# Patient Record
Sex: MALE | Race: BLACK OR AFRICAN AMERICAN | NOT HISPANIC OR LATINO | URBAN - METROPOLITAN AREA
[De-identification: names, ages, dates, MRNs, and addresses within clinical notes are randomized per-mention and may not be internally consistent; named-entity substitution may affect disease eponyms.]

---

## 2017-11-27 ENCOUNTER — INPATIENT (INPATIENT)
Facility: HOSPITAL | Age: 67
LOS: 14 days | Discharge: HOME | DRG: 264 | End: 2017-12-12
Attending: HOSPITALIST | Admitting: HOSPITALIST
Payer: COMMERCIAL

## 2017-11-27 VITALS
HEART RATE: 72 BPM | WEIGHT: 229.94 LBS | HEIGHT: 73 IN | DIASTOLIC BLOOD PRESSURE: 80 MMHG | SYSTOLIC BLOOD PRESSURE: 109 MMHG | OXYGEN SATURATION: 96 % | TEMPERATURE: 98 F | RESPIRATION RATE: 20 BRPM

## 2017-11-27 DIAGNOSIS — R55 SYNCOPE AND COLLAPSE: ICD-10-CM

## 2017-11-27 DIAGNOSIS — R73.02 IMPAIRED GLUCOSE TOLERANCE (ORAL): ICD-10-CM

## 2017-11-27 DIAGNOSIS — D64.9 ANEMIA, UNSPECIFIED: ICD-10-CM

## 2017-11-27 DIAGNOSIS — N18.2 CHRONIC KIDNEY DISEASE, STAGE 2 (MILD): ICD-10-CM

## 2017-11-27 DIAGNOSIS — L97.313 NON-PRESSURE CHRONIC ULCER OF RIGHT ANKLE WITH NECROSIS OF MUSCLE: ICD-10-CM

## 2017-11-27 DIAGNOSIS — I10 ESSENTIAL (PRIMARY) HYPERTENSION: ICD-10-CM

## 2017-11-27 DIAGNOSIS — I83.012 VARICOSE VEINS OF RIGHT LOWER EXTREMITY WITH ULCER OF CALF: ICD-10-CM

## 2017-11-27 LAB
ALBUMIN SERPL ELPH-MCNC: 2.8 G/DL — LOW (ref 3.3–5.2)
ALP SERPL-CCNC: 66 U/L — SIGNIFICANT CHANGE UP (ref 40–120)
ALT FLD-CCNC: 7 U/L — SIGNIFICANT CHANGE UP
ANION GAP SERPL CALC-SCNC: 13 MMOL/L — SIGNIFICANT CHANGE UP (ref 5–17)
AST SERPL-CCNC: 11 U/L — SIGNIFICANT CHANGE UP
BASOPHILS # BLD AUTO: 0 K/UL — SIGNIFICANT CHANGE UP (ref 0–0.2)
BASOPHILS NFR BLD AUTO: 0.2 % — SIGNIFICANT CHANGE UP (ref 0–2)
BILIRUB SERPL-MCNC: 0.3 MG/DL — LOW (ref 0.4–2)
BUN SERPL-MCNC: 15 MG/DL — SIGNIFICANT CHANGE UP (ref 8–20)
CALCIUM SERPL-MCNC: 8.7 MG/DL — SIGNIFICANT CHANGE UP (ref 8.6–10.2)
CHLORIDE SERPL-SCNC: 102 MMOL/L — SIGNIFICANT CHANGE UP (ref 98–107)
CK SERPL-CCNC: 97 U/L — SIGNIFICANT CHANGE UP (ref 30–200)
CO2 SERPL-SCNC: 21 MMOL/L — LOW (ref 22–29)
CREAT SERPL-MCNC: 1.37 MG/DL — HIGH (ref 0.5–1.3)
EOSINOPHIL # BLD AUTO: 0.4 K/UL — SIGNIFICANT CHANGE UP (ref 0–0.5)
EOSINOPHIL NFR BLD AUTO: 4.8 % — SIGNIFICANT CHANGE UP (ref 0–5)
GLUCOSE BLDC GLUCOMTR-MCNC: 133 MG/DL — HIGH (ref 70–99)
GLUCOSE BLDC GLUCOMTR-MCNC: 144 MG/DL — HIGH (ref 70–99)
GLUCOSE BLDC GLUCOMTR-MCNC: 167 MG/DL — HIGH (ref 70–99)
GLUCOSE SERPL-MCNC: 198 MG/DL — HIGH (ref 70–115)
HCT VFR BLD CALC: 32.3 % — LOW (ref 42–52)
HGB BLD-MCNC: 10.6 G/DL — LOW (ref 14–18)
INR BLD: 1.14 RATIO — SIGNIFICANT CHANGE UP (ref 0.88–1.16)
LACTATE BLDV-MCNC: 2.4 MMOL/L — HIGH (ref 0.5–2)
LYMPHOCYTES # BLD AUTO: 1.7 K/UL — SIGNIFICANT CHANGE UP (ref 1–4.8)
LYMPHOCYTES # BLD AUTO: 19.9 % — LOW (ref 20–55)
MCHC RBC-ENTMCNC: 27 PG — SIGNIFICANT CHANGE UP (ref 27–31)
MCHC RBC-ENTMCNC: 32.8 G/DL — SIGNIFICANT CHANGE UP (ref 32–36)
MCV RBC AUTO: 82.4 FL — SIGNIFICANT CHANGE UP (ref 80–94)
MONOCYTES # BLD AUTO: 0.8 K/UL — SIGNIFICANT CHANGE UP (ref 0–0.8)
MONOCYTES NFR BLD AUTO: 9.8 % — SIGNIFICANT CHANGE UP (ref 3–10)
NEUTROPHILS # BLD AUTO: 5.5 K/UL — SIGNIFICANT CHANGE UP (ref 1.8–8)
NEUTROPHILS NFR BLD AUTO: 64.7 % — SIGNIFICANT CHANGE UP (ref 37–73)
NT-PROBNP SERPL-SCNC: 164 PG/ML — SIGNIFICANT CHANGE UP (ref 0–300)
PLATELET # BLD AUTO: 326 K/UL — SIGNIFICANT CHANGE UP (ref 150–400)
POTASSIUM SERPL-MCNC: 3.9 MMOL/L — SIGNIFICANT CHANGE UP (ref 3.5–5.3)
POTASSIUM SERPL-SCNC: 3.9 MMOL/L — SIGNIFICANT CHANGE UP (ref 3.5–5.3)
PROT SERPL-MCNC: 7.4 G/DL — SIGNIFICANT CHANGE UP (ref 6.6–8.7)
PROTHROM AB SERPL-ACNC: 12.6 SEC — SIGNIFICANT CHANGE UP (ref 9.8–12.7)
RBC # BLD: 3.92 M/UL — LOW (ref 4.6–6.2)
RBC # FLD: 13.3 % — SIGNIFICANT CHANGE UP (ref 11–15.6)
SODIUM SERPL-SCNC: 136 MMOL/L — SIGNIFICANT CHANGE UP (ref 135–145)
TROPONIN T SERPL-MCNC: <0.01 NG/ML — SIGNIFICANT CHANGE UP (ref 0–0.06)
WBC # BLD: 8.6 K/UL — SIGNIFICANT CHANGE UP (ref 4.8–10.8)
WBC # FLD AUTO: 8.6 K/UL — SIGNIFICANT CHANGE UP (ref 4.8–10.8)

## 2017-11-27 PROCEDURE — 99285 EMERGENCY DEPT VISIT HI MDM: CPT | Mod: 25

## 2017-11-27 PROCEDURE — 12345: CPT | Mod: NC

## 2017-11-27 PROCEDURE — 70450 CT HEAD/BRAIN W/O DYE: CPT | Mod: 26

## 2017-11-27 PROCEDURE — 73590 X-RAY EXAM OF LOWER LEG: CPT | Mod: 26,RT

## 2017-11-27 PROCEDURE — 93010 ELECTROCARDIOGRAM REPORT: CPT

## 2017-11-27 PROCEDURE — 93880 EXTRACRANIAL BILAT STUDY: CPT | Mod: 26

## 2017-11-27 RX ORDER — SODIUM CHLORIDE 9 MG/ML
1000 INJECTION, SOLUTION INTRAVENOUS
Qty: 0 | Refills: 0 | Status: DISCONTINUED | OUTPATIENT
Start: 2017-11-27 | End: 2017-12-04

## 2017-11-27 RX ORDER — TAMSULOSIN HYDROCHLORIDE 0.4 MG/1
0.4 CAPSULE ORAL AT BEDTIME
Qty: 0 | Refills: 0 | Status: DISCONTINUED | OUTPATIENT
Start: 2017-11-27 | End: 2017-12-04

## 2017-11-27 RX ORDER — SODIUM CHLORIDE 9 MG/ML
2000 INJECTION INTRAMUSCULAR; INTRAVENOUS; SUBCUTANEOUS ONCE
Qty: 0 | Refills: 0 | Status: DISCONTINUED | OUTPATIENT
Start: 2017-11-27 | End: 2017-11-27

## 2017-11-27 RX ORDER — GLUCAGON INJECTION, SOLUTION 0.5 MG/.1ML
1 INJECTION, SOLUTION SUBCUTANEOUS ONCE
Qty: 0 | Refills: 0 | Status: DISCONTINUED | OUTPATIENT
Start: 2017-11-27 | End: 2017-12-04

## 2017-11-27 RX ORDER — DEXTROSE 50 % IN WATER 50 %
1 SYRINGE (ML) INTRAVENOUS ONCE
Qty: 0 | Refills: 0 | Status: DISCONTINUED | OUTPATIENT
Start: 2017-11-27 | End: 2017-12-04

## 2017-11-27 RX ORDER — DEXTROSE 50 % IN WATER 50 %
25 SYRINGE (ML) INTRAVENOUS ONCE
Qty: 0 | Refills: 0 | Status: DISCONTINUED | OUTPATIENT
Start: 2017-11-27 | End: 2017-12-04

## 2017-11-27 RX ORDER — ONDANSETRON 8 MG/1
4 TABLET, FILM COATED ORAL EVERY 6 HOURS
Qty: 0 | Refills: 0 | Status: DISCONTINUED | OUTPATIENT
Start: 2017-11-27 | End: 2017-12-04

## 2017-11-27 RX ORDER — DEXTROSE 50 % IN WATER 50 %
12.5 SYRINGE (ML) INTRAVENOUS ONCE
Qty: 0 | Refills: 0 | Status: DISCONTINUED | OUTPATIENT
Start: 2017-11-27 | End: 2017-12-04

## 2017-11-27 RX ORDER — INSULIN LISPRO 100/ML
VIAL (ML) SUBCUTANEOUS
Qty: 0 | Refills: 0 | Status: DISCONTINUED | OUTPATIENT
Start: 2017-11-27 | End: 2017-12-04

## 2017-11-27 RX ORDER — SODIUM HYPOCHLORITE 0.125 %
1 SOLUTION, NON-ORAL MISCELLANEOUS DAILY
Qty: 0 | Refills: 0 | Status: DISCONTINUED | OUTPATIENT
Start: 2017-11-27 | End: 2017-12-04

## 2017-11-27 RX ORDER — SODIUM CHLORIDE 9 MG/ML
3 INJECTION INTRAMUSCULAR; INTRAVENOUS; SUBCUTANEOUS EVERY 8 HOURS
Qty: 0 | Refills: 0 | Status: DISCONTINUED | OUTPATIENT
Start: 2017-11-27 | End: 2017-12-04

## 2017-11-27 RX ORDER — SODIUM CHLORIDE 9 MG/ML
3 INJECTION INTRAMUSCULAR; INTRAVENOUS; SUBCUTANEOUS ONCE
Qty: 0 | Refills: 0 | Status: COMPLETED | OUTPATIENT
Start: 2017-11-27 | End: 2017-11-27

## 2017-11-27 RX ADMIN — SODIUM CHLORIDE 3 MILLILITER(S): 9 INJECTION INTRAMUSCULAR; INTRAVENOUS; SUBCUTANEOUS at 03:41

## 2017-11-27 RX ADMIN — SODIUM CHLORIDE 3 MILLILITER(S): 9 INJECTION INTRAMUSCULAR; INTRAVENOUS; SUBCUTANEOUS at 06:32

## 2017-11-27 RX ADMIN — Medication 1 APPLICATION(S): at 14:14

## 2017-11-27 RX ADMIN — SODIUM CHLORIDE 3 MILLILITER(S): 9 INJECTION INTRAMUSCULAR; INTRAVENOUS; SUBCUTANEOUS at 22:09

## 2017-11-27 RX ADMIN — TAMSULOSIN HYDROCHLORIDE 0.4 MILLIGRAM(S): 0.4 CAPSULE ORAL at 22:22

## 2017-11-27 RX ADMIN — SODIUM CHLORIDE 3 MILLILITER(S): 9 INJECTION INTRAMUSCULAR; INTRAVENOUS; SUBCUTANEOUS at 12:06

## 2017-11-27 NOTE — PROGRESS NOTE ADULT - SUBJECTIVE AND OBJECTIVE BOX
ALIZA DIALLO Patient is a 67y old  Male who presents with a chief complaint of Right leg bleeding (27 Nov 2017 06:07)     HPI:  66y/o male with hx. of chronic RLE venous stasis ulcer for past 3 years who was being followed by private Vascular surgeon until earlier this year presents to ED by EMS after wound started bleeding spontaneously. Patient states he is no longer following with Dr. Aguirre, because they dont take his insurance. He has been using medihoney and dry dressing at home. Denies any recent wound drainage or foul smell. No fever, chills, N/V, SOB, CP. He states he has had vascular studies in past showing sufficient blood flow to lower leg. Patient was taking a shower after he saw the bleeding and then had a syncopal episode witnessed by his wife. She denies any seizure like activity. No hx of CAD/CVA. Patient in ED with non-diagnostic CVA w/u. Labs at baseline. No focal weakness. EKG with no acute changes. RLE wound with no evidence of active infection. He will be admitted for Vascular surgery/wound care eval and syncope w/u. (27 Nov 2017 06:07)    HEALTH ISSUES - PROBLEM Dx:  CKD (chronic kidney disease) stage 2, GFR 60-89 ml/min: CKD (chronic kidney disease) stage 2, GFR 60-89 ml/min  Chronic anemia: Chronic anemia  Glucose intolerance (impaired glucose tolerance): Glucose intolerance (impaired glucose tolerance)  Essential hypertension: Essential hypertension  Syncope and collapse: Syncope and collapse  Venous stasis ulcer of right calf with other ulcer severity, unspecified whether varicose veins present: Venous stasis ulcer of right calf with other ulcer severity, unspecified whether varicose veins present        PAST MEDICAL & SURGICAL HISTORY:  Chronic anemia  CKD (chronic kidney disease) stage 2, GFR 60-89 ml/min  Glucose intolerance (impaired glucose tolerance)  MRSA (methicillin resistant Staphylococcus aureus) infection: inner thigh 3/2016  Venous stasis ulcer  Hypertension  No significant past surgical history          Vital Signs Last 24 Hrs  T(C): 36.6 (27 Nov 2017 12:11), Max: 36.7 (27 Nov 2017 06:07)  T(F): 97.8 (27 Nov 2017 12:11), Max: 98 (27 Nov 2017 06:07)  HR: 63 (27 Nov 2017 12:11) (57 - 78)  BP: 129/68 (27 Nov 2017 12:11) (109/80 - 131/84)  BP(mean): 89 (27 Nov 2017 06:07) (89 - 89)  RR: 16 (27 Nov 2017 12:11) (16 - 20)  SpO2: 99% (27 Nov 2017 12:11) (96% - 99%)T(C): 36.6 (11-27-17 @ 12:11), Max: 36.7 (11-27-17 @ 06:07)  HR: 63 (11-27-17 @ 12:11) (57 - 78)  BP: 129/68 (11-27-17 @ 12:11) (109/80 - 131/84)  RR: 16 (11-27-17 @ 12:11) (16 - 20)  SpO2: 99% (11-27-17 @ 12:11) (96% - 99%)  Wt(kg): --    PHYSICAL EXAM:    GENERAL: NAD, well-groomed, well-developed  HEAD:  Atraumatic, Normocephalic  EYES: EOMI, PERRLA, conjunctiva and sclera clear  ENMT:  Moist mucous membranes,  No lesions  NECK: Supple, No JVD, Normal thyroid  NERVOUS SYSTEM:  Alert & Oriented X3,  Moves upper and lower extremities; DTRs 2+ intact and symmetric  CHEST/LUNG: Clear to auscultation bilaterally; No rales, rhonchi, wheezing,   HEART: Regular rate and rhythm; No murmurs,   ABDOMEN: Soft, Nontender, Nondistended; Bowel sounds present  EXTREMITIES:  Peripheral Pulses 2+ No  cyanosis, or edema of left lower ext. Right lower ext has chronic ulcer with greenish material mid-portion of leg wound. No active bleeding from wound. Pt denies pain in and around area.  SKIN: No rashes or lesions.  Neuro: grossly intact

## 2017-11-27 NOTE — PROGRESS NOTE ADULT - SUBJECTIVE AND OBJECTIVE BOX
chief complaint of Right leg bleeding (27 Nov 2017 06:07)    HPI:  68y/o male with hx. of chronic RLE venous stasis ulcer for past 3 years who was being followed by private Vascular surgeon until earlier this year presents to ED by EMS after wound started bleeding spontaneously. Patient states he is no longer following with Dr. Aguirre, because they dont take his insurance. He has been using medihoney and dry dressing at home. Denies any recent wound drainage or foul smell. No fever, chills, N/V, SOB, CP. He states he has had vascular studies in past showing sufficient blood flow to lower leg. Patient was taking a shower after he saw the bleeding and then had a syncopal episode witnessed by his wife. She denies any seizure like activity. No hx of CAD/CVA.     Today:  Pt notes bleeding has stopped to his RLE ulcer.     ROS:  no dizziness  no chest pain  no sob  no loc    PAST MEDICAL & SURGICAL HISTORY:  Chronic anemia  CKD (chronic kidney disease) stage 2, GFR 60-89 ml/min  Glucose intolerance (impaired glucose tolerance)  MRSA (methicillin resistant Staphylococcus aureus) infection: inner thigh 3/2016  Venous stasis ulcer  Hypertension  No significant past surgical history    MEDICATIONS  (STANDING):  Dakins Solution - 1/2 Strength 1 Application(s) Topical daily  dextrose 5%. 1000 milliLiter(s) (50 mL/Hr) IV Continuous <Continuous>  dextrose 50% Injectable 12.5 Gram(s) IV Push once  dextrose 50% Injectable 25 Gram(s) IV Push once  dextrose 50% Injectable 25 Gram(s) IV Push once  insulin lispro (HumaLOG) corrective regimen sliding scale   SubCutaneous Before meals and at bedtime  silver sulfADIAZINE 1% Cream 1 Application(s) Topical daily  sodium chloride 0.9% lock flush 3 milliLiter(s) IV Push every 8 hours  tamsulosin 0.4 milliGRAM(s) Oral at bedtime    EXAM:  Vital Signs Last 24 Hrs  T(C): 36.6 (27 Nov 2017 12:11), Max: 36.7 (27 Nov 2017 06:07)  T(F): 97.8 (27 Nov 2017 12:11), Max: 98 (27 Nov 2017 06:07)  HR: 63 (27 Nov 2017 12:11) (57 - 78)  BP: 129/68 (27 Nov 2017 12:11) (109/80 - 131/84)  BP(mean): 89 (27 Nov 2017 06:07) (89 - 89)  RR: 16 (27 Nov 2017 12:11) (16 - 20)  SpO2: 99% (27 Nov 2017 12:11) (96% - 99%)    GENERAL NAD, SITTING UPRIGHT IN BED	  HEENT: NORMAL CEPHALIC ATRAUMATIC  NECK SUPPLE  CVS S1S2, RRR,   RESP BLCTA, NO RHONCHI  ABD SOFT, NON TENDER, NON DISTENDED  EXT NO EDEMA. +RLE ULCER AT MEDIAL TIBIAL REGION WITH YELLOW/GREEN DISCHARGE. NO ACTIVE BLEEDING.   NEURO MOVES ALL 4 EXTREMITES  PSYCH APPROPRIATE MOOD  SKIN WARM, DRY    LABS:  PT/INR - ( 27 Nov 2017 03:56 )   PT: 12.6 sec;   INR: 1.14 ratio    LIVER FUNCTIONS - ( 27 Nov 2017 03:56 )  Alb: 2.8 g/dL / Pro: 7.4 g/dL / ALK PHOS: 66 U/L / ALT: 7 U/L / AST: 11 U/L / GGT: x                               10.6   8.6   )-----------( 326      ( 27 Nov 2017 03:56 )             32.3   11-27  136  |  102  |  15.0  ----------------------------<  198<H>  3.9   |  21.0<L>  |  1.37<H>  Ca    8.7      27 Nov 2017 03:56  TPro  7.4  /  Alb  2.8<L>  /  TBili  0.3<L>  /  DBili  x   /  AST  11  /  ALT  7   /  AlkPhos  66  11-27    CARDIAC MARKERS ( 27 Nov 2017 03:56 )  x     / <0.01 ng/mL / 97 U/L / x     / x

## 2017-11-27 NOTE — H&P ADULT - HISTORY OF PRESENT ILLNESS
68y/o male with hx. of chronic RLE venous stasis ulcer for past 3 years who was being followed by private Vascular surgeon until earlier this year presents to ED by EMS after wound started bleeding spontaneously. Patient states he is no longer following with Dr. Aguirre, because they dont take his insurance. He has been using medihoney and dry dressing at home. Denies any recent wound drainage or foul smell. No fever, chills, N/V, SOB, CP. He states he has had vascular studies in past showing sufficient blood flow to lower leg. Patient was taking a shower after he saw the bleeding and then had a syncopal episode witnessed by his wife. She denies any seizure like activity. No hx of CAD/CVA. Patient in ED with non-diagnostic CVA w/u. Labs at baseline. No focal weakness. EKG with no acute changes. RLE wound with no evidence of active infection. He will be admitted for Vascular surgery/wound care eval and syncope w/u.

## 2017-11-27 NOTE — H&P ADULT - PROBLEM SELECTOR PLAN 2
Likely vasovagal as it happened when patient came out of shower, Check orthostatics, echo, carotids, monitor for tachy/viktoriya arrhythmias

## 2017-11-27 NOTE — H&P ADULT - PROBLEM SELECTOR PLAN 1
No evidence of infection at this time, no fever, no leukocytosis, no shift, no clinical cellulitis. Vascular/Wound care consult. Silvadene cream, wet dressing for now. Avoid chemical DVT-P with recent bleeding. Fall risk protocol

## 2017-11-27 NOTE — H&P ADULT - PMH
Chronic anemia    CKD (chronic kidney disease) stage 2, GFR 60-89 ml/min    Glucose intolerance (impaired glucose tolerance)    Hypertension    MRSA (methicillin resistant Staphylococcus aureus) infection  inner thigh 3/2016  Venous stasis ulcer

## 2017-11-27 NOTE — H&P ADULT - PROBLEM SELECTOR PROBLEM 1
Venous stasis ulcer of right calf with other ulcer severity, unspecified whether varicose veins present

## 2017-11-27 NOTE — ED ADULT NURSE NOTE - PMH
Hypertension    MRSA (methicillin resistant Staphylococcus aureus) infection  inner thigh 3/2016  Venous stasis ulcer

## 2017-11-27 NOTE — ED ADULT NURSE REASSESSMENT NOTE - TEMPLATE LIST FOR HEAD TO TOE ASSESSMENT
Wound Check/Suture Removal

## 2017-11-27 NOTE — H&P ADULT - EXTREMITIES COMMENTS
RLE large circumferential deep venous stasis ulcer with scattered areas of eschar, no drainage, no foul smell, no surrounding cellulitis, DP +1 PT + 1

## 2017-11-27 NOTE — ED PROVIDER NOTE - OBJECTIVE STATEMENT
66 y/o M pt with a PMHx of HTN presents to the ED c/o leg bleeding and syncope that onset tonight. As per the pt, he was in bed he noticed that his pants, sheets and legs were bleeding. Showed his wife who activated EMS and told him to get in the shower to clean the leg. While he was cleaning himself in the shower he had a syncopal episode. Wife reports that he was unconscious for about 3 minutes. Pt states that leg wound has been there for a couple of years. He states that it is healing but he has not seen his "doctor" for a long time. Cannot recall who his surgeon or PMD but remember that the name starts with a "C". Wife reports that his PMD Dr. Bonner and his surgeon is Dr. Aguirre. Denies fever, chills, n/v/d/c, dizziness, chest pain, sob, head trauma,

## 2017-11-27 NOTE — PROGRESS NOTE ADULT - ASSESSMENT
67 year old male with the appearance of venous stasis ulcer of medial aspect right lower extremity.  The wound was undressed for inspection by MD and redressed by myself.

## 2017-11-27 NOTE — ED ADULT NURSE REASSESSMENT NOTE - NS ED NURSE REASSESS COMMENT FT1
Assume pt care from STEFANI Zayas @ 1945. Pt sitting in bed in NAD. Pt is NSR on cardiac monitor. Wound on RLE is wrapped in an ace bandage. Pt has no complaint at this time and is aware of the plan of care. Pt awaiting bed placement. Will continue to monitor. STEFANI long.
Pt sleeping comfortably in NAD Resp even and unlabored. NSR on cardiac monitor. Pt awaiting bed placement to 4 [place] will be calling up for report. Will continue to monitor.
Pt. to be admitted by MD Pacheco. as per MD pt. does not require IV abx or blood cultures.
vascular PA at bedside dressing wound. to place orders for wet to dry normal saline to venous stasis ulcer.
patient aox4 comfortable in appearance. respirations clear equal and unlabored. heart sounds s1 s2  WDL, abdomen soft nontender + bowel sounds all 4 quadrants, moves all extremities. + pulse all 4 extremities. + sensation all 4 extremities. patient and family updated on plan of care. patient and family educated on hourly rounding procedures and understands call bell system.   RLE venous stasis ulcer large wound bed light pink moist with serosanguenous drainage, scant bloody drainage. malodorous, denies pain at site. pt refused blood cultures, refused iv abx.

## 2017-11-27 NOTE — ED PROVIDER NOTE - CARE PLAN
Principal Discharge DX:	Syncope and collapse  Secondary Diagnosis:	Ulcer of right ankle, with necrosis of muscle

## 2017-11-27 NOTE — ED ADULT NURSE NOTE - OBJECTIVE STATEMENT
Pt. presents to ED with c/o wound check. Pt. has a chronic ulcer to right lower leg from medial aspect of leg to anterior aspect appx 15cm by 15cm, stage three, no bleeding at this time. wound bed is pink with areas of eschar noted. Pt. states he has had this wound "for years", woke up to take off his jeans when he noticed blood on his pants.

## 2017-11-27 NOTE — ED ADULT TRIAGE NOTE - CHIEF COMPLAINT QUOTE
pt BIBA with wound on right lower leg, bleeding controlled at this time as per ems pt lost about 1/2 pint of blood. pt is complaining of headache at this time.

## 2017-11-27 NOTE — CONSULT NOTE ADULT - SUBJECTIVE AND OBJECTIVE BOX
Vascular Attending:        HPI:  68y/o male with hx. of chronic RLE venous stasis ulcer for past 3 years who was being followed by private Vascular surgeon until earlier this year presents to ED by EMS after wound started bleeding spontaneously. Patient states he is no longer following with Dr. Aguirre, because they dont take his insurance. He has been using medihoney and dry dressing at home. Denies any recent wound drainage or foul smell. No fever, chills, N/V, SOB, CP. He states he has had vascular studies in past showing sufficient blood flow to lower leg. Patient was taking a shower after he saw the bleeding and then had a syncopal episode witnessed by his wife. She denies any seizure like activity. No hx of CAD/CVA. Patient in ED with non-diagnostic CVA w/u. Labs at baseline. No focal weakness. EKG with no acute changes. RLE wound with no evidence of active infection. He will be admitted for Vascular surgery/wound care eval and syncope w/u. (27 Nov 2017 06:07)    Vascular Surgery HPI:    67 year old patient who presents to hospital with a long standing history of rle ulcer.  Patient currently being admitted for syncopal work up. Patient reports ulcer affecting his right lower ext for over 10 year period.  Ulcer appeared after wearing tight socks.  Patient was being managed by Dr. Aguirre.  Patient last saw Dr Aguirre ~april 2017.  Reports that Dr aguirre was planning a procedure to remove non functional veins from his Right LE. Procedure was canceled due to insurance issues.  Patient reports self administering wound care since his last office visit.   Reports past debridements to Right lower extremity, cannot recall the dates.  Reports no recent fever or chills.        PAST MEDICAL & SURGICAL HISTORY:  Chronic anemia  CKD (chronic kidney disease) stage 2, GFR 60-89 ml/min  Glucose intolerance (impaired glucose tolerance)  MRSA (methicillin resistant Staphylococcus aureus) infection: inner thigh 3/2016  Venous stasis ulcer  Hypertension  No significant past surgical history      REVIEW OF SYSTEMS    ROS reviewed, negative except per listed in the HPI      General:	    Skin/Breast:  	  Ophthalmologic:  	  ENMT:	    Respiratory and Thorax:  	  Cardiovascular:	    Gastrointestinal:	    Genitourinary:	    Musculoskeletal:	    Neurological:	    Psychiatric:	    Hematology/Lymphatics:	    Endocrine:	    Allergic/Immunologic:	    MEDICATIONS  (STANDING):  dextrose 5%. 1000 milliLiter(s) (50 mL/Hr) IV Continuous <Continuous>  dextrose 50% Injectable 12.5 Gram(s) IV Push once  dextrose 50% Injectable 25 Gram(s) IV Push once  dextrose 50% Injectable 25 Gram(s) IV Push once  insulin lispro (HumaLOG) corrective regimen sliding scale   SubCutaneous Before meals and at bedtime  silver sulfADIAZINE 1% Cream 1 Application(s) Topical daily  sodium chloride 0.9% lock flush 3 milliLiter(s) IV Push every 8 hours  tamsulosin 0.4 milliGRAM(s) Oral at bedtime    MEDICATIONS  (PRN):  dextrose Gel 1 Dose(s) Oral once PRN Blood Glucose LESS THAN 70 milliGRAM(s)/deciliter  glucagon  Injectable 1 milliGRAM(s) IntraMuscular once PRN Glucose LESS THAN 70 milligrams/deciliter  ondansetron Injectable 4 milliGRAM(s) IV Push every 6 hours PRN Nausea      Allergies    No Known Allergies    Intolerances        SOCIAL HISTORY:    denies illicit rX use, no reported       Vital Signs Last 24 Hrs  T(C): 35.7 (27 Nov 2017 07:30), Max: 36.7 (27 Nov 2017 06:07)  T(F): 96.2 (27 Nov 2017 07:30), Max: 98 (27 Nov 2017 06:07)  HR: 57 (27 Nov 2017 07:30) (57 - 78)  BP: 131/84 (27 Nov 2017 07:30) (109/80 - 131/84)  BP(mean): 89 (27 Nov 2017 06:07) (89 - 89)  RR: 16 (27 Nov 2017 07:30) (16 - 20)  SpO2: 98% (27 Nov 2017 07:30) (96% - 98%)    PHYSICAL EXAM:      Constitutional:  No distress, supine, comfortable    Eyes:  EOROM intact    ENMT: Moist membranes    Neck:  Supple no JVD, No bruits      Respiratory:  Clear b/l     Cardiovascular: s1/s2    Gastrointestinal:  Soft , NT.  Distended, No pulsatile mass    Rectal: deferred    Extremities: warm BLE, gross rom intact, no pitting edema,  Large, irregular contoured ulceration to medial aspect of RLE.  Rancid odor, fibrinous exudates and sloughing.  Serous drainage. Fluorescent tinging of previous gauze dressing     Vascular: 2+ femorals/popliteals/dorsalis bilateral LE.  2+ radials     Neurological:  Sensation to light touch to distal aspects of extremities grossly intact     Skin:  Hyper pigmented RLE      Musculoskeletal: Rom grossly intact, HIPs/Knees    Psychiatric:  Good afffect             LABS:                        10.6   8.6   )-----------( 326      ( 27 Nov 2017 03:56 )             32.3     11-27    136  |  102  |  15.0  ----------------------------<  198<H>  3.9   |  21.0<L>  |  1.37<H>    Ca    8.7      27 Nov 2017 03:56    TPro  7.4  /  Alb  2.8<L>  /  TBili  0.3<L>  /  DBili  x   /  AST  11  /  ALT  7   /  AlkPhos  66  11-27    PT/INR - ( 27 Nov 2017 03:56 )   PT: 12.6 sec;   INR: 1.14 ratio               RADIOLOGY & ADDITIONAL STUDIES        Impression and Plan:    Chronic venous stasis ulceration to the RLE, requiring aggressive wound care/management    -  Obtain Tibial x-Ray to eval for possible underlying osteomylelitis  - Daily wound care with Dakins wet to dry, and compression ace wrap  - Elevate affected lower extremity.  - Recommend ID consultation to evaluate need  for possible pseudomonal coverage Vascular Attending:  SIDNHU VALENZUELA [facility] HPI:  68y/o male with hx. of chronic RLE venous stasis ulcer for past 3 years who was being followed by private Vascular surgeon until earlier this approximately April of this year presents to ED by EMS after wound started bleeding spontaneously. Patient states he is no longer following with Dr. Aguirre, because they dont take his insurance. He has been using medihoney and dry dressing at home. Denies any recent wound drainage or foul smell. No fever, chills, N/V, SOB, CP. He states he has had vascular studies in past showing sufficient blood flow to lower leg. Patient was taking a shower after he saw the bleeding and then had a syncopal episode witnessed by his wife. She denies any seizure like activity. No hx of CAD/CVA. Patient in ED with non-diagnostic CVA w/u. Labs at baseline. No focal weakness. EKG with no acute changes. RLE wound with no evidence of active infection. He will be admitted for Vascular surgery/wound care eval and syncope w/u. (27 Nov 2017 06:07)    Vascular Surgery HPI:    67 year old patient who presents to hospital with a long standing history of rle ulcer.  Patient currently being admitted for syncopal work up. Patient reports ulcer affecting his right lower ext for over 10 year period.  Ulcer appeared after wearing tight socks.  Patient was being managed by Dr. Aguirre.  Patient last saw Dr Aguirre ~April 2017.  Reports that Dr Aguirre was planning a procedure to remove non functional veins from his Right LE. Procedure was canceled due to insurance issues.  Patient reports self administering wound care since his last office visit.   Reports past debridements to Right lower extremity, cannot recall the dates.  Reports no recent fever or chills.        PAST MEDICAL & SURGICAL HISTORY:  Chronic anemia  CKD (chronic kidney disease) stage 2, GFR 60-89 ml/min  Glucose intolerance (impaired glucose tolerance)  MRSA (methicillin resistant Staphylococcus aureus) infection: inner thigh 3/2016  Venous stasis ulcer  Hypertension  No significant past surgical history      REVIEW OF SYSTEMS    ROS reviewed, negative except per listed in the HPI      General:	    Skin/Breast:  	  Ophthalmologic:  	  ENMT:	    Respiratory and Thorax:  	  Cardiovascular:	    Gastrointestinal:	    Genitourinary:	    Musculoskeletal:	    Neurological:	    Psychiatric:	    Hematology/Lymphatics:	    Endocrine:	    Allergic/Immunologic:	    MEDICATIONS  (STANDING):  dextrose 5%. 1000 milliLiter(s) (50 mL/Hr) IV Continuous <Continuous>  dextrose 50% Injectable 12.5 Gram(s) IV Push once  dextrose 50% Injectable 25 Gram(s) IV Push once  dextrose 50% Injectable 25 Gram(s) IV Push once  insulin lispro (HumaLOG) corrective regimen sliding scale   SubCutaneous Before meals and at bedtime  silver sulfADIAZINE 1% Cream 1 Application(s) Topical daily  sodium chloride 0.9% lock flush 3 milliLiter(s) IV Push every 8 hours  tamsulosin 0.4 milliGRAM(s) Oral at bedtime    MEDICATIONS  (PRN):  dextrose Gel 1 Dose(s) Oral once PRN Blood Glucose LESS THAN 70 milliGRAM(s)/deciliter  glucagon  Injectable 1 milliGRAM(s) IntraMuscular once PRN Glucose LESS THAN 70 milligrams/deciliter  ondansetron Injectable 4 milliGRAM(s) IV Push every 6 hours PRN Nausea      Allergies    No Known Allergies    Intolerances        SOCIAL HISTORY:    denies illicit rX use, no reported       Vital Signs Last 24 Hrs  T(C): 35.7 (27 Nov 2017 07:30), Max: 36.7 (27 Nov 2017 06:07)  T(F): 96.2 (27 Nov 2017 07:30), Max: 98 (27 Nov 2017 06:07)  HR: 57 (27 Nov 2017 07:30) (57 - 78)  BP: 131/84 (27 Nov 2017 07:30) (109/80 - 131/84)  BP(mean): 89 (27 Nov 2017 06:07) (89 - 89)  RR: 16 (27 Nov 2017 07:30) (16 - 20)  SpO2: 98% (27 Nov 2017 07:30) (96% - 98%)    PHYSICAL EXAM:      Constitutional:  No distress, supine, comfortable    Eyes:  EOROM intact    ENMT: Moist membranes    Neck:  Supple no JVD, No bruits      Respiratory:  Clear b/l     Cardiovascular: s1/s2    Gastrointestinal:  Soft , NT.  Distended, No pulsatile mass    Rectal: deferred    Extremities: warm BLE, gross ROM lower extremities including toes intact, + pitting edema right lower extremity, none on left.  Large, irregular, very deep contoured ulceration to medial aspect of RLE.  Rancid odor, fibrinous exudates and sloughing.  Serous drainage. Fluorescent tinging of previous gauze dressing. Central area of ulcer appears to have exposed bone    Vascular: 2+ femorals/popliteals/dorsalis bilateral LE.  2+ radials     Neurological:  Sensation to light touch to distal aspects of extremities grossly intact     Skin:  Hyper pigmented RLE      Musculoskeletal: ROM grossly intact, HIPs/Knees    Psychiatric:  Good afffect             LABS:                        10.6   8.6   )-----------( 326      ( 27 Nov 2017 03:56 )             32.3     11-27    136  |  102  |  15.0  ----------------------------<  198<H>  3.9   |  21.0<L>  |  1.37<H>    Ca    8.7      27 Nov 2017 03:56    TPro  7.4  /  Alb  2.8<L>  /  TBili  0.3<L>  /  DBili  x   /  AST  11  /  ALT  7   /  AlkPhos  66  11-27    PT/INR - ( 27 Nov 2017 03:56 )   PT: 12.6 sec;   INR: 1.14 ratio               RADIOLOGY & ADDITIONAL STUDIES        Impression and Plan:    Chronic venous stasis ulceration to the RLE, requiring aggressive wound care/management    -  Obtain Tibial x-Ray to eval for possible underlying osteomylelitis  - TWICE DAILY wound care with Dakins wet to dry, and compression ace wrap  - Elevate affected lower extremity.  - Recommend:  1.  ID consultation to evaluate need  for possible pseudomonal/GNR coverage as well as osteo Rx;  2. Plastic surgery consult - size and depth of wound will require coverage once wound is clean

## 2017-11-27 NOTE — PROGRESS NOTE ADULT - ASSESSMENT
66 y/o male with resolved episode of bleeding from RLE venous stasis ulcer, Syncope, hx of HTN, CKD-2, Chronic Anemia, Glucose Intolerance    - Venous stasis ulcer of right calf with other ulcer severity, unspecified whether varicose veins present.   Vascular/Wound care consult noted. Silvadene cream, wet dressing for now. Avoid chemical DVT-P with recent bleeding. Fall risk protocol. X-RAY RLE pending. ID consult.     - Syncope and collapse.    Plan: Likely vasovagal as it happened when patient came out of shower,   awaiting echo/carotid US results.   cw tele    -Essential hypertension.   Plan: DASH diet, will re-start metoprolol if stable in hospital.     - Glucose intolerance (impaired glucose tolerance).   Plan: Serum glucose elevated on chemistry, ADA diet, Accuchecks AC/HS, HISS, check A!C.     - Chronic anemia.    Plan: stable, improved from prior admission.     - CKD (chronic kidney disease) stage 2, GFR 60-89 ml/min.   Plan: Stable, avoid nephrotoxins,

## 2017-11-28 LAB
ANION GAP SERPL CALC-SCNC: 9 MMOL/L — SIGNIFICANT CHANGE UP (ref 5–17)
BASOPHILS # BLD AUTO: 0 K/UL — SIGNIFICANT CHANGE UP (ref 0–0.2)
BASOPHILS NFR BLD AUTO: 0.2 % — SIGNIFICANT CHANGE UP (ref 0–2)
BUN SERPL-MCNC: 20 MG/DL — SIGNIFICANT CHANGE UP (ref 8–20)
CALCIUM SERPL-MCNC: 8.3 MG/DL — LOW (ref 8.6–10.2)
CHLORIDE SERPL-SCNC: 105 MMOL/L — SIGNIFICANT CHANGE UP (ref 98–107)
CO2 SERPL-SCNC: 25 MMOL/L — SIGNIFICANT CHANGE UP (ref 22–29)
CREAT SERPL-MCNC: 1.05 MG/DL — SIGNIFICANT CHANGE UP (ref 0.5–1.3)
EOSINOPHIL # BLD AUTO: 0.2 K/UL — SIGNIFICANT CHANGE UP (ref 0–0.5)
EOSINOPHIL NFR BLD AUTO: 4.1 % — SIGNIFICANT CHANGE UP (ref 0–5)
GLUCOSE BLDC GLUCOMTR-MCNC: 105 MG/DL — HIGH (ref 70–99)
GLUCOSE BLDC GLUCOMTR-MCNC: 109 MG/DL — HIGH (ref 70–99)
GLUCOSE BLDC GLUCOMTR-MCNC: 121 MG/DL — HIGH (ref 70–99)
GLUCOSE BLDC GLUCOMTR-MCNC: 124 MG/DL — HIGH (ref 70–99)
GLUCOSE SERPL-MCNC: 135 MG/DL — HIGH (ref 70–115)
HBA1C BLD-MCNC: 5.6 % — SIGNIFICANT CHANGE UP (ref 4–5.6)
HCT VFR BLD CALC: 24.9 % — LOW (ref 42–52)
HGB BLD-MCNC: 8.2 G/DL — LOW (ref 14–18)
LYMPHOCYTES # BLD AUTO: 1.5 K/UL — SIGNIFICANT CHANGE UP (ref 1–4.8)
LYMPHOCYTES # BLD AUTO: 28.5 % — SIGNIFICANT CHANGE UP (ref 20–55)
MAGNESIUM SERPL-MCNC: 1.9 MG/DL — SIGNIFICANT CHANGE UP (ref 1.6–2.6)
MCHC RBC-ENTMCNC: 26.6 PG — LOW (ref 27–31)
MCHC RBC-ENTMCNC: 32.9 G/DL — SIGNIFICANT CHANGE UP (ref 32–36)
MCV RBC AUTO: 80.8 FL — SIGNIFICANT CHANGE UP (ref 80–94)
MONOCYTES # BLD AUTO: 0.6 K/UL — SIGNIFICANT CHANGE UP (ref 0–0.8)
MONOCYTES NFR BLD AUTO: 12 % — HIGH (ref 3–10)
NEUTROPHILS # BLD AUTO: 2.8 K/UL — SIGNIFICANT CHANGE UP (ref 1.8–8)
NEUTROPHILS NFR BLD AUTO: 54.6 % — SIGNIFICANT CHANGE UP (ref 37–73)
PHOSPHATE SERPL-MCNC: 3 MG/DL — SIGNIFICANT CHANGE UP (ref 2.4–4.7)
PLATELET # BLD AUTO: 234 K/UL — SIGNIFICANT CHANGE UP (ref 150–400)
POTASSIUM SERPL-MCNC: 4.4 MMOL/L — SIGNIFICANT CHANGE UP (ref 3.5–5.3)
POTASSIUM SERPL-SCNC: 4.4 MMOL/L — SIGNIFICANT CHANGE UP (ref 3.5–5.3)
RBC # BLD: 3.08 M/UL — LOW (ref 4.6–6.2)
RBC # FLD: 13.5 % — SIGNIFICANT CHANGE UP (ref 11–15.6)
SODIUM SERPL-SCNC: 139 MMOL/L — SIGNIFICANT CHANGE UP (ref 135–145)
WBC # BLD: 5.2 K/UL — SIGNIFICANT CHANGE UP (ref 4.8–10.8)
WBC # FLD AUTO: 5.2 K/UL — SIGNIFICANT CHANGE UP (ref 4.8–10.8)

## 2017-11-28 PROCEDURE — 99222 1ST HOSP IP/OBS MODERATE 55: CPT

## 2017-11-28 PROCEDURE — 99233 SBSQ HOSP IP/OBS HIGH 50: CPT

## 2017-11-28 PROCEDURE — 73719 MRI LOWER EXTREMITY W/DYE: CPT | Mod: 26,RT

## 2017-11-28 RX ADMIN — Medication 1 APPLICATION(S): at 12:49

## 2017-11-28 RX ADMIN — TAMSULOSIN HYDROCHLORIDE 0.4 MILLIGRAM(S): 0.4 CAPSULE ORAL at 21:54

## 2017-11-28 RX ADMIN — SODIUM CHLORIDE 3 MILLILITER(S): 9 INJECTION INTRAMUSCULAR; INTRAVENOUS; SUBCUTANEOUS at 12:50

## 2017-11-28 RX ADMIN — SODIUM CHLORIDE 3 MILLILITER(S): 9 INJECTION INTRAMUSCULAR; INTRAVENOUS; SUBCUTANEOUS at 21:54

## 2017-11-28 RX ADMIN — SODIUM CHLORIDE 3 MILLILITER(S): 9 INJECTION INTRAMUSCULAR; INTRAVENOUS; SUBCUTANEOUS at 05:07

## 2017-11-28 NOTE — CONSULT NOTE ADULT - SUBJECTIVE AND OBJECTIVE BOX
[facility]                                                        Office: [address] [address]                                                       Telephone: [phone]. Fax:[phone]                                                              CARDIOLOGY CONSULTATION NOTE                                                                                             Consult requested by:  Dr Bailee King    Reason for Consultation: Preop risk assessment    History obtained by: Patient and medical record     obtained: No    Chief complaint:    Patient is a 67y old  Male who presents with a chief complaint of Right leg bleeding (27 Nov 2017 06:07)      HPI:  66y/o male with hx. of chronic RLE venous stasis ulcer for past 3 years who was being followed by private Vascular surgeon until earlier this year presents to ED by EMS after wound started bleeding spontaneously. Patient states he is no longer following with Dr. Aguirre, because they dont take his insurance. He has been using medihoney and dry dressing at home. Denies any recent wound drainage or foul smell. No fever, chills, N/V, SOB, CP. He states he has had vascular studies in past showing sufficient blood flow to lower leg. Patient was taking a shower after he saw the bleeding and then had a syncopal episode witnessed by his wife. She denies any seizure like activity. No hx of CAD/CVA. Patient in ED with non-diagnostic CVA w/u. Labs at baseline. No focal weakness. EKG with no acute changes. RLE wound with no evidence of active infection. He will be admitted for Vascular surgery/wound care eval and syncope w/u. (27 Nov 2017 06:07)        REVIEW OF SYMPTOMS: Cardiovascular:  See HPI. No chest pain,  No dyspnea,  No syncope,  No palpitations, No dizziness, No Orthopnea,      No Paroxsymal nocturnal dyspnea;  Respiratory:  No Dyspnea, No cough,     Genitourinary:  No dysuria, no hematuria; Gastrointestinal:  No nausea, no vomiting. No diarrhea.  No abdominal pain. No dark color stool, no melena ; Neurological: No headache, no dizziness, no slurred speech;  Psychiatric: No agitation, no anxiety.  ALL OTHER REVIEW OF SYSTEMS ARE NEGATIVE.    ALLERGIES: Allergies    No Known Allergies    Intolerances    CURRENT MEDICATIONS:  tamsulosin 0.4 milliGRAM(s) Oral at bedtime     Dakins Solution - 1/2 Strength  dextrose 5%.  dextrose 50% Injectable  dextrose 50% Injectable  dextrose 50% Injectable  insulin lispro (HumaLOG) corrective regimen sliding scale  silver sulfADIAZINE 1% Cream  sodium chloride 0.9% lock flush      HOME MEDICATIONS:  Home Medications:  Aspirin Enteric Coated 81 mg oral delayed release tablet: 1 tab(s) orally once a day (27 Nov 2017 06:39)  Flomax 0.4 mg oral capsule: 1 cap(s) orally once a day (03 May 2016 21:32)  metoprolol succinate 50 mg oral tablet, extended release: 1 tab(s) orally once a day (03 May 2016 21:32)        PAST MEDICAL HISTORY  Chronic anemia  CKD (chronic kidney disease) stage 2, GFR 60-89 ml/min  Glucose intolerance (impaired glucose tolerance)  MRSA (methicillin resistant Staphylococcus aureus) infection  Venous stasis ulcer  Hypertension      PAST SURGICAL HISTORY  No significant past surgical history      FAMILY HISTORY:  No pertinent family history in first degree relatives      SOCIAL HISTORY:  Denies smoking/alcohol/drugs    CIGARETTES:       ALCOHOL:    DRUGS:    Vital Signs Last 24 Hrs  T(C): 36.6 (28 Nov 2017 11:00), Max: 37.1 (27 Nov 2017 19:53)  T(F): 97.9 (28 Nov 2017 11:00), Max: 98.7 (27 Nov 2017 19:53)  HR: 66 (28 Nov 2017 11:00) (66 - 76)  BP: 145/81 (28 Nov 2017 11:00) (120/67 - 156/84)  BP(mean): --  RR: 18 (28 Nov 2017 11:00) (16 - 18)  SpO2: 100% (28 Nov 2017 04:48) (99% - 100%)      PHYSICAL EXAM:  Constitutional: Comfortable . No acute distress.   HEENT: Atraumatic and normcephalic , neck is supple . no JVD. No carotid bruit. PEERL   CNS: A&Ox3. No focal deficits. EOMI. Cranial nerves II-IX are intact.   Lymph Nodes: Cervical : Not palpable.  Respiratory: CTAB  Cardiovascular: S1S2 RRR. No murmur/rubs or gallop.  Gastrointestinal: Soft non-tender and non distended . +Bowel sounds. negative Segovia's sign.  Extremities: No edema.   Psychiatric: Calm . no agitation.  Skin: No skin rash/ulcers visualized to face, hands or feet.    Intake and output:   11-27 @ 07:01  -  11-28 @ 07:00  --------------------------------------------------------  IN: 0 mL / OUT: 800 mL / NET: -800 mL        LABS:                        8.2    5.2   )-----------( 234      ( 28 Nov 2017 05:48 )             24.9     11-28    139  |  105  |  20.0  ----------------------------<  135<H>  4.4   |  25.0  |  1.05    Ca    8.3<L>      28 Nov 2017 05:48  Phos  3.0     11-28  Mg     1.9     11-28    TPro  7.4  /  Alb  2.8<L>  /  TBili  0.3<L>  /  DBili  x   /  AST  11  /  ALT  7   /  AlkPhos  66  11-27    CARDIAC MARKERS ( 27 Nov 2017 03:56 )  x     / <0.01 ng/mL / 97 U/L / x     / x        ;p-BNP=Serum Pro-Brain Natriuretic Peptide: 164 pg/mL (11-27 @ 03:56)    PT/INR - ( 27 Nov 2017 03:56 )   PT: 12.6 sec;   INR: 1.14 ratio               INTERPRETATION OF TELEMETRY: Reviewed by me.   ECG: Reviewed by me.     RADIOLOGY & ADDITIONAL STUDIES:    X-ray:  reviewed by me.   CT scan:   MRI:   ECHO FINDINGS: Date:                : LVEF=          ; RV function:       ; Valvular abnormalities: No significant valvular abnormality.  Mitral valve:           ;  Aortic valve:              ;Tricuspid valve:         ; Pulmonary pressures:        mm Hg. Pericardium:      STRESS  FINDINGS: Date:            ;      CATHETERIZATION FINDINGS:  Date:              :  LAD:                       ;  LCx:                        ; RCA:                ; [place] [facility]                                                        Office: [address] [address]                                                       Telephone: [phone]. Fax:[phone]                                                              CARDIOLOGY CONSULTATION NOTE                                                                                             Consult requested by:  Dr Bailee King    Reason for Consultation: Preop risk assessment    History obtained by: Patient and medical record     obtained: No    Chief complaint:    Patient is a 67y old  Male who presents with a chief complaint of Right leg bleeding (2017 06:07)      HPI:  68y/o male with hx. of chronic RLE venous stasis ulcer for past 3 years who was being followed by private Vascular surgeon until earlier this year presents to ED by EMS after wound started bleeding spontaneously. Patient states he is no longer following with Dr. Aguirre, because they dont take his insurance. He has been using medihoney and dry dressing at home. Denies any recent wound drainage or foul smell. No fever, chills, N/V, SOB, CP. He states he has had vascular studies in past showing sufficient blood flow to lower leg. Patient was taking a shower after he saw the bleeding and then had a syncopal episode witnessed by his wife. She denies any seizure like activity. No hx of CAD/CVA. Patient in ED with non-diagnostic CVA w/u. Labs at baseline. No focal weakness. EKG with no acute changes. RLE wound with no evidence of active infection. He will be admitted for Vascular surgery/wound care eval and syncope w/u. (2017 06:07)    Patient is a 67 year old male with history of Borderline DM, HTN, No history of  CVA/TIA. Patient has no history of CAD/CHF. Patient denies any cardiopulmonary complaints. Patient is a non smoker.      REVIEW OF SYMPTOMS: Cardiovascular:  See HPI. No chest pain,  No dyspnea,  No syncope,  No palpitations, No dizziness, No Orthopnea,      No Paroxsymal nocturnal dyspnea;  Respiratory:  No Dyspnea, No cough,     Genitourinary:  No dysuria, no hematuria; Gastrointestinal:  No nausea, no vomiting. No diarrhea.  No abdominal pain. No dark color stool, no melena ; Neurological: No headache, no dizziness, no slurred speech;  Psychiatric: No agitation, no anxiety.  ALL OTHER REVIEW OF SYSTEMS ARE NEGATIVE.    ALLERGIES: Allergies    No Known Allergies    Intolerances    CURRENT MEDICATIONS:  tamsulosin 0.4 milliGRAM(s) Oral at bedtime     Dakins Solution - 1/2 Strength  dextrose 5%.  dextrose 50% Injectable  dextrose 50% Injectable  dextrose 50% Injectable  insulin lispro (HumaLOG) corrective regimen sliding scale  silver sulfADIAZINE 1% Cream  sodium chloride 0.9% lock flush      HOME MEDICATIONS:  Home Medications:  Aspirin Enteric Coated 81 mg oral delayed release tablet: 1 tab(s) orally once a day (2017 06:39)  Flomax 0.4 mg oral capsule: 1 cap(s) orally once a day (03 May 2016 21:32)  metoprolol succinate 50 mg oral tablet, extended release: 1 tab(s) orally once a day (03 May 2016 21:32)        PAST MEDICAL HISTORY  Chronic anemia  CKD (chronic kidney disease) stage 2, GFR 60-89 ml/min  Glucose intolerance (impaired glucose tolerance)  MRSA (methicillin resistant Staphylococcus aureus) infection  Venous stasis ulcer  Hypertension      PAST SURGICAL HISTORY  No significant past surgical history      FAMILY HISTORY:  No pertinent family history in first degree relatives  Father  in his 50' s from cancer  Mother  in 50's from cancer    SOCIAL HISTORY:  Denies smoking/alcohol/drugs  , works as a .        Vital Signs Last 24 Hrs  T(C): 36.6 (2017 11:00), Max: 37.1 (2017 19:53)  T(F): 97.9 (2017 11:00), Max: 98.7 (2017 19:53)  HR: 66 (2017 11:00) (66 - 76)  BP: 145/81 (2017 11:00) (120/67 - 156/84)  BP(mean): --  RR: 18 (2017 11:00) (16 - 18)  SpO2: 100% (2017 04:48) (99% - 100%)      PHYSICAL EXAM:  Constitutional: Comfortable . No acute distress.   HEENT: Atraumatic and normcephalic , neck is supple . no JVD. No carotid bruit. PEERL   CNS: A&Ox3. No focal deficits. EOMI. Cranial nerves II-IX are intact.   Lymph Nodes: Cervical : Not palpable.  Respiratory: CTAB  Cardiovascular: S1S2 RRR. No murmur/rubs or gallop.  Gastrointestinal: Soft non-tender and non distended . +Bowel sounds. Extremities: No edema.   Psychiatric: Calm . no agitation.    Right lower extremity- Covered with dressings and ACE wrap    Intake and output:    @ 07:01  -   @ 07:00  --------------------------------------------------------  IN: 0 mL / OUT: 800 mL / NET: -800 mL        LABS:                        8.2    5.2   )-----------( 234      ( 2017 05:48 )             24.9         139  |  105  |  20.0  ----------------------------<  135<H>  4.4   |  25.0  |  1.05    Ca    8.3<L>      2017 05:48  Phos  3.0       Mg     1.9         TPro  7.4  /  Alb  2.8<L>  /  TBili  0.3<L>  /  DBili  x   /  AST  11  /  ALT  7   /  AlkPhos  66      CARDIAC MARKERS ( 2017 03:56 )  x     / <0.01 ng/mL / 97 U/L / x     / x        ;p-BNP=Serum Pro-Brain Natriuretic Peptide: 164 pg/mL ( @ 03:56)    PT/INR - ( 2017 03:56 )   PT: 12.6 sec;   INR: 1.14 ratio               INTERPRETATION OF TELEMETRY: Reviewed by me.   ECG: Reviewed by me. NSR, Nonspecific T wave changes.     < from: TTE Echo Complete w/Doppler (17 @ 09:07) >  Summary:   1. Left ventricular ejection fraction, by visual estimation, is 55 to   60%.   2. Normal global left ventricular systolic function.   3. Spectral Doppler shows impaired relaxation pattern of left   ventricular myocardial filling (Grade I diastolic dysfunction).   4. There is moderate concentric left ventricular hypertrophy.   5. There is no evidence of pericardial effusion.   6. Mild mitral valve regurgitation.   7. Thickening of the anterior and posterior mitral valve leaflets.     MD Esdras Electronically signed on 2017 at 8:20:35 PM    < end of copied text >

## 2017-11-28 NOTE — PROGRESS NOTE ADULT - SUBJECTIVE AND OBJECTIVE BOX
Patient is a 67y old  Male who presents with a chief complaint of Right leg bleeding (27 Nov 2017 06:07)    HPI:  68y/o male with hx. of chronic RLE venous stasis ulcer for past 3 years who was being followed by private Vascular surgeon until earlier this year presents to ED by EMS after wound started bleeding spontaneously. Patient states he is no longer following with Dr. Aguirre, because they dont take his insurance. He has been using medihoney and dry dressing at home. Denies any recent wound drainage or foul smell. No fever, chills, N/V, SOB, CP. He states he has had vascular studies in past showing sufficient blood flow to lower leg. Patient was taking a shower after he saw the bleeding and then had a syncopal episode witnessed by his wife. She denies any seizure like activity. No hx of CAD/CVA. Patient in ED with non-diagnostic CVA w/u. Labs at baseline. No focal weakness. EKG with no acute changes. RLE wound with no evidence of active infection. He will be admitted for Vascular surgery/wound care eval and syncope w/u. (27 Nov 2017 06:07)    ROS:  PAST MEDICAL & SURGICAL HISTORY:  Chronic anemia  CKD (chronic kidney disease) stage 2, GFR 60-89 ml/min  Glucose intolerance (impaired glucose tolerance)  MRSA (methicillin resistant Staphylococcus aureus) infection: inner thigh 3/2016  Venous stasis ulcer  Hypertension  No significant past surgical history    MEDICATIONS  (STANDING):  Dakins Solution - 1/2 Strength 1 Application(s) Topical daily  dextrose 5%. 1000 milliLiter(s) (50 mL/Hr) IV Continuous <Continuous>  dextrose 50% Injectable 12.5 Gram(s) IV Push once  dextrose 50% Injectable 25 Gram(s) IV Push once  dextrose 50% Injectable 25 Gram(s) IV Push once  insulin lispro (HumaLOG) corrective regimen sliding scale   SubCutaneous Before meals and at bedtime  silver sulfADIAZINE 1% Cream 1 Application(s) Topical daily  sodium chloride 0.9% lock flush 3 milliLiter(s) IV Push every 8 hours  tamsulosin 0.4 milliGRAM(s) Oral at bedtime    MEDICATIONS  (PRN):  dextrose Gel 1 Dose(s) Oral once PRN Blood Glucose LESS THAN 70 milliGRAM(s)/deciliter  glucagon  Injectable 1 milliGRAM(s) IntraMuscular once PRN Glucose LESS THAN 70 milligrams/deciliter  ondansetron Injectable 4 milliGRAM(s) IV Push every 6 hours PRN Nausea    EXAM:  Vital Signs Last 24 Hrs  T(C): 36.6 (28 Nov 2017 11:00), Max: 37.1 (27 Nov 2017 19:53)  T(F): 97.9 (28 Nov 2017 11:00), Max: 98.7 (27 Nov 2017 19:53)  HR: 66 (28 Nov 2017 11:00) (66 - 76)  BP: 145/81 (28 Nov 2017 11:00) (120/67 - 156/84)  BP(mean): --  RR: 18 (28 Nov 2017 11:00) (16 - 18)  SpO2: 100% (28 Nov 2017 04:48) (99% - 100%)    11-27 @ 07:01  -  11-28 @ 07:00  --------------------------------------------------------  IN: 0 mL / OUT: 800 mL / NET: -800 mL    GENERAL NAD,   HEENT: NORMAL CEPHALIC ATRAUMATIC, EOMI, MOIST MUCUS MEBRANES  NECK SUPPLE  CVS S1S2, RRR,   RESP BLCTA, NO RHONCHI  ABD SOFT, NON TENDER, NON DISTENDED  EXT NO EDEMA  NEURO MOVES ALL 4 EXTREMITES  PSYCH APPROPRIATE MOOD  SKIN WARM, DRY    LABS:  PT/INR - ( 27 Nov 2017 03:56 )   PT: 12.6 sec;   INR: 1.14 ratio    LIVER FUNCTIONS - ( 27 Nov 2017 03:56 )  Alb: 2.8 g/dL / Pro: 7.4 g/dL / ALK PHOS: 66 U/L / ALT: 7 U/L / AST: 11 U/L / GGT: x                               8.2    5.2   )-----------( 234      ( 28 Nov 2017 05:48 )             24.9   11-28  139  |  105  |  20.0  ----------------------------<  135<H>  4.4   |  25.0  |  1.05  Ca    8.3<L>      28 Nov 2017 05:48  Phos  3.0     11-28  Mg     1.9     11-28  TPro  7.4  /  Alb  2.8<L>  /  TBili  0.3<L>  /  DBili  x   /  AST  11  /  ALT  7   /  AlkPhos  66  11-27  CARDIAC MARKERS ( 27 Nov 2017 03:56 )  x     / <0.01 ng/mL / 97 U/L / x     / x chief complaint of Right leg bleeding (27 Nov 2017 06:07)    HPI:  68y/o male with hx. of chronic RLE venous stasis ulcer for past 3 years who was being followed by private Vascular surgeon until earlier this year presents to ED by EMS after wound started bleeding spontaneously. Patient states he is no longer following with Dr. Aguirre, because they dont take his insurance. He has been using medihoney and dry dressing at home. Denies any recent wound drainage or foul smell. No fever, chills, N/V, SOB, CP. He states he has had vascular studies in past showing sufficient blood flow to lower leg. Patient was taking a shower after he saw the bleeding and then had a syncopal episode witnessed by his wife. She denies any seizure like activity. No hx of CAD/CVA.     Today:  Pt notes bleeding has stopped to his RLE ulcer. No LOC.      ROS:  no dizziness  no chest pain  no sob  no loc    PAST MEDICAL & SURGICAL HISTORY:  Chronic anemia  CKD (chronic kidney disease) stage 2, GFR 60-89 ml/min  Glucose intolerance (impaired glucose tolerance)  MRSA (methicillin resistant Staphylococcus aureus) infection: inner thigh 3/2016  Venous stasis ulcer  Hypertension    MEDICATIONS  (STANDING):  Dakins Solution - 1/2 Strength 1 Application(s) Topical daily  dextrose 5%. 1000 milliLiter(s) (50 mL/Hr) IV Continuous <Continuous>  dextrose 50% Injectable 12.5 Gram(s) IV Push once  dextrose 50% Injectable 25 Gram(s) IV Push once  dextrose 50% Injectable 25 Gram(s) IV Push once  insulin lispro (HumaLOG) corrective regimen sliding scale   SubCutaneous Before meals and at bedtime  silver sulfADIAZINE 1% Cream 1 Application(s) Topical daily  sodium chloride 0.9% lock flush 3 milliLiter(s) IV Push every 8 hours  tamsulosin 0.4 milliGRAM(s) Oral at bedtime    MEDICATIONS  (PRN):  dextrose Gel 1 Dose(s) Oral once PRN Blood Glucose LESS THAN 70 milliGRAM(s)/deciliter  glucagon  Injectable 1 milliGRAM(s) IntraMuscular once PRN Glucose LESS THAN 70 milligrams/deciliter  ondansetron Injectable 4 milliGRAM(s) IV Push every 6 hours PRN Nausea    EXAM:  Vital Signs Last 24 Hrs  T(C): 36.6 (28 Nov 2017 11:00), Max: 37.1 (27 Nov 2017 19:53)  T(F): 97.9 (28 Nov 2017 11:00), Max: 98.7 (27 Nov 2017 19:53)  HR: 66 (28 Nov 2017 11:00) (66 - 76)  BP: 145/81 (28 Nov 2017 11:00) (120/67 - 156/84)  BP(mean): --  RR: 18 (28 Nov 2017 11:00) (16 - 18)  SpO2: 100% (28 Nov 2017 04:48) (99% - 100%)    GENERAL NAD, LAYING IN BED	.  HEENT: NORMAL CEPHALIC ATRAUMATIC  NECK SUPPLE  CVS S1S2, RRR,   RESP BLCTA, NO RHONCHI  ABD SOFT, NON TENDER, NON DISTENDED  EXT NO EDEMA. +RLE ULCER AT MEDIAL TIBIAL REGION WITH DRESSING   NEURO MOVES ALL 4 EXTREMITES  PSYCH APPROPRIATE MOOD  SKIN WARM, DRY    LABS:  PT/INR - ( 27 Nov 2017 03:56 )   PT: 12.6 sec;   INR: 1.14 ratio    LIVER FUNCTIONS - ( 27 Nov 2017 03:56 )  Alb: 2.8 g/dL / Pro: 7.4 g/dL / ALK PHOS: 66 U/L / ALT: 7 U/L / AST: 11 U/L / GGT: x                               8.2    5.2   )-----------( 234      ( 28 Nov 2017 05:48 )             24.9   11-28  139  |  105  |  20.0  ----------------------------<  135<H>  4.4   |  25.0  |  1.05  Ca    8.3<L>      28 Nov 2017 05:48  Phos  3.0     11-28  Mg     1.9     11-28  TPro  7.4  /  Alb  2.8<L>  /  TBili  0.3<L>  /  DBili  x   /  AST  11  /  ALT  7   /  AlkPhos  66  11-27  CARDIAC MARKERS ( 27 Nov 2017 03:56 )  x     / <0.01 ng/mL / 97 U/L / x     / x

## 2017-11-28 NOTE — PROGRESS NOTE ADULT - SUBJECTIVE AND OBJECTIVE BOX
The patient is a 67y old male who presents with a non healing ulcer on his right lower leg that    he has been dealing with for some 10 years.(2017 06:07) He is scheduled to have a  DEBRIDEMENT, INCISIONAL BIOPSY, POSSIBLE INTEGRA, APPLICATION OF WOUND VAC.     PAST MEDICAL HISTORY:  L.V.E.F. = 55 to 60%  Diabetes x 6 years  Hypertension x 9 years  Chronic anemia  CKD (chronic kidney disease) stage 2, GFR 60-89 ml/min  MRSA (methicillin resistant Staphylococcus aureus) infection  Venous stasis ulcer  B.P.H.    PAST SURGICAL HISTORY:  No past surgical history    MEDICATIONS  (STANDING):  Dakins Solution - 1/2 Strength 1 Application(s) Topical daily  dextrose 5%. 1000 milliLiter(s) (50 mL/Hr) IV Continuous <Continuous>  dextrose 50% Injectable 12.5 Gram(s) IV Push once  dextrose 50% Injectable 25 Gram(s) IV Push once  dextrose 50% Injectable 25 Gram(s) IV Push once  insulin lispro (HumaLOG) corrective regimen sliding scale   SubCutaneous Before meals and at bedtime  silver sulfADIAZINE 1% Cream 1 Application(s) Topical daily  sodium chloride 0.9% lock flush 3 milliLiter(s) IV Push every 8 hours  tamsulosin 0.4 milliGRAM(s) Oral at bedtime    MEDICATIONS  (PRN):  dextrose Gel 1 Dose(s) Oral once PRN Blood Glucose LESS THAN 70 milliGRAM(s)/deciliter  glucagon  Injectable 1 milliGRAM(s) IntraMuscular once PRN Glucose LESS THAN 70 milligrams/deciliter  ondansetron Injectable 4 milliGRAM(s) IV Push every 6 hours PRN Nausea      Allergies:  No Known Drug Allergies      SOCIAL HISTORY:    The patient does not drink alcohol, smoke or use illicit drugs.                          8.2    5.2   )-----------( 234      ( 2017 05:48 )             24.9     PT/INR - ( 2017 03:56 )   PT: 12.6 sec;   INR: 1.14 ratio      -    139  |  105  |  20.0  ----------------------------<  135<H>  4.4   |  25.0  |  1.05    Ca    8.3<L>      2017 05:48  Phos  3.0       Mg     1.9         TPro  7.4  /  Alb  2.8<L>  /  TBili  0.3<L>  /  DBili  x   /  AST  11  /  ALT  7   /  AlkPhos  66      [id number]  Normal sinus rhythm  Nonspecific T wave abnormality  Abnormal ECG    TT ECHO:   2017   Summary:   1. Left ventricular ejection fraction, by visual estimation, is 55 to        60%.   2. Normal global left ventricular systolic function.   3. Spectral Doppler shows impaired relaxation pattern of left         ventricular myocardial filling (Grade I diastolic dysfunction).   4. There is moderate concentric left ventricular hypertrophy.   5. There is no evidence of pericardial effusion.   6. Mild mitral valve regurgitation.   7. Thickening of the anterior and posterior mitral valve leaflets.    TIBIA FIBULA-RIGHT   -  2017    FINDINGS:  No prior studies are available for review.  There is diffuse soft tissue swelling with possible ulceration of the   skin in the posterior distal calf. There is diffuse periosteal reaction   noted throughout the tibial fibula without fracture deformity or lytic   lesion. Findings either represent osteomyelitis versus periostitis from   venous insufficiency. There is degenerative osteophytes arise of the knee   joint.  IMPRESSION : Diffuse periostitis either representing osteomyelitis versus   diffuse periosteal reaction secondary to a chronic venous insufficiency.    ASA # = 2   Mallampati # = 3   (upper and lower dentures)

## 2017-11-28 NOTE — CONSULT NOTE ADULT - ASSESSMENT
66y/o male with hx. of chronic RLE venous stasis ulcer for past 3 years who was being followed by private Vascular surgeon until earlier this year presents to ED by EMS after wound started bleeding spontaneously. Patient states he is no longer following with Dr. Aguirre, because they dont take his insurance. He has been using medihoney and dry dressing at home. Denies any recent wound drainage or foul smell. No fever, chills, N/V, SOB, CP. He states he has had vascular studies in past showing sufficient blood flow to lower leg. Patient was taking a shower after he saw the bleeding and then had a syncopal episode witnessed by his wife. She denies any seizure like activity. No hx of CAD/CVA. Patient in ED with non-diagnostic CVA w/u. Labs at baseline. No focal weakness. EKG with no acute changes . He will be admitted for Vascular surgery/wound care eval and syncope w/u.    NO FEVERS NO CHILLS
In summary, Patient is a 67 year old male with borderline NIDDM, HTN probably had a vasovagal syncope. Patient has no cardiopulmonary complaints. Patients exam and EKG as noted.    Diagnosis:  1. Abnormal EKG- Unremarkable echo  2. Syncope  3. Borderline NIDDM  4. HTN  5. Right leg ulcer    Recommendations:  1. Pharmacological nuclear stress.    If Patients nuclear stress test is normal, May proceed with the surgery.  Overall Patient is a low risk for perioperative cardiac events.
Chronic ulcer with history of venous hypertension - untreated with high liklihood of underlying osteomyelitis

## 2017-11-28 NOTE — CONSULT NOTE ADULT - ATTENDING COMMENTS
Thank you for allowing me to participate in care of your patient.   Please call as needed.
I was present for pertinent physical exam and the recommendations are mine.

## 2017-11-28 NOTE — PROGRESS NOTE ADULT - ASSESSMENT
68 y/o male with resolved episode of bleeding from RLE venous stasis ulcer, Syncope, hx of HTN, CKD-2, Chronic Anemia, Glucose Intolerance    - Venous stasis ulcer of right calf with other ulcer severity, unspecified whether varicose veins present.   Vascular/Wound care consult noted. Silvadene cream, wet dressing for now. Avoid chemical DVT-P with recent bleeding. Fall risk protocol. X-RAY RLE  suggestive of O.M. mri pending. ID noted. For debridement tomorrow by vascular. cardio consulted, stress in am    - Syncope and collapse.    Plan: Likely vasovagal as it happened when patient came out of shower,   sp echo/carotid US results.   cw tele    -Essential hypertension.   Plan: DASH diet, will re-start metoprolol if stable in hospital.     - Glucose intolerance (impaired glucose tolerance).   Plan: Serum glucose elevated on chemistry, ADA diet, Accuchecks AC/HS, HISS, check A!C.     - Chronic anemia.    Plan: stable, improved from prior admission.     - CKD (chronic kidney disease) stage 2, GFR 60-89 ml/min.   Plan: Stable, avoid nephrotoxins,

## 2017-11-28 NOTE — PROGRESS NOTE ADULT - SUBJECTIVE AND OBJECTIVE BOX
Vascular surgery follow up.    Right Le chronic ulceration.    Tib/Fib XRay reviewed, suspicious for osteomyelitis    Patient discussed with Dr Mcnulty (Plastic/reconstructive)    Surgical debridement tentatively planned for later this week    Patient requires cardiology risk stratification, discussed with Dr King     - Continue wound care with Dakins wet to dry.  -Will follow up MRI and ID recommendations     < from: Xray Tibia + Fibula 2 Views, Right (11.27.17 @ 19:15) >  EXAM:  TIBIA FIBULA-RIGHT                          PROCEDURE DATE:  11/27/2017          INTERPRETATION:  Radiographs of the right tibia and fibula         CLINICAL INFORMATION: Soft tissue swelling. Assess for osteomyelitis..    TECHNIQUE:  Frontal and lateral views of the tibia and fibula were   obtained.    FINDINGS:  No prior studies are available for review.    There is diffuse soft tissue swelling with possible ulceration of the   skin in the posterior distal calf. There is diffuse periosteal reaction   noted throughout the tibial fibula without fracture deformity or lytic   lesion. Findings either represent osteomyelitis versus periostitis from   venous insufficiency. There is degenerative osteophytes arise of the knee   joint.    IMPRESSION : Diffuse periostitis either representing osteomyelitis versus   diffuse periosteal reaction secondary to a chronic venous insufficiency.                    SUZANNE HINKLE M.D., ATTENDING RADIOLOGIST  This document has been electronically signed. Nov 28 2017  6:56AM                  < end of copied text >

## 2017-11-28 NOTE — CONSULT NOTE ADULT - SUBJECTIVE AND OBJECTIVE BOX
[facility] [place] ANTOINE RETANA MD FACP   TIGIST BOLDEN MD  Diplomates American Board of Internal Medicine and Infecctious Diseases  [phone]  [id number] ROSIBEL [id number]      HPI:  66y/o male with hx. of chronic RLE venous stasis ulcer for past 3 years who was being followed by private Vascular surgeon until earlier this year presents to ED by EMS after wound started bleeding spontaneously. Patient states he is no longer following with Dr. Aguirre, because they dont take his insurance. He has been using medihoney and dry dressing at home. Denies any recent wound drainage or foul smell. No fever, chills, N/V, SOB, CP. He states he has had vascular studies in past showing sufficient blood flow to lower leg. Patient was taking a shower after he saw the bleeding and then had a syncopal episode witnessed by his wife. She denies any seizure like activity. No hx of CAD/CVA. Patient in ED with non-diagnostic CVA w/u. Labs at baseline. No focal weakness. EKG with no acute changes . He will be admitted for Vascular surgery/wound care eval and syncope w/u.    NO FEVERS NO CHILLS   ASKED TO EVALUATE FROM ID STANDPOINT     PAST MEDICAL & SURGICAL HISTORY:  Chronic anemia  CKD (chronic kidney disease) stage 2, GFR 60-89 ml/min  Glucose intolerance (impaired glucose tolerance)  MRSA (methicillin resistant Staphylococcus aureus) infection: inner thigh 3/2016  Venous stasis ulcer  Hypertension  No significant past surgical history      ANTIBIOTICS      Allergies    No Known Allergies    Intolerances        SOCIAL HISTORY:    FAMILY HISTORY:  No pertinent family history in first degree relatives      Vital Signs Last 24 Hrs  T(C): 36.8 (28 Nov 2017 04:48), Max: 37.1 (27 Nov 2017 19:53)  T(F): 98.2 (28 Nov 2017 04:48), Max: 98.7 (27 Nov 2017 19:53)  HR: 67 (28 Nov 2017 04:48) (63 - 76)  BP: 132/58 (28 Nov 2017 04:48) (120/67 - 156/84)  BP(mean): --  RR: 18 (28 Nov 2017 04:48) (16 - 18)  SpO2: 100% (28 Nov 2017 04:48) (99% - 100%)  Drug Dosing Weight  Height (cm): 185.42 (27 Nov 2017 03:11)  Weight (kg): 104.3 (27 Nov 2017 03:11)  BMI (kg/m2): 30.3 (27 Nov 2017 03:11)  BSA (m2): 2.28 (27 Nov 2017 03:11)      REVIEW OF SYSTEMS:    CONSTITUTIONAL:  As per HPI.    HEENT:  Eyes:  No diplopia or blurred vision. ENT:  No earache, sore throat or runny nose.    CARDIOVASCULAR:  No pressure, squeezing, strangling, tightness, heaviness or aching about the chest, neck, axilla or epigastrium.    RESPIRATORY:  No cough, shortness of breath, PND or orthopnea.    GASTROINTESTINAL:  No nausea, vomiting or diarrhea.    GENITOURINARY:  No dysuria, frequency or urgency.    MUSCULOSKELETAL:  As per HPI.    SKIN:  No change in skin, hair or nails.    NEUROLOGIC:  No paresthesias, fasciculations, seizures or weakness.                  PHYSICAL EXAMINATION:    GENERAL: The patient is a well-developed, well-nourished ____IN NAD    VITAL SIGNS: T(C): 36.8 (11-28-17 @ 04:48), Max: 37.1 (11-27-17 @ 19:53)  HR: 67 (11-28-17 @ 04:48) (63 - 76)  BP: 132/58 (11-28-17 @ 04:48) (120/67 - 156/84)  RR: 18 (11-28-17 @ 04:48) (16 - 18)  SpO2: 100% (11-28-17 @ 04:48) (99% - 100%)  Wt(kg): --    HEENT: Head is normocephalic and atraumatic.  ANICTERIC  NECK: Supple. No carotid bruits.  No lymphadenopathy or thyromegaly.    LUNGS:COARSE BREATH SOUNDS    HEART: Regular rate and rhythm without murmur.    ABDOMEN: Soft, nontender, and nondistended.  Positive bowel sounds.  No hepatosplenomegaly was noted. NO REBOUND NO GUARDING    EXTREMITIES: RIGH LEG DEEP ULCER WITH AREAS OF  GRANULATION TISSUE AND  SMALL AREAS OF NECROTIC TISSUE    NEUROLOGIC: NON FOCAL               BLOOD CULTURES  NEG SO FAR       URINE CX          LABS:                        8.2    5.2   )-----------( 234      ( 28 Nov 2017 05:48 )             24.9     11-28    139  |  105  |  20.0  ----------------------------<  135<H>  4.4   |  25.0  |  1.05    Ca    8.3<L>      28 Nov 2017 05:48  Phos  3.0     11-28  Mg     1.9     11-28    TPro  7.4  /  Alb  2.8<L>  /  TBili  0.3<L>  /  DBili  x   /  AST  11  /  ALT  7   /  AlkPhos  66  11-27    PT/INR - ( 27 Nov 2017 03:56 )   PT: 12.6 sec;   INR: 1.14 ratio               RADIOLOGY & ADDITIONAL STUDIES:      ASSESSMENT/PLAN    66y/o male with hx. of chronic RLE venous stasis ulcer for past 3 years who was being followed by private Vascular surgeon until earlier this year presents to ED by EMS after wound started bleeding spontaneously. Patient states he is no longer following with Dr. Aguirre, because they dont take his insurance. He has been using medihoney and dry dressing at home. Denies any recent wound drainage or foul smell. No fever, chills, N/V, SOB, CP. He states he has had vascular studies in past showing sufficient blood flow to lower leg. Patient was taking a shower after he saw the bleeding and then had a syncopal episode witnessed by his wife. She denies any seizure like activity. No hx of CAD/CVA. Patient in ED with non-diagnostic CVA w/u. Labs at baseline. No focal weakness. EKG with no acute changes . He will be admitted for Vascular surgery/wound care eval and syncope w/u.    NO FEVERS NO CHILLS   BLOOD CX NEG SO FAR    AGREE WILL DEFER ABX  LOCAL CARE AS PER SURGERY WITH POSSIBLE DEBRIDEMENT  WILL FOLLOW UP                  TIGIST DELANEY MD

## 2017-11-29 LAB
GLUCOSE BLDC GLUCOMTR-MCNC: 109 MG/DL — HIGH (ref 70–99)
GLUCOSE BLDC GLUCOMTR-MCNC: 112 MG/DL — HIGH (ref 70–99)
GLUCOSE BLDC GLUCOMTR-MCNC: 122 MG/DL — HIGH (ref 70–99)

## 2017-11-29 PROCEDURE — 78452 HT MUSCLE IMAGE SPECT MULT: CPT | Mod: 26

## 2017-11-29 PROCEDURE — 93016 CV STRESS TEST SUPVJ ONLY: CPT

## 2017-11-29 PROCEDURE — 93018 CV STRESS TEST I&R ONLY: CPT

## 2017-11-29 PROCEDURE — 99233 SBSQ HOSP IP/OBS HIGH 50: CPT

## 2017-11-29 RX ORDER — LOSARTAN POTASSIUM 100 MG/1
50 TABLET, FILM COATED ORAL DAILY
Qty: 0 | Refills: 0 | Status: DISCONTINUED | OUTPATIENT
Start: 2017-11-29 | End: 2017-12-04

## 2017-11-29 RX ORDER — METOPROLOL TARTRATE 50 MG
50 TABLET ORAL DAILY
Qty: 0 | Refills: 0 | Status: DISCONTINUED | OUTPATIENT
Start: 2017-11-29 | End: 2017-12-04

## 2017-11-29 RX ADMIN — SODIUM CHLORIDE 3 MILLILITER(S): 9 INJECTION INTRAMUSCULAR; INTRAVENOUS; SUBCUTANEOUS at 06:06

## 2017-11-29 RX ADMIN — Medication 1 APPLICATION(S): at 16:25

## 2017-11-29 RX ADMIN — TAMSULOSIN HYDROCHLORIDE 0.4 MILLIGRAM(S): 0.4 CAPSULE ORAL at 21:17

## 2017-11-29 RX ADMIN — SODIUM CHLORIDE 3 MILLILITER(S): 9 INJECTION INTRAMUSCULAR; INTRAVENOUS; SUBCUTANEOUS at 21:14

## 2017-11-29 RX ADMIN — SODIUM CHLORIDE 3 MILLILITER(S): 9 INJECTION INTRAMUSCULAR; INTRAVENOUS; SUBCUTANEOUS at 10:38

## 2017-11-29 NOTE — PROGRESS NOTE ADULT - SUBJECTIVE AND OBJECTIVE BOX
Vascular Follow up.    Patient scheduled for Surgical Intervention today with Plastic Reconstruction service in the afternoon.    Stress test is pending for Cardiology Risk Assessment.    Keep NPO.

## 2017-11-29 NOTE — PROGRESS NOTE ADULT - SUBJECTIVE AND OBJECTIVE BOX
chief complaint of Right leg bleeding (27 Nov 2017 06:07)    HPI:  68y/o male with hx. of chronic RLE venous stasis ulcer for past 3 years who was being followed by private Vascular surgeon until earlier this year presents to ED by EMS after wound started bleeding spontaneously. Patient states he is no longer following with Dr. Aguirre, because they don't take his insurance. He has been using medi honey and dry dressing at home. Denies any recent wound drainage or foul smell. No fever, chills, N/V, SOB, CP. He states he has had vascular studies in past showing sufficient blood flow to lower leg. Patient was taking a shower after he saw the bleeding and then had a syncopal episode witnessed by his wife. She denies any seizure like activity. No hx of CAD/CVA.     Today:  Pt sp stress test in am. Called by cardiology, results are normal.     ROS:  no dizziness  no chest pain  no sob  no loc    PAST MEDICAL & SURGICAL HISTORY:  Chronic anemia  CKD (chronic kidney disease) stage 2, GFR 60-89 ml/min  Glucose intolerance (impaired glucose tolerance)  MRSA (methicillin resistant Staphylococcus aureus) infection: inner thigh 3/2016  Venous stasis ulcer  Hypertension  No significant past surgical history    MEDICATIONS  (STANDING):  Dakins Solution - 1/2 Strength 1 Application(s) Topical daily  dextrose 5%. 1000 milliLiter(s) (50 mL/Hr) IV Continuous <Continuous>  dextrose 50% Injectable 12.5 Gram(s) IV Push once  dextrose 50% Injectable 25 Gram(s) IV Push once  dextrose 50% Injectable 25 Gram(s) IV Push once  insulin lispro (HumaLOG) corrective regimen sliding scale   SubCutaneous Before meals and at bedtime  silver sulfADIAZINE 1% Cream 1 Application(s) Topical daily  sodium chloride 0.9% lock flush 3 milliLiter(s) IV Push every 8 hours  tamsulosin 0.4 milliGRAM(s) Oral at bedtime    MEDICATIONS  (PRN):  dextrose Gel 1 Dose(s) Oral once PRN Blood Glucose LESS THAN 70 milliGRAM(s)/deciliter  glucagon  Injectable 1 milliGRAM(s) IntraMuscular once PRN Glucose LESS THAN 70 milligrams/deciliter  ondansetron Injectable 4 milliGRAM(s) IV Push every 6 hours PRN Nausea    EXAM:  Vital Signs Last 24 Hrs  T(C): 36.9 (29 Nov 2017 06:05), Max: 36.9 (29 Nov 2017 06:05)  T(F): 98.4 (29 Nov 2017 06:05), Max: 98.4 (29 Nov 2017 06:05)  HR: 77 (29 Nov 2017 10:37) (76 - 78)  BP: 148/82 (29 Nov 2017 10:37) (122/60 - 148/82)  BP(mean): --  RR: 18 (29 Nov 2017 10:37) (18 - 18)  SpO2: 99% (29 Nov 2017 10:37) (98% - 100%)    GENERAL NAD, LAYING IN BED	.  HEENT: NORMAL CEPHALIC ATRAUMATIC  NECK SUPPLE  CVS S1S2, RRR,   RESP BLCTA, NO RHONCHI  ABD SOFT, NON TENDER, NON DISTENDED  EXT NO EDEMA. +RLE ULCER AT MEDIAL TIBIAL REGION WITH DRESSING   NEURO MOVES ALL 4 EXTREMITES  PSYCH APPROPRIATE MOOD  SKIN WARM, DRY    LABS:                    8.2    5.2   )-----------( 234      ( 28 Nov 2017 05:48 )             24.9   11-28  139  |  105  |  20.0  ----------------------------<  135<H>  4.4   |  25.0  |  1.05  Ca    8.3<L>      28 Nov 2017 05:48  Phos  3.0     11-28  Mg     1.9     11-28    STRESS TEST:  < from: Nuclear Stress Test-Pharmacologic (11.29.17 @ 07:39) >  MPRESSIONS:Normal Study  * Review of raw data shows: Increased bowel uptake.  * There is a small, moderate to severe defect in basal  inferior wall that is fixed consistent with attenuation  artifact.  * Gated wall motion analysis is performed, and shows  normal wall motion with post stress LVEF of 61%.  * Chest Pain: Nochest pain with administration of  Regadenoson.  * Symptom: No Symptom.  * HR Response: Appropriate.  * BP Response: Appropriate.  * Heart Rhythm: Normal Sinus Rhythm - 77 BPM.  * Baseline ECG: Nonspecific ST-T wave abnormality.  * ECG Abnormalities:There were no diagnostic changes.  * Arrhythmia: rare PAC  * rare PVC.  < end of copied text >    MRI RIGHT < from: MR Tib/Fib w/ IV Cont, Right (11.28.17 @ 21:30) >  IMPRESSION:       No evidence of acute osteomyelitis. Large ulcer at the posterior medial   aspect of the calf with adjacent subcutaneous edema/infiltration. No soft   tissue abscess.  < end of copied text >  LOWER EXTREMITY:

## 2017-11-29 NOTE — PROGRESS NOTE ADULT - ASSESSMENT
68 y/o male with resolved episode of bleeding from RLE venous stasis ulcer, Syncope, hx of HTN, CKD-2, Chronic Anemia, Glucose Intolerance    - Venous stasis ulcer of right calf with other ulcer severity, unspecified whether varicose veins present.   Vascular/Wound care consult noted. Silvadene cream, wet dressing for now. Avoid chemical DVT-P with recent bleeding.  MRI: no evidence of O.M.  For debridement by plastic surgery dr gupta. surgery to be rescheduled, possible Monday as pt ate.   sp stress test, pt is cardiac and medically cleared for right lower extremity debridement.     - Syncope and collapse.    Plan: Likely vasovagal as it happened when patient came out of shower,   sp echo/carotid US wnl.     -Essential hypertension.   Plan: DASH diet,  will resume metoprolol and losartan 50mg     - Glucose intolerance (impaired glucose tolerance).   Plan: Serum glucose elevated on chemistry, ADA diet, Accuchecks AC/HS, HISS,  A!C 5.6.     - Chronic anemia.    Plan: stable, improved from prior admission.     - CKD (chronic kidney disease) stage 2, GFR 60-89 ml/min.   Plan: Stable, avoid nephrotoxins,

## 2017-11-30 LAB
ANION GAP SERPL CALC-SCNC: 13 MMOL/L — SIGNIFICANT CHANGE UP (ref 5–17)
BUN SERPL-MCNC: 14 MG/DL — SIGNIFICANT CHANGE UP (ref 8–20)
CALCIUM SERPL-MCNC: 9 MG/DL — SIGNIFICANT CHANGE UP (ref 8.6–10.2)
CHLORIDE SERPL-SCNC: 104 MMOL/L — SIGNIFICANT CHANGE UP (ref 98–107)
CO2 SERPL-SCNC: 27 MMOL/L — SIGNIFICANT CHANGE UP (ref 22–29)
CREAT SERPL-MCNC: 0.91 MG/DL — SIGNIFICANT CHANGE UP (ref 0.5–1.3)
GLUCOSE BLDC GLUCOMTR-MCNC: 117 MG/DL — HIGH (ref 70–99)
GLUCOSE BLDC GLUCOMTR-MCNC: 131 MG/DL — HIGH (ref 70–99)
GLUCOSE BLDC GLUCOMTR-MCNC: 151 MG/DL — HIGH (ref 70–99)
GLUCOSE BLDC GLUCOMTR-MCNC: 94 MG/DL — SIGNIFICANT CHANGE UP (ref 70–99)
GLUCOSE SERPL-MCNC: 113 MG/DL — SIGNIFICANT CHANGE UP (ref 70–115)
HCT VFR BLD CALC: 26 % — LOW (ref 42–52)
HGB BLD-MCNC: 8.5 G/DL — LOW (ref 14–18)
MCHC RBC-ENTMCNC: 26.6 PG — LOW (ref 27–31)
MCHC RBC-ENTMCNC: 32.7 G/DL — SIGNIFICANT CHANGE UP (ref 32–36)
MCV RBC AUTO: 81.3 FL — SIGNIFICANT CHANGE UP (ref 80–94)
PLATELET # BLD AUTO: 251 K/UL — SIGNIFICANT CHANGE UP (ref 150–400)
POTASSIUM SERPL-MCNC: 4 MMOL/L — SIGNIFICANT CHANGE UP (ref 3.5–5.3)
POTASSIUM SERPL-SCNC: 4 MMOL/L — SIGNIFICANT CHANGE UP (ref 3.5–5.3)
RBC # BLD: 3.2 M/UL — LOW (ref 4.6–6.2)
RBC # FLD: 13.2 % — SIGNIFICANT CHANGE UP (ref 11–15.6)
SODIUM SERPL-SCNC: 144 MMOL/L — SIGNIFICANT CHANGE UP (ref 135–145)
WBC # BLD: 5 K/UL — SIGNIFICANT CHANGE UP (ref 4.8–10.8)
WBC # FLD AUTO: 5 K/UL — SIGNIFICANT CHANGE UP (ref 4.8–10.8)

## 2017-11-30 PROCEDURE — 99233 SBSQ HOSP IP/OBS HIGH 50: CPT

## 2017-11-30 PROCEDURE — 99232 SBSQ HOSP IP/OBS MODERATE 35: CPT

## 2017-11-30 RX ADMIN — LOSARTAN POTASSIUM 50 MILLIGRAM(S): 100 TABLET, FILM COATED ORAL at 06:04

## 2017-11-30 RX ADMIN — Medication 1 APPLICATION(S): at 17:10

## 2017-11-30 RX ADMIN — SODIUM CHLORIDE 3 MILLILITER(S): 9 INJECTION INTRAMUSCULAR; INTRAVENOUS; SUBCUTANEOUS at 21:08

## 2017-11-30 RX ADMIN — Medication 50 MILLIGRAM(S): at 06:04

## 2017-11-30 RX ADMIN — Medication 2: at 21:12

## 2017-11-30 RX ADMIN — SODIUM CHLORIDE 3 MILLILITER(S): 9 INJECTION INTRAMUSCULAR; INTRAVENOUS; SUBCUTANEOUS at 05:59

## 2017-11-30 RX ADMIN — TAMSULOSIN HYDROCHLORIDE 0.4 MILLIGRAM(S): 0.4 CAPSULE ORAL at 21:12

## 2017-11-30 RX ADMIN — SODIUM CHLORIDE 3 MILLILITER(S): 9 INJECTION INTRAMUSCULAR; INTRAVENOUS; SUBCUTANEOUS at 17:09

## 2017-11-30 NOTE — PROGRESS NOTE ADULT - SUBJECTIVE AND OBJECTIVE BOX
Internal Medicine Hospitalist - Dr. Ronnie DIALLO    [id number]    67y      Male    Patient is a 67y old  Male who presents with a chief complaint of Right leg bleeding (27 Nov 2017 06:07)    INTERVAL HPI/ OVERNIGHT EVENTS: Patient is seen and examined, denied fever, chills, cough and SOB    REVIEW OF SYSTEMS:    Denied fever, chills, abd. pain, nausea, vomiting, chest pain, SOB, headache, dizziness    PHYSICAL EXAM:    Vital Signs Last 24 Hrs  T(C): 36.6 (30 Nov 2017 11:00), Max: 37.1 (29 Nov 2017 15:42)  T(F): 97.9 (30 Nov 2017 11:00), Max: 98.8 (29 Nov 2017 15:42)  HR: 98 (30 Nov 2017 11:00) (73 - 98)  BP: 145/76 (30 Nov 2017 11:00) (138/78 - 146/72)  BP(mean): --  RR: 18 (30 Nov 2017 11:00) (17 - 18)  SpO2: 99% (30 Nov 2017 06:00) (99% - 100%)    GENERAL: NAD  HEENT: EOMI  Neck: supple  CHEST/LUNG: Clear to percussion bilaterally; No wheezing  HEART: S1S2+ audible  ABDOMEN: Soft, Nontender, Nondistended; Bowel sounds present  EXTREMITIES:  R leg dressing intact   CNS: AAO X 3  Psychiatry: normal mood    LABS:                        8.5    5.0   )-----------( 251      ( 30 Nov 2017 05:35 )             26.0     11-30    144  |  104  |  14.0  ----------------------------<  113  4.0   |  27.0  |  0.91    Ca    9.0      30 Nov 2017 05:35              MEDICATIONS  (STANDING):  Dakins Solution - 1/2 Strength 1 Application(s) Topical daily  dextrose 5%. 1000 milliLiter(s) (50 mL/Hr) IV Continuous <Continuous>  dextrose 50% Injectable 12.5 Gram(s) IV Push once  dextrose 50% Injectable 25 Gram(s) IV Push once  dextrose 50% Injectable 25 Gram(s) IV Push once  insulin lispro (HumaLOG) corrective regimen sliding scale   SubCutaneous Before meals and at bedtime  losartan 50 milliGRAM(s) Oral daily  metoprolol succinate ER 50 milliGRAM(s) Oral daily  silver sulfADIAZINE 1% Cream 1 Application(s) Topical daily  sodium chloride 0.9% lock flush 3 milliLiter(s) IV Push every 8 hours  tamsulosin 0.4 milliGRAM(s) Oral at bedtime    MEDICATIONS  (PRN):  dextrose Gel 1 Dose(s) Oral once PRN Blood Glucose LESS THAN 70 milliGRAM(s)/deciliter  glucagon  Injectable 1 milliGRAM(s) IntraMuscular once PRN Glucose LESS THAN 70 milligrams/deciliter  ondansetron Injectable 4 milliGRAM(s) IV Push every 6 hours PRN Nausea      RADIOLOGY & ADDITIONAL TEST

## 2017-11-30 NOTE — PROGRESS NOTE ADULT - ASSESSMENT
This is 66 y/o man with PMH of HTN, CKD, venous ulcer admitted for syncope and episode of bleeding from RLE, venous stasis ulcer. Pt is seen by cardiology. s/p stress test negative, getting wound care for venous statis, will need debridement.     A/P    > Venous stasis ulcer of right calf with other ulcer severity, unspecified whether varicose veins present.   Vascular/Wound care consult noted. Silvadene cream, wet dressing for now. Avoid chemical DVT-P with recent bleeding.  MRI: no evidence of O.M.  For debridement by plastic surgery dr gupta. rescheduled on Monday.    sp stress test, pt is cardiac and medically cleared for right lower extremity debridement.     - Syncope and collapse  appreciate cardiology input  Plan: Likely vasovagal as it happened when patient came out of shower,   sp echo/carotid US wnl.   pt can be downgraded to medical floor     -Essential hypertension.   Plan: DASH diet,  restarted metoprolol losartan 50mg     - Glucose intolerance (impaired glucose tolerance).   Plan: Serum glucose elevated on chemistry, ADA diet, Accuchecks AC/HS, HISS,  A!C 5.6.     - Chronic anemia.    Plan: stable, improved from prior admission.     - CKD (chronic kidney disease) stage 2, GFR 60-89 ml/min.   Plan: Stable, avoid nephrotoxins,

## 2017-11-30 NOTE — PROGRESS NOTE ADULT - SUBJECTIVE AND OBJECTIVE BOX
ALIZA DILALO is a 67y Male with HPI:   66y/o male with hx. of chronic RLE venous stasis ulcer for past 3 years who was being followed by private Vascular surgeon until earlier this year presents to ED by EMS after wound started bleeding spontaneously. Patient states he is no longer following with Dr. Aguirre, because they dont take his insurance. He has been using medihoney and dry dressing at home. Denies any recent wound drainage or foul smell. No fever, chills, N/V, SOB, CP. He states he has had vascular studies in past showing sufficient blood flow to lower leg. Patient was taking a shower after he saw the bleeding and then had a syncopal episode witnessed by his wife. She denies any seizure like activity. No hx of CAD/CVA. Patient in ED with non-diagnostic CVA w/u. Labs at baseline. No focal weakness. EKG with no acute changes . He will be admitted for Vascular surgery/wound care eval and syncope w/u.    NO FEVERS NO CHILLS          Allergies:  No Known Allergies      Medications:  Dakins Solution - 1/2 Strength 1 Application(s) Topical daily  dextrose 5%. 1000 milliLiter(s) IV Continuous <Continuous>  dextrose 50% Injectable 12.5 Gram(s) IV Push once  dextrose 50% Injectable 25 Gram(s) IV Push once  dextrose 50% Injectable 25 Gram(s) IV Push once  dextrose Gel 1 Dose(s) Oral once PRN  glucagon  Injectable 1 milliGRAM(s) IntraMuscular once PRN  insulin lispro (HumaLOG) corrective regimen sliding scale   SubCutaneous Before meals and at bedtime  losartan 50 milliGRAM(s) Oral daily  metoprolol succinate ER 50 milliGRAM(s) Oral daily  ondansetron Injectable 4 milliGRAM(s) IV Push every 6 hours PRN  silver sulfADIAZINE 1% Cream 1 Application(s) Topical daily  sodium chloride 0.9% lock flush 3 milliLiter(s) IV Push every 8 hours  tamsulosin 0.4 milliGRAM(s) Oral at bedtime      ANTIBIOTICS:         Review of Systems: - Negative except as mentioned above     Physical Exam:  ICU Vital Signs Last 24 Hrs  T(C): 37.1 (30 Nov 2017 06:00), Max: 37.1 (29 Nov 2017 15:42)  T(F): 98.7 (30 Nov 2017 06:00), Max: 98.8 (29 Nov 2017 15:42)  HR: 73 (30 Nov 2017 06:00) (73 - 81)  BP: 138/78 (30 Nov 2017 06:00) (138/78 - 146/72)  BP(mean): --  ABP: --  ABP(mean): --  RR: 17 (30 Nov 2017 06:00) (17 - 18)  SpO2: 99% (30 Nov 2017 06:00) (99% - 100%)    GEN: NAD, pleasant  HEENT: normocephalic and atraumatic. EOMI. JUNAID...  NECK: Supple. No carotid bruits.  No lymphadenopathy or thyromegaly.  LUNGS: Clear to auscultation.  HEART: Regular rate and rhythm without murmur.  ABDOMEN: Soft, nontender, and nondistended.  Positive bowel sounds.  No hepatosplenomegaly was noted.  NO REBOUND NO GUARDING  EXTREMITIES: RIGHT LEG WRAPPED.  NEUROLOGIC: Cranial nerves II through XII are grossly intact.    SKIN: No ulceration or induration present.      Labs:

## 2017-11-30 NOTE — PROGRESS NOTE ADULT - ASSESSMENT
66y/o male with hx. of chronic RLE venous stasis ulcer for past 3 years who was being followed by private Vascular surgeon until earlier this year presents to ED by EMS after wound started bleeding spontaneously. Patient states he is no longer following with Dr. Aguirre, because they dont take his insurance. He has been using medihoney and dry dressing at home. Denies any recent wound drainage or foul smell. No fever, chills, N/V, SOB, CP. He states he has had vascular studies in past showing sufficient blood flow to lower leg. Patient was taking a shower after he saw the bleeding and then had a syncopal episode witnessed by his wife. She denies any seizure like activity. No hx of CAD/CVA. Patient in ED with non-diagnostic CVA w/u. Labs at baseline. No focal weakness. EKG with no acute changes . He will be admitted for Vascular surgery/wound care eval and syncope w/u.    NO FEVERS NO CHILLS    MRI NO OSTEO  DEFER ABX  WILL FOLLOW UP AS NEEDED   PLEASE CALL IF Questions

## 2017-12-01 LAB
GLUCOSE BLDC GLUCOMTR-MCNC: 123 MG/DL — HIGH (ref 70–99)
GLUCOSE BLDC GLUCOMTR-MCNC: 201 MG/DL — HIGH (ref 70–99)
GLUCOSE BLDC GLUCOMTR-MCNC: 306 MG/DL — HIGH (ref 70–99)
GLUCOSE BLDC GLUCOMTR-MCNC: 83 MG/DL — SIGNIFICANT CHANGE UP (ref 70–99)

## 2017-12-01 PROCEDURE — 99232 SBSQ HOSP IP/OBS MODERATE 35: CPT

## 2017-12-01 RX ADMIN — TAMSULOSIN HYDROCHLORIDE 0.4 MILLIGRAM(S): 0.4 CAPSULE ORAL at 21:39

## 2017-12-01 RX ADMIN — SODIUM CHLORIDE 3 MILLILITER(S): 9 INJECTION INTRAMUSCULAR; INTRAVENOUS; SUBCUTANEOUS at 16:46

## 2017-12-01 RX ADMIN — SODIUM CHLORIDE 3 MILLILITER(S): 9 INJECTION INTRAMUSCULAR; INTRAVENOUS; SUBCUTANEOUS at 06:34

## 2017-12-01 RX ADMIN — LOSARTAN POTASSIUM 50 MILLIGRAM(S): 100 TABLET, FILM COATED ORAL at 06:34

## 2017-12-01 RX ADMIN — Medication 50 MILLIGRAM(S): at 06:34

## 2017-12-01 RX ADMIN — Medication: at 22:54

## 2017-12-01 NOTE — PROGRESS NOTE ADULT - ASSESSMENT
This is 68 y/o man with PMH of HTN, CKD, venous ulcer admitted for syncope and episode of bleeding from RLE, venous stasis ulcer. Pt is seen by cardiology. s/p stress test negative, getting wound care for venous statis, will need debridement, likely on Monday     A/P    > Venous stasis ulcer of right calf with other ulcer severity, unspecified whether varicose veins present.   Vascular/Wound care consult noted. Silvadene cream, wet dressing for now. Avoid chemical DVT-P with recent bleeding.  MRI: no evidence of O.M.  For debridement by plastic surgery dr gupta. rescheduled on Monday.    sp stress test, pt is cardiac and medically cleared for right lower extremity debridement.     > Syncope and collapse  appreciate cardiology input, discussed with Dr. Tan,  Likely vasovagal as it happened when patient came out of shower,   sp echo/carotid US wnl.       >Essential hypertension.   Plan: DASH diet,  restarted metoprolol losartan 50mg     >Glucose intolerance (impaired glucose tolerance).   Plan: Serum glucose elevated on chemistry, ADA diet, Accuchecks AC/HS, HISS,  A!C 5.6.     > Chronic anemia -  stable  f/u cbc     >CKD (chronic kidney disease) stage 2, GFR 60-89 ml/min.   Plan: Stable, avoid nephrotoxins,    >DVT PPX- SCD

## 2017-12-01 NOTE — PROGRESS NOTE ADULT - SUBJECTIVE AND OBJECTIVE BOX
Internal Medicine Hospitalist - Dr. Ronnie DIALLO    [id number]    67y      Male    Patient is a 67y old  Male who presents with a chief complaint of Right leg bleeding (27 Nov 2017 06:07)    INTERVAL HPI/ OVERNIGHT EVENTS: Patient is seen and examined, no new event overnight.     PHYSICAL EXAM:    Vital Signs Last 24 Hrs  T(C): 36.6 (30 Nov 2017 22:52), Max: 37.2 (30 Nov 2017 21:08)  T(F): 97.9 (30 Nov 2017 22:52), Max: 98.9 (30 Nov 2017 21:08)  HR: 62 (30 Nov 2017 22:52) (62 - 98)  BP: 152/77 (30 Nov 2017 22:52) (120/56 - 152/77)  BP(mean): --  RR: 18 (30 Nov 2017 22:52) (18 - 18)  SpO2: 99% (30 Nov 2017 22:52) (99% - 100%)    GENERAL: NAD  CHEST/LUNG: positive air entry   HEART: S1S2+ audible  ABDOMEN: Soft, Nontender, Nondistended; Bowel sounds present  EXTREMITIES:  no edema  CNS: AAO X 3    LABS:                        8.5    5.0   )-----------( 251      ( 30 Nov 2017 05:35 )             26.0     11-30    144  |  104  |  14.0  ----------------------------<  113  4.0   |  27.0  |  0.91    Ca    9.0      30 Nov 2017 05:35      MEDICATIONS  (STANDING):  Dakins Solution - 1/2 Strength 1 Application(s) Topical daily  dextrose 5%. 1000 milliLiter(s) (50 mL/Hr) IV Continuous <Continuous>  dextrose 50% Injectable 12.5 Gram(s) IV Push once  dextrose 50% Injectable 25 Gram(s) IV Push once  dextrose 50% Injectable 25 Gram(s) IV Push once  insulin lispro (HumaLOG) corrective regimen sliding scale   SubCutaneous Before meals and at bedtime  losartan 50 milliGRAM(s) Oral daily  metoprolol succinate ER 50 milliGRAM(s) Oral daily  silver sulfADIAZINE 1% Cream 1 Application(s) Topical daily  sodium chloride 0.9% lock flush 3 milliLiter(s) IV Push every 8 hours  tamsulosin 0.4 milliGRAM(s) Oral at bedtime    MEDICATIONS  (PRN):  dextrose Gel 1 Dose(s) Oral once PRN Blood Glucose LESS THAN 70 milliGRAM(s)/deciliter  glucagon  Injectable 1 milliGRAM(s) IntraMuscular once PRN Glucose LESS THAN 70 milligrams/deciliter  ondansetron Injectable 4 milliGRAM(s) IV Push every 6 hours PRN Nausea      RADIOLOGY & ADDITIONAL TEST Internal Medicine Hospitalist - Dr. Ronnie DIALLO    [id number]    67y      Male    Patient is a 67y old  Male who presents with a chief complaint of Right leg bleeding (27 Nov 2017 06:07)    INTERVAL HPI/ OVERNIGHT EVENTS: Patient is seen and examined, no new event overnight.     PHYSICAL EXAM:    Vital Signs Last 24 Hrs  T(C): 36.6 (30 Nov 2017 22:52), Max: 37.2 (30 Nov 2017 21:08)  T(F): 97.9 (30 Nov 2017 22:52), Max: 98.9 (30 Nov 2017 21:08)  HR: 62 (30 Nov 2017 22:52) (62 - 98)  BP: 152/77 (30 Nov 2017 22:52) (120/56 - 152/77)  BP(mean): --  RR: 18 (30 Nov 2017 22:52) (18 - 18)  SpO2: 99% (30 Nov 2017 22:52) (99% - 100%)    GENERAL: NAD  CHEST/LUNG: positive air entry  S1S2+ audible  ABDOMEN: Soft, Nontender, Nondistended; Bowel sounds present  EXTREMITIES: right leg dressing     LABS:                        8.5    5.0   )-----------( 251      ( 30 Nov 2017 05:35 )             26.0     11-30    144  |  104  |  14.0  ----------------------------<  113  4.0   |  27.0  |  0.91    Ca    9.0      30 Nov 2017 05:35      MEDICATIONS  (STANDING):  Dakins Solution - 1/2 Strength 1 Application(s) Topical daily  dextrose 5%. 1000 milliLiter(s) (50 mL/Hr) IV Continuous <Continuous>  dextrose 50% Injectable 12.5 Gram(s) IV Push once  dextrose 50% Injectable 25 Gram(s) IV Push once  dextrose 50% Injectable 25 Gram(s) IV Push once  insulin lispro (HumaLOG) corrective regimen sliding scale   SubCutaneous Before meals and at bedtime  losartan 50 milliGRAM(s) Oral daily  metoprolol succinate ER 50 milliGRAM(s) Oral daily  silver sulfADIAZINE 1% Cream 1 Application(s) Topical daily  sodium chloride 0.9% lock flush 3 milliLiter(s) IV Push every 8 hours  tamsulosin 0.4 milliGRAM(s) Oral at bedtime    MEDICATIONS  (PRN):  dextrose Gel 1 Dose(s) Oral once PRN Blood Glucose LESS THAN 70 milliGRAM(s)/deciliter  glucagon  Injectable 1 milliGRAM(s) IntraMuscular once PRN Glucose LESS THAN 70 milligrams/deciliter  ondansetron Injectable 4 milliGRAM(s) IV Push every 6 hours PRN Nausea      RADIOLOGY & ADDITIONAL TEST

## 2017-12-02 LAB
ANION GAP SERPL CALC-SCNC: 12 MMOL/L — SIGNIFICANT CHANGE UP (ref 5–17)
BUN SERPL-MCNC: 16 MG/DL — SIGNIFICANT CHANGE UP (ref 8–20)
CALCIUM SERPL-MCNC: 8.9 MG/DL — SIGNIFICANT CHANGE UP (ref 8.6–10.2)
CHLORIDE SERPL-SCNC: 101 MMOL/L — SIGNIFICANT CHANGE UP (ref 98–107)
CO2 SERPL-SCNC: 27 MMOL/L — SIGNIFICANT CHANGE UP (ref 22–29)
CREAT SERPL-MCNC: 1.05 MG/DL — SIGNIFICANT CHANGE UP (ref 0.5–1.3)
CULTURE RESULTS: SIGNIFICANT CHANGE UP
CULTURE RESULTS: SIGNIFICANT CHANGE UP
GLUCOSE BLDC GLUCOMTR-MCNC: 123 MG/DL — HIGH (ref 70–99)
GLUCOSE BLDC GLUCOMTR-MCNC: 127 MG/DL — HIGH (ref 70–99)
GLUCOSE BLDC GLUCOMTR-MCNC: 152 MG/DL — HIGH (ref 70–99)
GLUCOSE BLDC GLUCOMTR-MCNC: 98 MG/DL — SIGNIFICANT CHANGE UP (ref 70–99)
GLUCOSE SERPL-MCNC: 121 MG/DL — HIGH (ref 70–115)
HCT VFR BLD CALC: 28.3 % — LOW (ref 42–52)
HGB BLD-MCNC: 8.9 G/DL — LOW (ref 14–18)
MCHC RBC-ENTMCNC: 26.2 PG — LOW (ref 27–31)
MCHC RBC-ENTMCNC: 31.4 G/DL — LOW (ref 32–36)
MCV RBC AUTO: 83.2 FL — SIGNIFICANT CHANGE UP (ref 80–94)
PLATELET # BLD AUTO: 340 K/UL — SIGNIFICANT CHANGE UP (ref 150–400)
POTASSIUM SERPL-MCNC: 4.2 MMOL/L — SIGNIFICANT CHANGE UP (ref 3.5–5.3)
POTASSIUM SERPL-SCNC: 4.2 MMOL/L — SIGNIFICANT CHANGE UP (ref 3.5–5.3)
RBC # BLD: 3.4 M/UL — LOW (ref 4.6–6.2)
RBC # FLD: 13.3 % — SIGNIFICANT CHANGE UP (ref 11–15.6)
SODIUM SERPL-SCNC: 140 MMOL/L — SIGNIFICANT CHANGE UP (ref 135–145)
SPECIMEN SOURCE: SIGNIFICANT CHANGE UP
SPECIMEN SOURCE: SIGNIFICANT CHANGE UP
WBC # BLD: 5.7 K/UL — SIGNIFICANT CHANGE UP (ref 4.8–10.8)
WBC # FLD AUTO: 5.7 K/UL — SIGNIFICANT CHANGE UP (ref 4.8–10.8)

## 2017-12-02 PROCEDURE — 99232 SBSQ HOSP IP/OBS MODERATE 35: CPT

## 2017-12-02 RX ADMIN — TAMSULOSIN HYDROCHLORIDE 0.4 MILLIGRAM(S): 0.4 CAPSULE ORAL at 22:32

## 2017-12-02 RX ADMIN — SODIUM CHLORIDE 3 MILLILITER(S): 9 INJECTION INTRAMUSCULAR; INTRAVENOUS; SUBCUTANEOUS at 22:49

## 2017-12-02 RX ADMIN — LOSARTAN POTASSIUM 50 MILLIGRAM(S): 100 TABLET, FILM COATED ORAL at 06:06

## 2017-12-02 RX ADMIN — SODIUM CHLORIDE 3 MILLILITER(S): 9 INJECTION INTRAMUSCULAR; INTRAVENOUS; SUBCUTANEOUS at 14:19

## 2017-12-02 RX ADMIN — Medication 50 MILLIGRAM(S): at 06:06

## 2017-12-02 RX ADMIN — Medication 1 APPLICATION(S): at 14:19

## 2017-12-02 RX ADMIN — SODIUM CHLORIDE 3 MILLILITER(S): 9 INJECTION INTRAMUSCULAR; INTRAVENOUS; SUBCUTANEOUS at 06:07

## 2017-12-02 NOTE — PROGRESS NOTE ADULT - SUBJECTIVE AND OBJECTIVE BOX
Internal Medicine Hospitalist - Dr. Ronnie DIALLO    [id number]    67y      Male    Patient is a 67y old  Male who presents with a chief complaint of Right leg bleeding (27 Nov 2017 06:07)    INTERVAL HPI/ OVERNIGHT EVENTS: Patient is seen and examined, no new event overnight.     REVIEW OF SYSTEMS:    Denied fever, chills, abd. pain, nausea, vomiting, chest pain, SOB, headache, dizziness    PHYSICAL EXAM:    Vital Signs Last 24 Hrs  T(C): 36.7 (02 Dec 2017 07:58), Max: 36.8 (01 Dec 2017 10:27)  T(F): 98.1 (02 Dec 2017 07:58), Max: 98.2 (01 Dec 2017 10:27)  HR: 60 (02 Dec 2017 07:58) (60 - 71)  BP: 134/73 (02 Dec 2017 07:58) (119/67 - 134/73)  BP(mean): --  RR: 18 (02 Dec 2017 07:58) (18 - 19)  SpO2: 98% (02 Dec 2017 07:58) (97% - 98%)    GENERAL: NAD  CHEST/LUNG: Clear to percussion bilaterally; No wheezing: S1S2+ audible  ABDOMEN: Soft, Nontender, Nondistended; Bowel sounds present  EXTREMITIES:  right leg dressing       LABS: Pending       MEDICATIONS  (STANDING):  Dakins Solution - 1/2 Strength 1 Application(s) Topical daily  dextrose 5%. 1000 milliLiter(s) (50 mL/Hr) IV Continuous <Continuous>  dextrose 50% Injectable 12.5 Gram(s) IV Push once  dextrose 50% Injectable 25 Gram(s) IV Push once  dextrose 50% Injectable 25 Gram(s) IV Push once  insulin lispro (HumaLOG) corrective regimen sliding scale   SubCutaneous Before meals and at bedtime  losartan 50 milliGRAM(s) Oral daily  metoprolol succinate ER 50 milliGRAM(s) Oral daily  silver sulfADIAZINE 1% Cream 1 Application(s) Topical daily  sodium chloride 0.9% lock flush 3 milliLiter(s) IV Push every 8 hours  tamsulosin 0.4 milliGRAM(s) Oral at bedtime    MEDICATIONS  (PRN):  dextrose Gel 1 Dose(s) Oral once PRN Blood Glucose LESS THAN 70 milliGRAM(s)/deciliter  glucagon  Injectable 1 milliGRAM(s) IntraMuscular once PRN Glucose LESS THAN 70 milligrams/deciliter  ondansetron Injectable 4 milliGRAM(s) IV Push every 6 hours PRN Nausea      RADIOLOGY & ADDITIONAL TEST

## 2017-12-02 NOTE — PROGRESS NOTE ADULT - ASSESSMENT
This is 66 y/o man with PMH of HTN, CKD, venous ulcer admitted for syncope and episode of bleeding from RLE, venous stasis ulcer. Pt is seen by cardiology. s/p stress test negative, getting wound care for venous statis, will need debridement, likely on Monday     A/P    > Venous stasis ulcer of right calf - schedule for debridement on Monday   Vascular/Wound care consult noted. Silvadene cream, wet dressing for now. Avoid chemical DVT-P with recent bleeding.  MRI: no evidence of O.M.  For debridement by plastic surgery dr gupta. rescheduled on Monday.    sp stress test, pt is cardiac and medically cleared for right lower extremity debridement.     > Syncope and collapse  appreciate cardiology input, discussed with Dr. Tan,  Likely vasovagal as it happened when patient came out of shower, no further work up needed   sp echo/carotid US wnl.       >Essential hypertension. - normotensive   Plan: DASH diet,  c/w metoprolol losartan 50mg     >Glucose intolerance (impaired glucose tolerance).   Plan: Serum glucose elevated on chemistry, ADA diet, Accuchecks AC/HS, HISS,  A!C 5.6.     > Chronic anemia -   f/u cbc today     >CKD (chronic kidney disease) stage 2, GFR 60-89 ml/min.   Plan: Stable, avoid nephrotoxins,    >DVT PPX- SCD

## 2017-12-03 LAB
GLUCOSE BLDC GLUCOMTR-MCNC: 129 MG/DL — HIGH (ref 70–99)
GLUCOSE BLDC GLUCOMTR-MCNC: 137 MG/DL — HIGH (ref 70–99)
GLUCOSE BLDC GLUCOMTR-MCNC: 148 MG/DL — HIGH (ref 70–99)
GLUCOSE BLDC GLUCOMTR-MCNC: 182 MG/DL — HIGH (ref 70–99)

## 2017-12-03 PROCEDURE — 99232 SBSQ HOSP IP/OBS MODERATE 35: CPT

## 2017-12-03 RX ADMIN — SODIUM CHLORIDE 3 MILLILITER(S): 9 INJECTION INTRAMUSCULAR; INTRAVENOUS; SUBCUTANEOUS at 06:11

## 2017-12-03 RX ADMIN — Medication 1 APPLICATION(S): at 13:06

## 2017-12-03 RX ADMIN — SODIUM CHLORIDE 3 MILLILITER(S): 9 INJECTION INTRAMUSCULAR; INTRAVENOUS; SUBCUTANEOUS at 22:12

## 2017-12-03 RX ADMIN — SODIUM CHLORIDE 3 MILLILITER(S): 9 INJECTION INTRAMUSCULAR; INTRAVENOUS; SUBCUTANEOUS at 13:06

## 2017-12-03 RX ADMIN — Medication 50 MILLIGRAM(S): at 06:08

## 2017-12-03 RX ADMIN — LOSARTAN POTASSIUM 50 MILLIGRAM(S): 100 TABLET, FILM COATED ORAL at 06:08

## 2017-12-03 RX ADMIN — TAMSULOSIN HYDROCHLORIDE 0.4 MILLIGRAM(S): 0.4 CAPSULE ORAL at 21:22

## 2017-12-03 NOTE — PROGRESS NOTE ADULT - ASSESSMENT
This is 68 y/o man with PMH of HTN, CKD, venous ulcer admitted for syncope and episode of bleeding from RLE, venous stasis ulcer. Pt is seen by cardiology. s/p stress test negative, getting wound care for venous statis, schedule for debridement on Monday     A/P    > Venous stasis ulcer of right calf - schedule for debridement on Monday   Vascular/Wound care consult noted. Silvadene cream, wet dressing for now. Avoid chemical DVT-P with recent bleeding.  MRI: no evidence of O.M.  For debridement by plastic surgery dr gupta. rescheduled on Monday.    sp stress test, pt is cardiac and medically cleared for right lower extremity debridement.     > Syncope and collapse  appreciate cardiology input, discussed with Dr. Tan,  Likely vasovagal as it happened when patient came out of shower, no further work up needed   sp echo/carotid US wnl.       >Essential hypertension. - normotensive   Plan: DASH diet,  c/w metoprolol losartan 50mg     >Glucose intolerance (impaired glucose tolerance).   Plan: Serum glucose elevated on chemistry, ADA diet, Accuchecks AC/HS, HISS,  A!C 5.6.     > Chronic anemia -   f/u cbc today     >CKD (chronic kidney disease) stage 2, GFR 60-89 ml/min.   Plan: Stable, avoid nephrotoxins,    >DVT PPX- SCD

## 2017-12-03 NOTE — PROGRESS NOTE ADULT - SUBJECTIVE AND OBJECTIVE BOX
Internal Medicine Hospitalist - Dr. Ronnie DIALLO    [id number]    67y      Male    Patient is a 67y old  Male who presents with a chief complaint of Right leg bleeding (27 Nov 2017 06:07)    INTERVAL HPI/ OVERNIGHT EVENTS: Patient is seen and examined, no new event overnight     REVIEW OF SYSTEMS:    Denied fever, chills, abd. pain, nausea, vomiting, chest pain, SOB, headache, dizziness    PHYSICAL EXAM:    Vital Signs Last 24 Hrs  T(C): 37.2 (03 Dec 2017 07:48), Max: 37.2 (03 Dec 2017 07:48)  T(F): 99 (03 Dec 2017 07:48), Max: 99 (03 Dec 2017 07:48)  HR: 67 (03 Dec 2017 07:48) (60 - 74)  BP: 110/60 (03 Dec 2017 07:48) (110/60 - 123/69)  BP(mean): --  RR: 19 (03 Dec 2017 07:48) (18 - 19)  SpO2: 98% (02 Dec 2017 23:38) (98% - 98%)    GENERAL: NAD  CHEST/LUNG: positive air entry S1S2+ audible  ABDOMEN: Soft, Nontender, Nondistended; Bowel sounds present  EXTREMITIES:  right leg dressing       LABS:                        8.9    5.7   )-----------( 340      ( 02 Dec 2017 08:37 )             28.3     12-02    140  |  101  |  16.0  ----------------------------<  121<H>  4.2   |  27.0  |  1.05    Ca    8.9      02 Dec 2017 08:37              MEDICATIONS  (STANDING):  Dakins Solution - 1/2 Strength 1 Application(s) Topical daily  dextrose 5%. 1000 milliLiter(s) (50 mL/Hr) IV Continuous <Continuous>  dextrose 50% Injectable 12.5 Gram(s) IV Push once  dextrose 50% Injectable 25 Gram(s) IV Push once  dextrose 50% Injectable 25 Gram(s) IV Push once  insulin lispro (HumaLOG) corrective regimen sliding scale   SubCutaneous Before meals and at bedtime  losartan 50 milliGRAM(s) Oral daily  metoprolol succinate ER 50 milliGRAM(s) Oral daily  silver sulfADIAZINE 1% Cream 1 Application(s) Topical daily  sodium chloride 0.9% lock flush 3 milliLiter(s) IV Push every 8 hours  tamsulosin 0.4 milliGRAM(s) Oral at bedtime    MEDICATIONS  (PRN):  dextrose Gel 1 Dose(s) Oral once PRN Blood Glucose LESS THAN 70 milliGRAM(s)/deciliter  glucagon  Injectable 1 milliGRAM(s) IntraMuscular once PRN Glucose LESS THAN 70 milligrams/deciliter  ondansetron Injectable 4 milliGRAM(s) IV Push every 6 hours PRN Nausea      RADIOLOGY & ADDITIONAL TEST

## 2017-12-04 ENCOUNTER — RESULT REVIEW (OUTPATIENT)
Age: 67
End: 2017-12-04

## 2017-12-04 LAB
ANION GAP SERPL CALC-SCNC: 12 MMOL/L — SIGNIFICANT CHANGE UP (ref 5–17)
APTT BLD: 32.4 SEC — SIGNIFICANT CHANGE UP (ref 27.5–37.4)
BLD GP AB SCN SERPL QL: SIGNIFICANT CHANGE UP
BUN SERPL-MCNC: 25 MG/DL — HIGH (ref 8–20)
CALCIUM SERPL-MCNC: 8.8 MG/DL — SIGNIFICANT CHANGE UP (ref 8.6–10.2)
CHLORIDE SERPL-SCNC: 101 MMOL/L — SIGNIFICANT CHANGE UP (ref 98–107)
CO2 SERPL-SCNC: 26 MMOL/L — SIGNIFICANT CHANGE UP (ref 22–29)
CREAT SERPL-MCNC: 1.44 MG/DL — HIGH (ref 0.5–1.3)
GLUCOSE BLDC GLUCOMTR-MCNC: 130 MG/DL — HIGH (ref 70–99)
GLUCOSE BLDC GLUCOMTR-MCNC: 139 MG/DL — HIGH (ref 70–99)
GLUCOSE SERPL-MCNC: 128 MG/DL — HIGH (ref 70–115)
HCT VFR BLD CALC: 27 % — LOW (ref 42–52)
HGB BLD-MCNC: 8.7 G/DL — LOW (ref 14–18)
INR BLD: 1.2 RATIO — HIGH (ref 0.88–1.16)
MCHC RBC-ENTMCNC: 26.8 PG — LOW (ref 27–31)
MCHC RBC-ENTMCNC: 32.2 G/DL — SIGNIFICANT CHANGE UP (ref 32–36)
MCV RBC AUTO: 83.1 FL — SIGNIFICANT CHANGE UP (ref 80–94)
PLATELET # BLD AUTO: 321 K/UL — SIGNIFICANT CHANGE UP (ref 150–400)
POTASSIUM SERPL-MCNC: 4.6 MMOL/L — SIGNIFICANT CHANGE UP (ref 3.5–5.3)
POTASSIUM SERPL-SCNC: 4.6 MMOL/L — SIGNIFICANT CHANGE UP (ref 3.5–5.3)
PROTHROM AB SERPL-ACNC: 13.3 SEC — HIGH (ref 9.8–12.7)
RBC # BLD: 3.25 M/UL — LOW (ref 4.6–6.2)
RBC # FLD: 13.5 % — SIGNIFICANT CHANGE UP (ref 11–15.6)
SODIUM SERPL-SCNC: 139 MMOL/L — SIGNIFICANT CHANGE UP (ref 135–145)
TYPE + AB SCN PNL BLD: SIGNIFICANT CHANGE UP
WBC # BLD: 6 K/UL — SIGNIFICANT CHANGE UP (ref 4.8–10.8)
WBC # FLD AUTO: 6 K/UL — SIGNIFICANT CHANGE UP (ref 4.8–10.8)

## 2017-12-04 PROCEDURE — 88304 TISSUE EXAM BY PATHOLOGIST: CPT | Mod: 26

## 2017-12-04 PROCEDURE — 99232 SBSQ HOSP IP/OBS MODERATE 35: CPT

## 2017-12-04 RX ORDER — SODIUM CHLORIDE 9 MG/ML
1000 INJECTION, SOLUTION INTRAVENOUS
Qty: 0 | Refills: 0 | Status: DISCONTINUED | OUTPATIENT
Start: 2017-12-04 | End: 2017-12-04

## 2017-12-04 RX ORDER — GLUCAGON INJECTION, SOLUTION 0.5 MG/.1ML
1 INJECTION, SOLUTION SUBCUTANEOUS ONCE
Qty: 0 | Refills: 0 | Status: DISCONTINUED | OUTPATIENT
Start: 2017-12-04 | End: 2017-12-12

## 2017-12-04 RX ORDER — ONDANSETRON 8 MG/1
4 TABLET, FILM COATED ORAL ONCE
Qty: 0 | Refills: 0 | Status: DISCONTINUED | OUTPATIENT
Start: 2017-12-04 | End: 2017-12-04

## 2017-12-04 RX ORDER — METOPROLOL TARTRATE 50 MG
50 TABLET ORAL DAILY
Qty: 0 | Refills: 0 | Status: DISCONTINUED | OUTPATIENT
Start: 2017-12-04 | End: 2017-12-12

## 2017-12-04 RX ORDER — ONDANSETRON 8 MG/1
4 TABLET, FILM COATED ORAL EVERY 6 HOURS
Qty: 0 | Refills: 0 | Status: DISCONTINUED | OUTPATIENT
Start: 2017-12-04 | End: 2017-12-12

## 2017-12-04 RX ORDER — DEXTROSE 50 % IN WATER 50 %
12.5 SYRINGE (ML) INTRAVENOUS ONCE
Qty: 0 | Refills: 0 | Status: DISCONTINUED | OUTPATIENT
Start: 2017-12-04 | End: 2017-12-12

## 2017-12-04 RX ORDER — TAMSULOSIN HYDROCHLORIDE 0.4 MG/1
0.4 CAPSULE ORAL AT BEDTIME
Qty: 0 | Refills: 0 | Status: DISCONTINUED | OUTPATIENT
Start: 2017-12-04 | End: 2017-12-12

## 2017-12-04 RX ORDER — DEXTROSE 50 % IN WATER 50 %
1 SYRINGE (ML) INTRAVENOUS ONCE
Qty: 0 | Refills: 0 | Status: DISCONTINUED | OUTPATIENT
Start: 2017-12-04 | End: 2017-12-12

## 2017-12-04 RX ORDER — INSULIN LISPRO 100/ML
VIAL (ML) SUBCUTANEOUS
Qty: 0 | Refills: 0 | Status: DISCONTINUED | OUTPATIENT
Start: 2017-12-04 | End: 2017-12-12

## 2017-12-04 RX ORDER — FENTANYL CITRATE 50 UG/ML
50 INJECTION INTRAVENOUS
Qty: 0 | Refills: 0 | Status: DISCONTINUED | OUTPATIENT
Start: 2017-12-04 | End: 2017-12-04

## 2017-12-04 RX ORDER — DEXTROSE 50 % IN WATER 50 %
25 SYRINGE (ML) INTRAVENOUS ONCE
Qty: 0 | Refills: 0 | Status: DISCONTINUED | OUTPATIENT
Start: 2017-12-04 | End: 2017-12-12

## 2017-12-04 RX ORDER — FENTANYL CITRATE 50 UG/ML
25 INJECTION INTRAVENOUS
Qty: 0 | Refills: 0 | Status: DISCONTINUED | OUTPATIENT
Start: 2017-12-04 | End: 2017-12-04

## 2017-12-04 RX ADMIN — SODIUM CHLORIDE 75 MILLILITER(S): 9 INJECTION, SOLUTION INTRAVENOUS at 13:01

## 2017-12-04 RX ADMIN — TAMSULOSIN HYDROCHLORIDE 0.4 MILLIGRAM(S): 0.4 CAPSULE ORAL at 23:40

## 2017-12-04 RX ADMIN — SODIUM CHLORIDE 3 MILLILITER(S): 9 INJECTION INTRAMUSCULAR; INTRAVENOUS; SUBCUTANEOUS at 08:22

## 2017-12-04 NOTE — BRIEF OPERATIVE NOTE - PROCEDURE
<<-----Click on this checkbox to enter Procedure Debridement and application of dressing  12/04/2017    Active  BPINSKY

## 2017-12-04 NOTE — PROGRESS NOTE ADULT - SUBJECTIVE AND OBJECTIVE BOX
Pt is now hospital day 6.  Feeling better.  No further syncopal episodes.    AVSS  MRI shows no osteo  RLE with decr edema.  Wound remains open with necrotic tissue present.      A/P:  Pt for debridement today  Plan for multiple bx to r/o barbaralin;s  Ulcer may involve tibia, possible need to coni outer cortex and place integra w/ VAC

## 2017-12-04 NOTE — PROGRESS NOTE ADULT - SUBJECTIVE AND OBJECTIVE BOX
Internal Medicine Hospitalist - Dr. Ronnie DIALLO    [id number]    67y      Male    Patient is a 67y old  Male who presents with a chief complaint of Right leg bleeding (27 Nov 2017 06:07)    INTERVAL HPI/ OVERNIGHT EVENTS: Patient is seen and examined, no new event overnight, schedule for debridement today     REVIEW OF SYSTEMS:    Denied fever, chills, abd. pain, nausea, vomiting, chest pain, SOB, headache, dizziness    PHYSICAL EXAM:    Vital Signs Last 24 Hrs  T(C): 37.4 (03 Dec 2017 23:45), Max: 37.4 (03 Dec 2017 23:45)  T(F): 99.3 (03 Dec 2017 23:45), Max: 99.3 (03 Dec 2017 23:45)  HR: 71 (03 Dec 2017 23:45) (67 - 71)  BP: 110/58 (03 Dec 2017 23:45) (110/58 - 124/69)  BP(mean): --  RR: 18 (03 Dec 2017 23:45) (18 - 18)  SpO2: 98% (03 Dec 2017 23:45) (96% - 98%)    GENERAL: NAD  CHEST/LUNG: Clear to percussion bilaterally; No wheezing  S1S2+ audible  ABDOMEN: Soft, Nontender, Nondistended; Bowel sounds present  EXTREMITIES:  dressing over right leg       LABS:                        8.7    6.0   )-----------( 321      ( 04 Dec 2017 08:24 )             27.0     12-04    139  |  101  |  25.0<H>  ----------------------------<  128<H>  4.6   |  26.0  |  1.44<H>    Ca    8.8      04 Dec 2017 08:23      PT/INR - ( 04 Dec 2017 08:23 )   PT: 13.3 sec;   INR: 1.20 ratio         PTT - ( 04 Dec 2017 08:23 )  PTT:32.4 sec        MEDICATIONS  (STANDING):  Dakins Solution - 1/2 Strength 1 Application(s) Topical daily  dextrose 5%. 1000 milliLiter(s) (50 mL/Hr) IV Continuous <Continuous>  dextrose 50% Injectable 12.5 Gram(s) IV Push once  dextrose 50% Injectable 25 Gram(s) IV Push once  dextrose 50% Injectable 25 Gram(s) IV Push once  insulin lispro (HumaLOG) corrective regimen sliding scale   SubCutaneous Before meals and at bedtime  losartan 50 milliGRAM(s) Oral daily  metoprolol succinate ER 50 milliGRAM(s) Oral daily  silver sulfADIAZINE 1% Cream 1 Application(s) Topical daily  sodium chloride 0.9% lock flush 3 milliLiter(s) IV Push every 8 hours  tamsulosin 0.4 milliGRAM(s) Oral at bedtime    MEDICATIONS  (PRN):  dextrose Gel 1 Dose(s) Oral once PRN Blood Glucose LESS THAN 70 milliGRAM(s)/deciliter  glucagon  Injectable 1 milliGRAM(s) IntraMuscular once PRN Glucose LESS THAN 70 milligrams/deciliter  ondansetron Injectable 4 milliGRAM(s) IV Push every 6 hours PRN Nausea      RADIOLOGY & ADDITIONAL TEST

## 2017-12-04 NOTE — PROGRESS NOTE ADULT - ASSESSMENT
This is 66 y/o man with PMH of HTN, CKD, venous ulcer admitted for syncope and episode of bleeding from RLE, venous stasis ulcer. Pt is seen by cardiology. s/p stress test negative, getting wound care for venous statis, schedule for debridement on Monday     A/P    > Venous stasis ulcer of right calf - schedule for debridement today   Vascular/Wound care consult noted. Silvadene cream, wet dressing for now. Avoid chemical DVT-P with recent bleeding.  MRI: no evidence of O.M.  For debridement by plastic surgery dr gupta. rescheduled on Monday.    sp stress test, pt is cardiac and medically cleared for right lower extremity debridement.  NPO, start IVF      > Syncope and collapse  appreciate cardiology input, discussed with Dr. aTn,  Likely vasovagal as it happened when patient came out of shower, no further work up needed   sp echo/carotid US wnl.       >Essential hypertension. - low normal, NPO, also noted to have SCAR, hold losartan   c/w metoprolol    >Glucose intolerance (impaired glucose tolerance).   Plan: Serum glucose elevated on chemistry, ADA diet, Accuchecks AC/HS, HISS,  A!C 5.6.     > Chronic anemia - H&H stable     >CKD (chronic kidney disease) stage 2, GFR 60-89 ml/min.   noted to have mild worsening of renal function, IVF, hold losartan today     >DVT PPX- SCD

## 2017-12-05 ENCOUNTER — TRANSCRIPTION ENCOUNTER (OUTPATIENT)
Age: 67
End: 2017-12-05

## 2017-12-05 LAB
ANION GAP SERPL CALC-SCNC: 13 MMOL/L — SIGNIFICANT CHANGE UP (ref 5–17)
BUN SERPL-MCNC: 27 MG/DL — HIGH (ref 8–20)
CALCIUM SERPL-MCNC: 8.8 MG/DL — SIGNIFICANT CHANGE UP (ref 8.6–10.2)
CHLORIDE SERPL-SCNC: 103 MMOL/L — SIGNIFICANT CHANGE UP (ref 98–107)
CO2 SERPL-SCNC: 23 MMOL/L — SIGNIFICANT CHANGE UP (ref 22–29)
CREAT SERPL-MCNC: 1.38 MG/DL — HIGH (ref 0.5–1.3)
GLUCOSE BLDC GLUCOMTR-MCNC: 115 MG/DL — HIGH (ref 70–99)
GLUCOSE BLDC GLUCOMTR-MCNC: 126 MG/DL — HIGH (ref 70–99)
GLUCOSE BLDC GLUCOMTR-MCNC: 138 MG/DL — HIGH (ref 70–99)
GLUCOSE BLDC GLUCOMTR-MCNC: 173 MG/DL — HIGH (ref 70–99)
GLUCOSE SERPL-MCNC: 140 MG/DL — HIGH (ref 70–115)
HCT VFR BLD CALC: 26.6 % — LOW (ref 42–52)
HGB BLD-MCNC: 8.5 G/DL — LOW (ref 14–18)
MCHC RBC-ENTMCNC: 26.2 PG — LOW (ref 27–31)
MCHC RBC-ENTMCNC: 32 G/DL — SIGNIFICANT CHANGE UP (ref 32–36)
MCV RBC AUTO: 81.8 FL — SIGNIFICANT CHANGE UP (ref 80–94)
PLATELET # BLD AUTO: 320 K/UL — SIGNIFICANT CHANGE UP (ref 150–400)
POTASSIUM SERPL-MCNC: 4.7 MMOL/L — SIGNIFICANT CHANGE UP (ref 3.5–5.3)
POTASSIUM SERPL-SCNC: 4.7 MMOL/L — SIGNIFICANT CHANGE UP (ref 3.5–5.3)
RBC # BLD: 3.25 M/UL — LOW (ref 4.6–6.2)
RBC # FLD: 13.3 % — SIGNIFICANT CHANGE UP (ref 11–15.6)
SODIUM SERPL-SCNC: 139 MMOL/L — SIGNIFICANT CHANGE UP (ref 135–145)
WBC # BLD: 5.5 K/UL — SIGNIFICANT CHANGE UP (ref 4.8–10.8)
WBC # FLD AUTO: 5.5 K/UL — SIGNIFICANT CHANGE UP (ref 4.8–10.8)

## 2017-12-05 PROCEDURE — 99232 SBSQ HOSP IP/OBS MODERATE 35: CPT

## 2017-12-05 RX ORDER — SODIUM CHLORIDE 9 MG/ML
1000 INJECTION, SOLUTION INTRAVENOUS
Qty: 0 | Refills: 0 | Status: DISCONTINUED | OUTPATIENT
Start: 2017-12-05 | End: 2017-12-07

## 2017-12-05 RX ADMIN — TAMSULOSIN HYDROCHLORIDE 0.4 MILLIGRAM(S): 0.4 CAPSULE ORAL at 21:32

## 2017-12-05 RX ADMIN — SODIUM CHLORIDE 75 MILLILITER(S): 9 INJECTION, SOLUTION INTRAVENOUS at 11:51

## 2017-12-05 RX ADMIN — Medication 50 MILLIGRAM(S): at 05:45

## 2017-12-05 RX ADMIN — Medication 2: at 08:30

## 2017-12-05 NOTE — DIETITIAN INITIAL EVALUATION ADULT. - OTHER INFO
Pt seen at bedside. Pt states having good appetite and PO intake currently and PTA. Pt currently on DASH/TLC, Consistent CHO (no snacks) diet with good PO intake. PO intakes noted to be 75%-100%. Pt denies n/v/c/d. Denies difficulty chewing/swallowing. No significant weight change. Nutrition education provided.

## 2017-12-05 NOTE — PROGRESS NOTE ADULT - ASSESSMENT
This is 66 y/o man with PMH of HTN, CKD, venous ulcer admitted for syncope and episode of bleeding from RLE, venous stasis ulcer. Pt is seen by cardiology. s/p stress test negative, getting wound care for venous statis, s/p debridement and application of wound vac for RLE wound on 12/04.     A/P    > Venous stasis ulcer of right calf -   appreciate plastic surgery input- s/p debridement and application of wound vac for RLE wound.  MRI: no evidence of O.M.   Pt and further management as per vascular     > Syncope and collapse  appreciate cardiology input, discussed with Dr. Tan,  Likely vasovagal as it happened when patient came out of shower, no further work up needed   sp echo/carotid US wnl.     >Essential hypertension.  c/w metoprolol, hold losartan for now     >Glucose intolerance (impaired glucose tolerance).   Plan: Serum glucose elevated on chemistry, ADA diet, Accuchecks AC/HS, HISS,  A!C 5.6.     > Chronic anemia - H&H stable     >CKD (chronic kidney disease) stage 2, GFR 60-89 ml/min.   noted to have mild worsening of renal function, IVF, hold losartan today     >DVT PPX- SCD This is 66 y/o man with PMH of HTN, CKD, venous ulcer admitted for syncope and episode of bleeding from RLE, venous stasis ulcer. Pt is seen by cardiology. s/p stress test negative, getting wound care for venous statis, s/p debridement and application of wound vac for RLE wound on 12/04.     A/P    > Venous stasis ulcer of right calf -   appreciate plastic surgery input- s/p debridement and application of wound vac for RLE wound.  MRI: no evidence of O.M.   Pt and further management as per vascular     > Syncope and collapse  appreciate cardiology input, discussed with Dr. Tan,  Likely vasovagal as it happened when patient came out of shower, no further work up needed   sp echo/carotid US wnl.     >Essential hypertension.  c/w metoprolol, hold losartan for now     >Glucose intolerance (impaired glucose tolerance).   Plan: Serum glucose elevated on chemistry, ADA diet, Accuchecks AC/HS, HISS,  A!C 5.6.     > Chronic anemia - H&H stable     >CKD (chronic kidney disease) stage 2, GFR 60-89 ml/min.   noted to have mild worsening of renal function, IVF, hold losartan today     >DVT PPX- SCD  pharmacologic DVt PPX as per plastic surgery

## 2017-12-05 NOTE — PROGRESS NOTE ADULT - SUBJECTIVE AND OBJECTIVE BOX
Internal Medicine Hospitalist - Dr. Ronnie DIALLO    [id number]    67y      Male    Patient is a 67y old  Male who presents with a chief complaint of Right leg bleeding (27 Nov 2017 06:07)    INTERVAL HPI/ OVERNIGHT EVENTS: Patient is seen and examined, s/p debridement yesterday, denied leg pain, chest pain, SOB.     REVIEW OF SYSTEMS:    Denied fever, chills, abd. pain, nausea, vomiting, chest pain, SOB, headache, dizziness    PHYSICAL EXAM:    Vital Signs Last 24 Hrs  T(C): 37.3 (05 Dec 2017 07:52), Max: 37.3 (04 Dec 2017 09:23)  T(F): 99.2 (05 Dec 2017 07:52), Max: 99.2 (05 Dec 2017 07:52)  HR: 64 (05 Dec 2017 07:52) (63 - 69)  BP: 124/62 (05 Dec 2017 07:52) (109/92 - 147/78)  BP(mean): 62 (04 Dec 2017 09:23) (62 - 62)  RR: 18 (05 Dec 2017 07:52) (12 - 18)  SpO2: 98% (04 Dec 2017 23:23) (97% - 100%)    GENERAL: NAD  CHEST/LUNG: positive air entry  S1S2+ audible  ABDOMEN: Soft, Nontender, Nondistended; Bowel sounds present  EXTREMITIES: RLE - wound vac to RLE in place with ace wrap      LABS:                        8.5    5.5   )-----------( 320      ( 05 Dec 2017 07:51 )             26.6     12-05    139  |  103  |  27.0<H>  ----------------------------<  140<H>  4.7   |  23.0  |  1.38<H>    Ca    8.8      05 Dec 2017 07:51      PT/INR - ( 04 Dec 2017 08:23 )   PT: 13.3 sec;   INR: 1.20 ratio         PTT - ( 04 Dec 2017 08:23 )  PTT:32.4 sec        MEDICATIONS  (STANDING):  dextrose 50% Injectable 12.5 Gram(s) IV Push once  dextrose 50% Injectable 25 Gram(s) IV Push once  dextrose 50% Injectable 25 Gram(s) IV Push once  insulin lispro (HumaLOG) corrective regimen sliding scale   SubCutaneous Before meals and at bedtime  metoprolol succinate ER 50 milliGRAM(s) Oral daily  sodium chloride 0.45%. 1000 milliLiter(s) (75 mL/Hr) IV Continuous <Continuous>  tamsulosin 0.4 milliGRAM(s) Oral at bedtime    MEDICATIONS  (PRN):  dextrose Gel 1 Dose(s) Oral once PRN Blood Glucose LESS THAN 70 milliGRAM(s)/deciliter  glucagon  Injectable 1 milliGRAM(s) IntraMuscular once PRN Glucose LESS THAN 70 milligrams/deciliter  ondansetron Injectable 4 milliGRAM(s) IV Push every 6 hours PRN Nausea      RADIOLOGY & ADDITIONAL TEST

## 2017-12-05 NOTE — PROGRESS NOTE ADULT - SUBJECTIVE AND OBJECTIVE BOX
INTERVAL HPI/OVERNIGHT EVENTS/SUBJECTIVE:  67 y M POD 1 debridement and application of wound vac for RLE wound. Reports doing very well since surgery. No complaints at this time. Denies cp, sob, f/c, numbness/tingling.     ICU Vital Signs Last 24 Hrs  T(C): 37.3 (05 Dec 2017 07:52), Max: 37.3 (04 Dec 2017 09:23)  T(F): 99.2 (05 Dec 2017 07:52), Max: 99.2 (05 Dec 2017 07:52)  HR: 64 (05 Dec 2017 07:52) (63 - 69)  BP: 124/62 (05 Dec 2017 07:52) (109/92 - 147/78)  BP(mean): 62 (04 Dec 2017 09:23) (62 - 62)  ABP: --  ABP(mean): --  RR: 18 (05 Dec 2017 07:52) (12 - 18)  SpO2: 98% (04 Dec 2017 23:23) (97% - 100%)      I&O's Detail    04 Dec 2017 07:01  -  05 Dec 2017 07:00  --------------------------------------------------------  IN:  Total IN: 0 mL    OUT:    Voided: 500 mL  Total OUT: 500 mL    Total NET: -500 mL      MEDICATIONS  (STANDING):  dextrose 50% Injectable 12.5 Gram(s) IV Push once  dextrose 50% Injectable 25 Gram(s) IV Push once  dextrose 50% Injectable 25 Gram(s) IV Push once  insulin lispro (HumaLOG) corrective regimen sliding scale   SubCutaneous Before meals and at bedtime  metoprolol succinate ER 50 milliGRAM(s) Oral daily  tamsulosin 0.4 milliGRAM(s) Oral at bedtime    MEDICATIONS  (PRN):  dextrose Gel 1 Dose(s) Oral once PRN Blood Glucose LESS THAN 70 milliGRAM(s)/deciliter  glucagon  Injectable 1 milliGRAM(s) IntraMuscular once PRN Glucose LESS THAN 70 milligrams/deciliter  ondansetron Injectable 4 milliGRAM(s) IV Push every 6 hours PRN Nausea    MISC:     PHYSICAL EXAM:    Gen: NAD, laying comfortably.    Neurological: no loss of sensation ro right toes, moves toes.     Pulmonary: non-labored, no accessory muscle use     Cardiovascular: s1/s2      Extremities: wound vac to RLE in place with ace wrap, good seal.     LABS:  CBC Full  -  ( 05 Dec 2017 07:51 )  WBC Count : 5.5 K/uL  Hemoglobin : 8.5 g/dL  Hematocrit : 26.6 %  Platelet Count - Automated : 320 K/uL  Mean Cell Volume : 81.8 fl  Mean Cell Hemoglobin : 26.2 pg  Mean Cell Hemoglobin Concentration : 32.0 g/dL  Auto Neutrophil # : x  Auto Lymphocyte # : x  Auto Monocyte # : x  Auto Eosinophil # : x  Auto Basophil # : x  Auto Neutrophil % : x  Auto Lymphocyte % : x  Auto Monocyte % : x  Auto Eosinophil % : x  Auto Basophil % : x    12-04    139  |  101  |  25.0<H>  ----------------------------<  128<H>  4.6   |  26.0  |  1.44<H>    Ca    8.8      04 Dec 2017 08:23      PT/INR - ( 04 Dec 2017 08:23 )   PT: 13.3 sec;   INR: 1.20 ratio         PTT - ( 04 Dec 2017 08:23 )  PTT:32.4 sec      ASSESSMENT/PLAN:  67yMale POD 1 debridement of RLE application of wound vac. No acute concerns at present.   -wound vac change by surgery  -care per primary team

## 2017-12-06 LAB
ANION GAP SERPL CALC-SCNC: 13 MMOL/L — SIGNIFICANT CHANGE UP (ref 5–17)
BUN SERPL-MCNC: 25 MG/DL — HIGH (ref 8–20)
CALCIUM SERPL-MCNC: 9 MG/DL — SIGNIFICANT CHANGE UP (ref 8.6–10.2)
CHLORIDE SERPL-SCNC: 103 MMOL/L — SIGNIFICANT CHANGE UP (ref 98–107)
CO2 SERPL-SCNC: 25 MMOL/L — SIGNIFICANT CHANGE UP (ref 22–29)
CREAT SERPL-MCNC: 1.12 MG/DL — SIGNIFICANT CHANGE UP (ref 0.5–1.3)
GLUCOSE BLDC GLUCOMTR-MCNC: 104 MG/DL — HIGH (ref 70–99)
GLUCOSE BLDC GLUCOMTR-MCNC: 110 MG/DL — HIGH (ref 70–99)
GLUCOSE BLDC GLUCOMTR-MCNC: 122 MG/DL — HIGH (ref 70–99)
GLUCOSE BLDC GLUCOMTR-MCNC: 139 MG/DL — HIGH (ref 70–99)
GLUCOSE SERPL-MCNC: 101 MG/DL — SIGNIFICANT CHANGE UP (ref 70–115)
POTASSIUM SERPL-MCNC: 4.5 MMOL/L — SIGNIFICANT CHANGE UP (ref 3.5–5.3)
POTASSIUM SERPL-SCNC: 4.5 MMOL/L — SIGNIFICANT CHANGE UP (ref 3.5–5.3)
SODIUM SERPL-SCNC: 141 MMOL/L — SIGNIFICANT CHANGE UP (ref 135–145)

## 2017-12-06 PROCEDURE — 99232 SBSQ HOSP IP/OBS MODERATE 35: CPT

## 2017-12-06 RX ADMIN — Medication 50 MILLIGRAM(S): at 05:09

## 2017-12-06 RX ADMIN — TAMSULOSIN HYDROCHLORIDE 0.4 MILLIGRAM(S): 0.4 CAPSULE ORAL at 21:25

## 2017-12-06 NOTE — PROGRESS NOTE ADULT - ASSESSMENT
This is 66 y/o man with PMH of HTN, CKD, venous ulcer admitted for syncope and episode of bleeding from RLE, venous stasis ulcer. Pt is seen by cardiology. s/p stress test negative, seen by vascular and plastic surgery, s/p debridement and application of wound vac for RLE wound on 12/04.     A/P    > Venous stasis ulcer of right calf -   appreciate plastic surgery input- s/p debridement and application of wound vac for RLE wound.  MRI: no evidence of O.M.   Pt and further management as per vascular     > Syncope and collapse  appreciate cardiology input, discussed with Dr. Tan,  Likely vasovagal as it happened when patient came out of shower, no further work up needed   sp echo/carotid US wnl.     >Essential hypertension.  c/w metoprolol, hold losartan for now   monitor BP     > Chronic anemia - H&H stable     >CKD (chronic kidney disease) stage 2, GFR 60-89 ml/min.   noted to have mild worsening of renal function, IVF, hold losartan today     >DVT PPX- SCD  pharmacologic DVt PPX as per plastic surgery

## 2017-12-06 NOTE — PROGRESS NOTE ADULT - SUBJECTIVE AND OBJECTIVE BOX
Internal Medicine Hospitalist - Dr. Ronnie DIALLO    [id number]    67y      Male    Patient is a 67y old  Male who presents with a chief complaint of Right leg bleeding (27 Nov 2017 06:07)    INTERVAL HPI/ OVERNIGHT EVENTS: Patient is seen and examined, no new event overnight.     REVIEW OF SYSTEMS:    Denied fever, chills, abd. pain, nausea, vomiting, chest pain, SOB, headache, dizziness    PHYSICAL EXAM:    Vital Signs Last 24 Hrs  T(C): 36.9 (05 Dec 2017 23:44), Max: 37.2 (05 Dec 2017 16:31)  T(F): 98.5 (05 Dec 2017 23:44), Max: 98.9 (05 Dec 2017 16:31)  HR: 70 (05 Dec 2017 23:44) (68 - 70)  BP: 145/78 (05 Dec 2017 23:44) (145/78 - 149/81)  BP(mean): --  RR: 18 (05 Dec 2017 23:44) (18 - 18)  SpO2: 98% (05 Dec 2017 23:44) (98% - 98%)    GENERAL: NAD  CHEST/LUNG: positive air entry  S1S2+ audible  ABDOMEN: Soft, Nontender, Nondistended; Bowel sounds present  EXTREMITIES:  wound vac to RLE in place with ace wrap      LABS:                        8.5    5.5   )-----------( 320      ( 05 Dec 2017 07:51 )             26.6     12-05    139  |  103  |  27.0<H>  ----------------------------<  140<H>  4.7   |  23.0  |  1.38<H>    Ca    8.8      05 Dec 2017 07:51      PT/INR - ( 04 Dec 2017 08:23 )   PT: 13.3 sec;   INR: 1.20 ratio         PTT - ( 04 Dec 2017 08:23 )  PTT:32.4 sec        MEDICATIONS  (STANDING):  dextrose 50% Injectable 12.5 Gram(s) IV Push once  dextrose 50% Injectable 25 Gram(s) IV Push once  dextrose 50% Injectable 25 Gram(s) IV Push once  insulin lispro (HumaLOG) corrective regimen sliding scale   SubCutaneous Before meals and at bedtime  metoprolol succinate ER 50 milliGRAM(s) Oral daily  sodium chloride 0.45%. 1000 milliLiter(s) (75 mL/Hr) IV Continuous <Continuous>  tamsulosin 0.4 milliGRAM(s) Oral at bedtime    MEDICATIONS  (PRN):  dextrose Gel 1 Dose(s) Oral once PRN Blood Glucose LESS THAN 70 milliGRAM(s)/deciliter  glucagon  Injectable 1 milliGRAM(s) IntraMuscular once PRN Glucose LESS THAN 70 milligrams/deciliter  ondansetron Injectable 4 milliGRAM(s) IV Push every 6 hours PRN Nausea      RADIOLOGY & ADDITIONAL TEST

## 2017-12-06 NOTE — PROGRESS NOTE ADULT - SUBJECTIVE AND OBJECTIVE BOX
Pt is POD 2 s/p debridement fo RLE wound.  No new events.    AVSS    VAC in place to RLE.  Foot well perfuse,d minimal edema.  No sign of infection .     A/P:  VAC change tomorrow  Plan for discharge home with VNS and home VAC therapy  Outpatient follow up next week [phone]

## 2017-12-07 LAB
GLUCOSE BLDC GLUCOMTR-MCNC: 103 MG/DL — HIGH (ref 70–99)
GLUCOSE BLDC GLUCOMTR-MCNC: 122 MG/DL — HIGH (ref 70–99)
GLUCOSE BLDC GLUCOMTR-MCNC: 127 MG/DL — HIGH (ref 70–99)
GLUCOSE BLDC GLUCOMTR-MCNC: 143 MG/DL — HIGH (ref 70–99)

## 2017-12-07 PROCEDURE — 99232 SBSQ HOSP IP/OBS MODERATE 35: CPT

## 2017-12-07 RX ADMIN — Medication 50 MILLIGRAM(S): at 05:10

## 2017-12-07 RX ADMIN — TAMSULOSIN HYDROCHLORIDE 0.4 MILLIGRAM(S): 0.4 CAPSULE ORAL at 21:31

## 2017-12-07 NOTE — PROGRESS NOTE ADULT - ASSESSMENT
This is 66 y/o man with PMH of HTN, CKD, venous ulcer admitted for syncope and episode of bleeding from RLE, venous stasis ulcer. Pt is seen by cardiology. s/p stress test negative, seen by vascular and plastic surgery, s/p debridement and application of wound vac for RLE wound on 12/04. wound van will be changed today. Pt eval pending     A/P    > Venous stasis ulcer of right calf -   appreciate plastic surgery input- s/p debridement and application of wound vac for RLE wound.   VAC will be change tomorrow   MRI: no evidence of O.M.   Pt eval pending       > Syncope and collapse  appreciate cardiology input, discussed with Dr. Tan,  Likely vasovagal as it happened when patient came out of shower, no further work up needed   sp echo/carotid US wnl.     >Essential hypertension- normotensive   c/w metoprolol, hold losartan for now   monitor BP     > Chronic anemia - H&H stable     >CKD (chronic kidney disease) stage 2, GFR 60-89 ml/min.- back to baseline     >DVT PPX- SCD  pharmacologic DVt PPX as per plastic surgery     >Disposition - Pt eval pending, VAc will be changed today, possible home with VNA

## 2017-12-07 NOTE — CHART NOTE - NSCHARTNOTEFT_GEN_A_CORE
Wound VAC dressing changed at bedside. Integra mesh in place. Wound without erythema or discharge. No evidence of wound infection. Wound measure approximately 12cm x 14 cm with an irregular rhomboid shape. New wound VAC dressing applied and maintaining suction.

## 2017-12-07 NOTE — PHYSICAL THERAPY INITIAL EVALUATION ADULT - CRITERIA FOR SKILLED THERAPEUTIC INTERVENTIONS
predicted duration of therapy intervention/functional limitations in following categories/anticipated equipment needs at discharge/impairments found/therapy frequency/risk reduction/prevention/rehab potential/anticipated discharge recommendation

## 2017-12-07 NOTE — PHYSICAL THERAPY INITIAL EVALUATION ADULT - PERTINENT HX OF CURRENT PROBLEM, REHAB EVAL
This is 68 y/o man with PMH of HTN, CKD, venous ulcer admitted for syncope and episode of bleeding from RLE, venous stasis ulcer. Pt is seen by cardiology. s/p stress test negative, seen by vascular and plastic surgery, s/p debridement and application of wound vac for RLE wound on 12/04.

## 2017-12-07 NOTE — PROGRESS NOTE ADULT - SUBJECTIVE AND OBJECTIVE BOX
Internal Medicine Hospitalist - Dr. Ronnie DIALLO    [id number]    67y      Male    Patient is a 67y old  Male who presents with a chief complaint of Right leg bleeding (27 Nov 2017 06:07)    INTERVAL HPI/ OVERNIGHT EVENTS: Patient is seen and examined, no new event overnight.     REVIEW OF SYSTEMS:    Denied fever, chills, abd. pain, nausea, vomiting, chest pain, SOB, headache, dizziness    PHYSICAL EXAM:    Vital Signs Last 24 Hrs  T(C): 37.3 (06 Dec 2017 23:11), Max: 37.3 (06 Dec 2017 23:11)  T(F): 99.2 (06 Dec 2017 23:11), Max: 99.2 (06 Dec 2017 23:11)  HR: 982 (06 Dec 2017 23:11) (64 - 982)  BP: 136/75 (06 Dec 2017 23:11) (102/57 - 136/75)  BP(mean): --  RR: 19 (06 Dec 2017 23:11) (18 - 19)  SpO2: --    GENERAL: NAD  CHEST/LUNG: positive air entry   S1S2+ audible  ABDOMEN: Soft, Nontender, Nondistended; Bowel sounds present  EXTREMITIES:  ace wrap with wound vac       LABS:    12-06    141  |  103  |  25.0<H>  ----------------------------<  101  4.5   |  25.0  |  1.12    Ca    9.0      06 Dec 2017 08:46          MEDICATIONS  (STANDING):  dextrose 50% Injectable 12.5 Gram(s) IV Push once  dextrose 50% Injectable 25 Gram(s) IV Push once  dextrose 50% Injectable 25 Gram(s) IV Push once  insulin lispro (HumaLOG) corrective regimen sliding scale   SubCutaneous Before meals and at bedtime  metoprolol succinate ER 50 milliGRAM(s) Oral daily  sodium chloride 0.45%. 1000 milliLiter(s) (75 mL/Hr) IV Continuous <Continuous>  tamsulosin 0.4 milliGRAM(s) Oral at bedtime    MEDICATIONS  (PRN):  dextrose Gel 1 Dose(s) Oral once PRN Blood Glucose LESS THAN 70 milliGRAM(s)/deciliter  glucagon  Injectable 1 milliGRAM(s) IntraMuscular once PRN Glucose LESS THAN 70 milligrams/deciliter  ondansetron Injectable 4 milliGRAM(s) IV Push every 6 hours PRN Nausea      RADIOLOGY & ADDITIONAL TEST

## 2017-12-07 NOTE — PHYSICAL THERAPY INITIAL EVALUATION ADULT - ADDITIONAL COMMENTS
Pt. lives in an apartment with his wife. Wife works part time. Pt. as 6+6 stairs to enter with one rail and no stairs inside. Pt. does not own DME and was independent with all functional skills prior to admission.

## 2017-12-08 ENCOUNTER — TRANSCRIPTION ENCOUNTER (OUTPATIENT)
Age: 67
End: 2017-12-08

## 2017-12-08 DIAGNOSIS — I83.009 VARICOSE VEINS OF UNSPECIFIED LOWER EXTREMITY WITH ULCER OF UNSPECIFIED SITE: ICD-10-CM

## 2017-12-08 LAB
GLUCOSE BLDC GLUCOMTR-MCNC: 115 MG/DL — HIGH (ref 70–99)
GLUCOSE BLDC GLUCOMTR-MCNC: 177 MG/DL — HIGH (ref 70–99)
GLUCOSE BLDC GLUCOMTR-MCNC: 215 MG/DL — HIGH (ref 70–99)
GLUCOSE BLDC GLUCOMTR-MCNC: 91 MG/DL — SIGNIFICANT CHANGE UP (ref 70–99)
SURGICAL PATHOLOGY FINAL REPORT - CH: SIGNIFICANT CHANGE UP

## 2017-12-08 PROCEDURE — 99232 SBSQ HOSP IP/OBS MODERATE 35: CPT

## 2017-12-08 RX ADMIN — TAMSULOSIN HYDROCHLORIDE 0.4 MILLIGRAM(S): 0.4 CAPSULE ORAL at 21:08

## 2017-12-08 RX ADMIN — Medication 50 MILLIGRAM(S): at 05:05

## 2017-12-08 RX ADMIN — Medication: at 10:55

## 2017-12-08 RX ADMIN — Medication 4: at 21:17

## 2017-12-08 NOTE — DISCHARGE NOTE ADULT - MEDICATION SUMMARY - MEDICATIONS TO TAKE
I will START or STAY ON the medications listed below when I get home from the hospital:    Rolling walker   -- Venous stasis ulcer  ICD 10 I83.009  -- Indication: For Venous stasis ulcer of right calf with other ulcer severity, unspecified whether varicose veins present    Aspirin Enteric Coated 81 mg oral delayed release tablet  -- 1 tab(s) by mouth once a day  -- Indication: For home medication    losartan 25 mg oral tablet  -- 1 tab(s) by mouth once a day  -- Indication: For htn    Flomax 0.4 mg oral capsule  -- 1 cap(s) by mouth once a day  -- Indication: For home medication    metoprolol succinate 50 mg oral tablet, extended release  -- 1 tab(s) by mouth once a day  -- Indication: For home medication    folic acid 1 mg oral tablet  -- 1 tab(s) by mouth once a day  -- Indication: For  supplement

## 2017-12-08 NOTE — DISCHARGE NOTE ADULT - PLAN OF CARE
s/p debridement c/w wound vac, f/u Dr. Mcnulty c/w home medication vasovagal f/u bmp f/u cbc low carb diet

## 2017-12-08 NOTE — DISCHARGE NOTE ADULT - CARE PROVIDERS DIRECT ADDRESSES
,DirectAddress_Unknown,DirectAddress_Unknown,[email or web address],DirectAddress_Unknown

## 2017-12-08 NOTE — DISCHARGE NOTE ADULT - SECONDARY DIAGNOSIS.
Essential hypertension Syncope and collapse CKD (chronic kidney disease) stage 2, GFR 60-89 ml/min Chronic anemia Glucose intolerance (impaired glucose tolerance)

## 2017-12-08 NOTE — DISCHARGE NOTE ADULT - CARE PROVIDER_API CALL
Zackary Mcnulty (MD), Plastic Surgery; Surgery of the Hand  [address]  [address]  [place] [address]  Phone: [phone]  Fax: [phone]    Jhoana Tan), Cardiology; Cardiovascular Disease; Internal Medicine  [address] [place] [address]  Phone: [phone]  Fax: [phone]    Camelia Wan; PhD), Vascular Surgery  [address] [facility]  [address]  [place] [address]  Phone: [phone]  Fax: [phone]    Lorenzo Melendez (DO), Family Medicine  [address] [place] [address]  Phone: [phone]  Fax: [phone]

## 2017-12-08 NOTE — PROGRESS NOTE ADULT - SUBJECTIVE AND OBJECTIVE BOX
Internal Medicine Hospitalist - Dr. Ronnie DIALLO    [id number]    67y      Male    Patient is a 67y old  Male who presents with a chief complaint of Right leg bleeding (27 Nov 2017 06:07)    INTERVAL HPI/ OVERNIGHT EVENTS: Patient is seen and examined, no new event overnight.     REVIEW OF SYSTEMS:    Denied fever, chills, abd. pain, nausea, vomiting, chest pain, SOB, headache, dizziness    PHYSICAL EXAM:    Vital Signs Last 24 Hrs  T(C): 37.2 (08 Dec 2017 08:57), Max: 37.4 (07 Dec 2017 23:30)  T(F): 99 (08 Dec 2017 08:57), Max: 99.3 (07 Dec 2017 23:30)  HR: 76 (08 Dec 2017 08:57) (62 - 76)  BP: 119/65 (08 Dec 2017 08:57) (119/65 - 146/90)  BP(mean): --  RR: 18 (08 Dec 2017 08:57) (18 - 20)  SpO2: 97% (08 Dec 2017 08:57) (97% - 97%)    GENERAL: NAD  CHEST/LUNG: positive air entry , S1S2+ audible  ABDOMEN: Soft, Nontender, Nondistended; Bowel sounds present  EXTREMITIES:  right leg Ace       LABS     MEDICATIONS  (STANDING):  dextrose 50% Injectable 12.5 Gram(s) IV Push once  dextrose 50% Injectable 25 Gram(s) IV Push once  dextrose 50% Injectable 25 Gram(s) IV Push once  insulin lispro (HumaLOG) corrective regimen sliding scale   SubCutaneous Before meals and at bedtime  metoprolol succinate ER 50 milliGRAM(s) Oral daily  tamsulosin 0.4 milliGRAM(s) Oral at bedtime    MEDICATIONS  (PRN):  dextrose Gel 1 Dose(s) Oral once PRN Blood Glucose LESS THAN 70 milliGRAM(s)/deciliter  glucagon  Injectable 1 milliGRAM(s) IntraMuscular once PRN Glucose LESS THAN 70 milligrams/deciliter  ondansetron Injectable 4 milliGRAM(s) IV Push every 6 hours PRN Nausea      RADIOLOGY & ADDITIONAL TEST Internal Medicine Hospitalist - Dr. Ronnie DIALLO    [id number]    67y      Male    Patient is a 67y old  Male who presents with a chief complaint of Right leg bleeding (27 Nov 2017 06:07)    INTERVAL HPI/ OVERNIGHT EVENTS: Patient is seen and examined, no new event overnight.     REVIEW OF SYSTEMS:    Denied fever, chills, abd. pain, nausea, vomiting, chest pain, SOB, headache, dizziness    PHYSICAL EXAM:    Vital Signs Last 24 Hrs  T(C): 37.2 (08 Dec 2017 08:57), Max: 37.4 (07 Dec 2017 23:30)  T(F): 99 (08 Dec 2017 08:57), Max: 99.3 (07 Dec 2017 23:30)  HR: 76 (08 Dec 2017 08:57) (62 - 76)  BP: 119/65 (08 Dec 2017 08:57) (119/65 - 146/90)  BP(mean): --  RR: 18 (08 Dec 2017 08:57) (18 - 20)  SpO2: 97% (08 Dec 2017 08:57) (97% - 97%)    GENERAL: NAD  CHEST/LUNG: positive air entry , S1S2+ audible  ABDOMEN: Soft, Nontender, Nondistended; Bowel sounds present  EXTREMITIES:  right leg Ace       LABS     MEDICATIONS  (STANDING):  dextrose 50% Injectable 12.5 Gram(s) IV Push once  dextrose 50% Injectable 25 Gram(s) IV Push once  dextrose 50% Injectable 25 Gram(s) IV Push once  insulin lispro (HumaLOG) corrective regimen sliding scale   SubCutaneous Before meals and at bedtime  metoprolol succinate ER 50 milliGRAM(s) Oral daily  tamsulosin 0.4 milliGRAM(s) Oral at bedtime    MEDICATIONS  (PRN):  dextrose Gel 1 Dose(s) Oral once PRN Blood Glucose LESS THAN 70 milliGRAM(s)/deciliter  glucagon  Injectable 1 milliGRAM(s) IntraMuscular once PRN Glucose LESS THAN 70 milligrams/deciliter  ondansetron Injectable 4 milliGRAM(s) IV Push every 6 hours PRN Nausea      RADIOLOGY & ADDITIONAL TEST

## 2017-12-08 NOTE — DISCHARGE NOTE ADULT - PATIENT PORTAL LINK FT
“You can access the [employer] Patient Portal, offered by [facility], by registering with the following website: [email or web address]”

## 2017-12-08 NOTE — DISCHARGE NOTE ADULT - CARE PLAN
Principal Discharge DX:	Venous stasis ulcer  Goal:	s/p debridement  Instructions for follow-up, activity and diet:	c/w wound vac, f/u Dr. Mcnulty  Secondary Diagnosis:	Essential hypertension  Instructions for follow-up, activity and diet:	c/w home medication  Secondary Diagnosis:	Syncope and collapse  Goal:	vasovagal  Secondary Diagnosis:	CKD (chronic kidney disease) stage 2, GFR 60-89 ml/min  Instructions for follow-up, activity and diet:	f/u bmp  Secondary Diagnosis:	Chronic anemia  Instructions for follow-up, activity and diet:	f/u cbc  Secondary Diagnosis:	Glucose intolerance (impaired glucose tolerance)  Instructions for follow-up, activity and diet:	low carb diet

## 2017-12-08 NOTE — DISCHARGE NOTE ADULT - HOSPITAL COURSE
This is 66 y/o man with PMH of HTN, CKD, venous ulcer admitted for syncope and episode of bleeding from RLE, venous stasis ulcer. Pt is seen by cardiology. s/p stress test negative, seen by vascular and plastic surgery, s/p debridement and application of wound vac for RLE wound on 12/04. wound van was changed on 12/07/2017. Discussed with surgery, clear to discharge home with wound vac, VNA and f/u with Dr. Mcnulty in 1 week. Pt eval appreciated - home with home care with rolling walker.     A/P    > Venous stasis ulcer of right calf -   appreciate plastic surgery input- s/p debridement and application of wound vac for RLE wound.   VAC changed , discussed with surgery, clear to discharge home with wound vac, VNA and f/u with Dr. Mcnulty   MRI: no evidence of O.M.   Pt eval  - home with home care, rolling walker       > Syncope and collapse  appreciate cardiology input, discussed with Dr. Tan,  Likely vasovagal as it happened when patient came out of shower, no further work up needed   sp echo/carotid US wnl.     Time spent 35 minutes

## 2017-12-08 NOTE — PROGRESS NOTE ADULT - SUBJECTIVE AND OBJECTIVE BOX
SURGICAL PA NOTE:     STATUS POST:      Diagnosis:    Pre-Op Diagnosis:  Wound of lower extremity, right, initial encounter  12/04/2017    Zackary Melissa     Post-Op Dx:  Wound of right lower extremity, initial encounter  12/04/2017    Zackary Melissa    Procedure:    Procedure:  Debridement and application of dressing  12/04/2017    Active  MIAN.       Operative Findings:  · Operative Findings	Debridement of Right leg wound [id number], Integra, Incisional biopsy x 3, VAC	    Specimens/Blood Loss/IV/Output/Protocol/VTE:    Specimens/Blood Loss/IV/Output/Protocol/VTE:  · Specimens	deep wound, superior and inferior margins	  · Estimated Blood Loss	15 milliLiter(s)	      POST OPERATIVE DAY #: 4    Vital Signs Last 24 Hrs  T(C): 37.2 (08 Dec 2017 08:57), Max: 37.4 (07 Dec 2017 23:30)  T(F): 99 (08 Dec 2017 08:57), Max: 99.3 (07 Dec 2017 23:30)  HR: 76 (08 Dec 2017 08:57) (62 - 76)  BP: 119/65 (08 Dec 2017 08:57) (119/65 - 146/90)  BP(mean): --  RR: 18 (08 Dec 2017 08:57) (18 - 20)  SpO2: 97% (08 Dec 2017 08:57) (97% - 97%)    HPI:  68y/o male with hx. of chronic RLE venous stasis ulcer for past 3 years who was being followed by private Vascular surgeon until earlier this year presents to ED by EMS after wound started bleeding spontaneously. Patient states he is no longer following with Dr. Aguirre, because they dont take his insurance. He has been using medihoney and dry dressing at home. Denies any recent wound drainage or foul smell. No fever, chills, N/V, SOB, CP. He states he has had vascular studies in past showing sufficient blood flow to lower leg. Patient was taking a shower after he saw the bleeding and then had a syncopal episode witnessed by his wife. She denies any seizure like activity. No hx of CAD/CVA. Patient in ED with non-diagnostic CVA w/u. Labs at baseline. No focal weakness. EKG with no acute changes. RLE wound with no evidence of active infection. He will be admitted for Vascular surgery/wound care eval and syncope w/u. (27 Nov 2017 06:07)      Syncope and collapse  No h/o HF  No pertinent family history in first degree relatives  Handoff  MEWS Score  Chronic anemia  CKD (chronic kidney disease) stage 2, GFR 60-89 ml/min  Glucose intolerance (impaired glucose tolerance)  MRSA (methicillin resistant Staphylococcus aureus) infection  Venous stasis ulcer  Hypertension  Wound of right lower extremity, initial encounter  Wound of lower extremity, right, initial encounter  Venous stasis ulcer  Syncope and collapse  Ulcer of right ankle, with necrosis of muscle  CKD (chronic kidney disease) stage 2, GFR 60-89 ml/min  Chronic anemia  Glucose intolerance (impaired glucose tolerance)  Essential hypertension  Syncope and collapse  Venous stasis ulcer of right calf with other ulcer severity, unspecified whether varicose veins present  Debridement and application of dressing  No significant past surgical history  BLEEDING ULCER ON RT LEG  90+  Glucose intolerance (impaired glucose tolerance)  Chronic anemia  CKD (chronic kidney disease) stage 2, GFR 60-89 ml/min  Syncope and collapse  Essential hypertension  Ulcer of right ankle, with necrosis of muscle      SUBJECTIVE: Pt seen sitting in bedside chair    Diet:    Activity:     Fevers: [ ]Yes [ ]NO  Chills: [ ] Yes [ ] NO  SOB:  [ ] YES [ ] NO  Dyspnea: [ ]YES [ ]NO  Chest Discomfort: [ ] YES [ ] NO    Nausea: [ ] YES [ ] NO           Vomiting: [ ] YES [ ] NO  Flatus: [ ] YES [ ] NO             Bowel Movement: [ ] YES [ ] NO  Diarrhea: [ ] YES [ ] NO         Void: [ ]YES [ ]No  Constipation: [ ] YES [ ] NO       Pain (0-10):              Pain Control Adequate: [ ] YES [ ] NO    Dunn:    NGT:      I&O's Detail    07 Dec 2017 07:01  -  08 Dec 2017 07:00  --------------------------------------------------------  IN:    sodium chloride 0.45%: 150 mL  Total IN: 150 mL    OUT:    Voided: 1300 mL  Total OUT: 1300 mL    Total NET: -1150 mL      08 Dec 2017 07:01  -  08 Dec 2017 13:00  --------------------------------------------------------  IN:    Oral Fluid: 200 mL  Total IN: 200 mL    OUT:    Voided: 300 mL  Total OUT: 300 mL    Total NET: -100 mL        I&O's Summary    07 Dec 2017 07:01  -  08 Dec 2017 07:00  --------------------------------------------------------  IN: 150 mL / OUT: 1300 mL / NET: -1150 mL    08 Dec 2017 07:01  -  08 Dec 2017 13:00  --------------------------------------------------------  IN: 200 mL / OUT: 300 mL / NET: -100 mL      I&O's Detail    07 Dec 2017 07:01  -  08 Dec 2017 07:00  --------------------------------------------------------  IN:    sodium chloride 0.45%: 150 mL  Total IN: 150 mL    OUT:    Voided: 1300 mL  Total OUT: 1300 mL    Total NET: -1150 mL      08 Dec 2017 07:01  -  08 Dec 2017 13:00  --------------------------------------------------------  IN:    Oral Fluid: 200 mL  Total IN: 200 mL    OUT:    Voided: 300 mL  Total OUT: 300 mL    Total NET: -100 mL          MEDICATIONS  (STANDING):  dextrose 50% Injectable 12.5 Gram(s) IV Push once  dextrose 50% Injectable 25 Gram(s) IV Push once  dextrose 50% Injectable 25 Gram(s) IV Push once  insulin lispro (HumaLOG) corrective regimen sliding scale   SubCutaneous Before meals and at bedtime  metoprolol succinate ER 50 milliGRAM(s) Oral daily  tamsulosin 0.4 milliGRAM(s) Oral at bedtime    MEDICATIONS  (PRN):  dextrose Gel 1 Dose(s) Oral once PRN Blood Glucose LESS THAN 70 milliGRAM(s)/deciliter  glucagon  Injectable 1 milliGRAM(s) IntraMuscular once PRN Glucose LESS THAN 70 milligrams/deciliter  ondansetron Injectable 4 milliGRAM(s) IV Push every 6 hours PRN Nausea      LABS:                  RADIOLOGY & ADDITIONAL STUDIES:

## 2017-12-08 NOTE — PROGRESS NOTE ADULT - ASSESSMENT
This is 66 y/o man with PMH of HTN, CKD, venous ulcer admitted for syncope and episode of bleeding from RLE, venous stasis ulcer. Pt is seen by cardiology. s/p stress test negative, seen by vascular and plastic surgery, s/p debridement and application of wound vac for RLE wound on 12/04. wound van was changed on 12/07/2017. Pt eval appreciated - home with home care with rolling walker.     A/P    > Venous stasis ulcer of right calf -   appreciate plastic surgery input- s/p debridement and application of wound vac for RLE wound.   VAC will be change tomorrow   MRI: no evidence of O.M.   Pt eval pending       > Syncope and collapse  appreciate cardiology input, discussed with Dr. Tan,  Likely vasovagal as it happened when patient came out of shower, no further work up needed   sp echo/carotid US wnl.     >Essential hypertension- normotensive   c/w metoprolol, hold losartan for now   monitor BP     > Chronic anemia - H&H stable     >CKD (chronic kidney disease) stage 2, GFR 60-89 ml/min.- back to baseline     >DVT PPX- SCD  pharmacologic DVt PPX as per plastic surgery     >Disposition - home with home care and wound vac This is 68 y/o man with PMH of HTN, CKD, venous ulcer admitted for syncope and episode of bleeding from RLE, venous stasis ulcer. Pt is seen by cardiology. s/p stress test negative, seen by vascular and plastic surgery, s/p debridement and application of wound vac for RLE wound on 12/04. wound van was changed on 12/07/2017. Pt eval appreciated - home with home care with rolling walker.     A/P    > Venous stasis ulcer of right calf -   appreciate plastic surgery input- s/p debridement and application of wound vac for RLE wound.   VAC will be change tomorrow   MRI: no evidence of O.M.   Pt eval pending       > Syncope and collapse  appreciate cardiology input, discussed with Dr. Tan,  Likely vasovagal as it happened when patient came out of shower, no further work up needed   sp echo/carotid US wnl.     >Essential hypertension- normotensive   c/w home medication     > Chronic anemia - H&H stable     >CKD (chronic kidney disease) stage 2, GFR 60-89 ml/min.- back to baseline     >DVT PPX- SCD  pharmacologic DVt PPX as per plastic surgery     >Disposition - home with home care and wound vac

## 2017-12-08 NOTE — PROGRESS NOTE ADULT - ATTENDING COMMENTS
discussed with patient and daughter present bedside, also talked to his wife Cheryle on phone, updated

## 2017-12-09 DIAGNOSIS — Z29.9 ENCOUNTER FOR PROPHYLACTIC MEASURES, UNSPECIFIED: ICD-10-CM

## 2017-12-09 LAB
GLUCOSE BLDC GLUCOMTR-MCNC: 124 MG/DL — HIGH (ref 70–99)
GLUCOSE BLDC GLUCOMTR-MCNC: 129 MG/DL — HIGH (ref 70–99)
GLUCOSE BLDC GLUCOMTR-MCNC: 159 MG/DL — HIGH (ref 70–99)
GLUCOSE BLDC GLUCOMTR-MCNC: 92 MG/DL — SIGNIFICANT CHANGE UP (ref 70–99)

## 2017-12-09 PROCEDURE — 99233 SBSQ HOSP IP/OBS HIGH 50: CPT

## 2017-12-09 RX ADMIN — Medication 50 MILLIGRAM(S): at 05:03

## 2017-12-09 RX ADMIN — TAMSULOSIN HYDROCHLORIDE 0.4 MILLIGRAM(S): 0.4 CAPSULE ORAL at 21:23

## 2017-12-09 RX ADMIN — Medication 2: at 07:47

## 2017-12-09 NOTE — PROGRESS NOTE ADULT - SUBJECTIVE AND OBJECTIVE BOX
ALIZA DIALLO    [id number]    67y      Male    INTERVAL HPI/OVERNIGHT EVENTS: No events on. Feels well.    Hospital course:  66 y/o man with PMH of HTN, CKD, venous ulcer admitted for syncope and episode of bleeding from RLE, venous stasis ulcer. Pt is seen by cardiology. s/p stress test negative, seen by vascular and plastic surgery, s/p debridement and application of wound vac for RLE wound on 12/04. wound van was changed on 12/07/2017. Discussed with surgery, clear to discharge home with wound vac, VNA and f/u with Dr. Mcnulty in 1 week. Pt eval appreciated - home with home care with rolling walker.     REVIEW OF SYSTEMS:    CONSTITUTIONAL: No fever   RESPIRATORY: No cough   CARDIOVASCULAR: No chest pain       Vital Signs Last 24 Hrs  T(C): 36.7 (09 Dec 2017 08:45), Max: 37.2 (08 Dec 2017 23:18)  T(F): 98.1 (09 Dec 2017 08:45), Max: 98.9 (08 Dec 2017 23:18)  HR: 67 (09 Dec 2017 08:45) (66 - 67)  BP: 112/67 (09 Dec 2017 08:45) (112/67 - 126/82)  BP(mean): --  RR: 67 (09 Dec 2017 08:45) (18 - 67)  SpO2: 97% (09 Dec 2017 08:45) (96% - 98%)    PHYSICAL EXAM:    GENERAL: NAD   HEENT: PERRL, +EOMI  CHEST/LUNG: Clear to percussion bilaterally   HEART: S1S2+   ABDOMEN: Soft, Nontender, Nondistended; Bowel sounds present       LABS:                  MEDICATIONS  (STANDING):  dextrose 50% Injectable 12.5 Gram(s) IV Push once  dextrose 50% Injectable 25 Gram(s) IV Push once  dextrose 50% Injectable 25 Gram(s) IV Push once  insulin lispro (HumaLOG) corrective regimen sliding scale   SubCutaneous Before meals and at bedtime  metoprolol succinate ER 50 milliGRAM(s) Oral daily  tamsulosin 0.4 milliGRAM(s) Oral at bedtime    MEDICATIONS  (PRN):  dextrose Gel 1 Dose(s) Oral once PRN Blood Glucose LESS THAN 70 milliGRAM(s)/deciliter  glucagon  Injectable 1 milliGRAM(s) IntraMuscular once PRN Glucose LESS THAN 70 milligrams/deciliter  ondansetron Injectable 4 milliGRAM(s) IV Push every 6 hours PRN Nausea      RADIOLOGY & ADDITIONAL TESTS:

## 2017-12-10 LAB
GLUCOSE BLDC GLUCOMTR-MCNC: 118 MG/DL — HIGH (ref 70–99)
GLUCOSE BLDC GLUCOMTR-MCNC: 119 MG/DL — HIGH (ref 70–99)
GLUCOSE BLDC GLUCOMTR-MCNC: 143 MG/DL — HIGH (ref 70–99)
GLUCOSE BLDC GLUCOMTR-MCNC: 179 MG/DL — HIGH (ref 70–99)

## 2017-12-10 PROCEDURE — 99232 SBSQ HOSP IP/OBS MODERATE 35: CPT

## 2017-12-10 RX ORDER — ACETAMINOPHEN 500 MG
650 TABLET ORAL EVERY 6 HOURS
Qty: 0 | Refills: 0 | Status: DISCONTINUED | OUTPATIENT
Start: 2017-12-10 | End: 2017-12-12

## 2017-12-10 RX ORDER — FERROUS SULFATE 325(65) MG
325 TABLET ORAL DAILY
Qty: 0 | Refills: 0 | Status: DISCONTINUED | OUTPATIENT
Start: 2017-12-10 | End: 2017-12-12

## 2017-12-10 RX ADMIN — TAMSULOSIN HYDROCHLORIDE 0.4 MILLIGRAM(S): 0.4 CAPSULE ORAL at 22:00

## 2017-12-10 RX ADMIN — Medication 2: at 22:00

## 2017-12-10 RX ADMIN — Medication 325 MILLIGRAM(S): at 16:10

## 2017-12-10 RX ADMIN — Medication 50 MILLIGRAM(S): at 04:59

## 2017-12-10 RX ADMIN — Medication 650 MILLIGRAM(S): at 10:51

## 2017-12-10 RX ADMIN — Medication 650 MILLIGRAM(S): at 11:39

## 2017-12-10 NOTE — PROGRESS NOTE ADULT - SUBJECTIVE AND OBJECTIVE BOX
ALIZA DIALLO    [id number]    67y      Male    INTERVAL HPI/OVERNIGHT EVENTS: No events on. Feels well today.    Hospital course:  68 y/o man with PMH of HTN, CKD, venous ulcer admitted for syncope and episode of bleeding from RLE, venous stasis ulcer. Pt is seen by cardiology. s/p stress test negative, seen by vascular and plastic surgery, s/p debridement and application of wound vac for RLE wound on 12/04. wound van was changed on 12/07/2017. Discussed with surgery, clear to discharge home with wound vac, VNA and f/u with Dr. Mcnulty in 1 week. Pt eval appreciated - home with home care with rolling walker.     REVIEW OF SYSTEMS:    CONSTITUTIONAL: No fever   RESPIRATORY: No cough  CARDIOVASCULAR: No chest pain       Vital Signs Last 24 Hrs  T(C): 36.8 (10 Dec 2017 08:42), Max: 36.9 (09 Dec 2017 16:09)  T(F): 98.2 (10 Dec 2017 08:42), Max: 98.4 (09 Dec 2017 16:09)  HR: 61 (10 Dec 2017 08:42) (61 - 76)  BP: 120/66 (10 Dec 2017 08:42) (105/66 - 120/66)  BP(mean): --  RR: 18 (10 Dec 2017 08:42) (18 - 20)  SpO2: 98% (10 Dec 2017 08:42) (96% - 98%)    PHYSICAL EXAM:    GENERAL: NAD  HEENT: MMM  CHEST/LUNG: Clear to percussion bilaterally   HEART: S1S2+, Regular rate and rhythm    ABDOMEN: Soft, Nontender, Nondistended; Bowel sounds present     LABS:                  MEDICATIONS  (STANDING):  dextrose 50% Injectable 12.5 Gram(s) IV Push once  dextrose 50% Injectable 25 Gram(s) IV Push once  dextrose 50% Injectable 25 Gram(s) IV Push once  insulin lispro (HumaLOG) corrective regimen sliding scale   SubCutaneous Before meals and at bedtime  metoprolol succinate ER 50 milliGRAM(s) Oral daily  tamsulosin 0.4 milliGRAM(s) Oral at bedtime    MEDICATIONS  (PRN):  acetaminophen   Tablet. 650 milliGRAM(s) Oral every 6 hours PRN Moderate Pain (4 - 6)  dextrose Gel 1 Dose(s) Oral once PRN Blood Glucose LESS THAN 70 milliGRAM(s)/deciliter  glucagon  Injectable 1 milliGRAM(s) IntraMuscular once PRN Glucose LESS THAN 70 milligrams/deciliter  ondansetron Injectable 4 milliGRAM(s) IV Push every 6 hours PRN Nausea      RADIOLOGY & ADDITIONAL TESTS:

## 2017-12-11 VITALS
DIASTOLIC BLOOD PRESSURE: 82 MMHG | RESPIRATION RATE: 18 BRPM | SYSTOLIC BLOOD PRESSURE: 133 MMHG | HEART RATE: 65 BPM | OXYGEN SATURATION: 96 % | TEMPERATURE: 98 F

## 2017-12-11 LAB
GLUCOSE BLDC GLUCOMTR-MCNC: 106 MG/DL — HIGH (ref 70–99)
GLUCOSE BLDC GLUCOMTR-MCNC: 119 MG/DL — HIGH (ref 70–99)
GLUCOSE BLDC GLUCOMTR-MCNC: 119 MG/DL — HIGH (ref 70–99)
GLUCOSE BLDC GLUCOMTR-MCNC: 144 MG/DL — HIGH (ref 70–99)

## 2017-12-11 PROCEDURE — 83735 ASSAY OF MAGNESIUM: CPT

## 2017-12-11 PROCEDURE — 86850 RBC ANTIBODY SCREEN: CPT

## 2017-12-11 PROCEDURE — 78452 HT MUSCLE IMAGE SPECT MULT: CPT

## 2017-12-11 PROCEDURE — 99233 SBSQ HOSP IP/OBS HIGH 50: CPT

## 2017-12-11 PROCEDURE — 97110 THERAPEUTIC EXERCISES: CPT

## 2017-12-11 PROCEDURE — 83880 ASSAY OF NATRIURETIC PEPTIDE: CPT

## 2017-12-11 PROCEDURE — 80053 COMPREHEN METABOLIC PANEL: CPT

## 2017-12-11 PROCEDURE — 84100 ASSAY OF PHOSPHORUS: CPT

## 2017-12-11 PROCEDURE — 88304 TISSUE EXAM BY PATHOLOGIST: CPT

## 2017-12-11 PROCEDURE — 82550 ASSAY OF CK (CPK): CPT

## 2017-12-11 PROCEDURE — 36415 COLL VENOUS BLD VENIPUNCTURE: CPT

## 2017-12-11 PROCEDURE — 97116 GAIT TRAINING THERAPY: CPT

## 2017-12-11 PROCEDURE — 84484 ASSAY OF TROPONIN QUANT: CPT

## 2017-12-11 PROCEDURE — 97163 PT EVAL HIGH COMPLEX 45 MIN: CPT

## 2017-12-11 PROCEDURE — 82962 GLUCOSE BLOOD TEST: CPT

## 2017-12-11 PROCEDURE — 85027 COMPLETE CBC AUTOMATED: CPT

## 2017-12-11 PROCEDURE — 83036 HEMOGLOBIN GLYCOSYLATED A1C: CPT

## 2017-12-11 PROCEDURE — 93306 TTE W/DOPPLER COMPLETE: CPT

## 2017-12-11 PROCEDURE — 83605 ASSAY OF LACTIC ACID: CPT

## 2017-12-11 PROCEDURE — A9500: CPT

## 2017-12-11 PROCEDURE — 97530 THERAPEUTIC ACTIVITIES: CPT

## 2017-12-11 PROCEDURE — 93880 EXTRACRANIAL BILAT STUDY: CPT

## 2017-12-11 PROCEDURE — 87040 BLOOD CULTURE FOR BACTERIA: CPT

## 2017-12-11 PROCEDURE — 80048 BASIC METABOLIC PNL TOTAL CA: CPT

## 2017-12-11 PROCEDURE — 99261: CPT

## 2017-12-11 PROCEDURE — 73719 MRI LOWER EXTREMITY W/DYE: CPT

## 2017-12-11 PROCEDURE — 85610 PROTHROMBIN TIME: CPT

## 2017-12-11 PROCEDURE — 85730 THROMBOPLASTIN TIME PARTIAL: CPT

## 2017-12-11 PROCEDURE — 70450 CT HEAD/BRAIN W/O DYE: CPT

## 2017-12-11 PROCEDURE — 93017 CV STRESS TEST TRACING ONLY: CPT

## 2017-12-11 PROCEDURE — 86901 BLOOD TYPING SEROLOGIC RH(D): CPT

## 2017-12-11 PROCEDURE — 93005 ELECTROCARDIOGRAM TRACING: CPT

## 2017-12-11 PROCEDURE — 86900 BLOOD TYPING SEROLOGIC ABO: CPT

## 2017-12-11 PROCEDURE — 99285 EMERGENCY DEPT VISIT HI MDM: CPT | Mod: 25

## 2017-12-11 PROCEDURE — 73590 X-RAY EXAM OF LOWER LEG: CPT

## 2017-12-11 RX ADMIN — Medication 325 MILLIGRAM(S): at 11:05

## 2017-12-11 RX ADMIN — Medication 50 MILLIGRAM(S): at 05:15

## 2017-12-11 RX ADMIN — TAMSULOSIN HYDROCHLORIDE 0.4 MILLIGRAM(S): 0.4 CAPSULE ORAL at 22:11

## 2017-12-11 NOTE — PROGRESS NOTE ADULT - PROBLEM SELECTOR PROBLEM 2
Essential hypertension
CKD (chronic kidney disease) stage 2, GFR 60-89 ml/min

## 2017-12-11 NOTE — PROGRESS NOTE ADULT - PROBLEM SELECTOR PLAN 1
S/p debridement  Wound vac  F/u Dr. Mcnulty
pt to be discharged home with Home VAC, remaining staples can be removed in 2 weeks, VAC change approx every 3 days, plan per Dr Mcnulty

## 2017-12-11 NOTE — PROGRESS NOTE ADULT - SUBJECTIVE AND OBJECTIVE BOX
ALIZA DIALLO    [id number]    67y      Male    INTERVAL HPI/OVERNIGHT EVENTS: No events on. Feels well today.     Hospital course:  66 y/o man with PMH of HTN, CKD, venous ulcer admitted for syncope and episode of bleeding from RLE, venous stasis ulcer. Pt is seen by cardiology. s/p stress test negative, seen by vascular and plastic surgery, s/p debridement and application of wound vac for RLE wound on 12/04. wound van was changed on 12/07/2017. Discussed with surgery, clear to discharge home with wound vac, VNA and f/u with Dr. Mcnulty in 1 week. Pt eval appreciated - home with home care with rolling walker.       REVIEW OF SYSTEMS:    RESPIRATORY: No cough   CARDIOVASCULAR: No chest pain       Vital Signs Last 24 Hrs  T(C): 36.7 (11 Dec 2017 00:29), Max: 36.9 (10 Dec 2017 15:50)  T(F): 98.1 (11 Dec 2017 00:29), Max: 98.4 (10 Dec 2017 15:50)  HR: 65 (11 Dec 2017 05:13) (65 - 80)  BP: 116/67 (11 Dec 2017 05:13) (116/67 - 124/75)  BP(mean): --  RR: 18 (11 Dec 2017 00:29) (18 - 18)  SpO2: 98% (11 Dec 2017 00:29) (98% - 99%) RA    PHYSICAL EXAM:    GENERAL: NAD   HEENT: PERRL, +EOMI  CHEST/LUNG: Clear to percussion bilaterally; No wheezing  HEART: S1S2+, Regular rate and rhythm   ABDOMEN: Soft, Nontender, Nondistended; Bowel sounds present  EXT: Wound vac in place RLE       LABS:                  MEDICATIONS  (STANDING):  dextrose 50% Injectable 12.5 Gram(s) IV Push once  dextrose 50% Injectable 25 Gram(s) IV Push once  dextrose 50% Injectable 25 Gram(s) IV Push once  ferrous    sulfate 325 milliGRAM(s) Oral daily  insulin lispro (HumaLOG) corrective regimen sliding scale   SubCutaneous Before meals and at bedtime  metoprolol succinate ER 50 milliGRAM(s) Oral daily  tamsulosin 0.4 milliGRAM(s) Oral at bedtime    MEDICATIONS  (PRN):  acetaminophen   Tablet. 650 milliGRAM(s) Oral every 6 hours PRN Moderate Pain (4 - 6)  dextrose Gel 1 Dose(s) Oral once PRN Blood Glucose LESS THAN 70 milliGRAM(s)/deciliter  glucagon  Injectable 1 milliGRAM(s) IntraMuscular once PRN Glucose LESS THAN 70 milligrams/deciliter  ondansetron Injectable 4 milliGRAM(s) IV Push every 6 hours PRN Nausea      RADIOLOGY & ADDITIONAL TESTS:

## 2017-12-11 NOTE — PROGRESS NOTE ADULT - PROVIDER SPECIALTY LIST ADULT
Anesthesia
Hospitalist
Infectious Disease
Plastic Surgery
Urology
Vascular Surgery
Vascular Surgery
Hospitalist

## 2017-12-11 NOTE — PROGRESS NOTE ADULT - PROBLEM SELECTOR PROBLEM 5
Glucose intolerance (impaired glucose tolerance)
Prophylactic measure

## 2017-12-11 NOTE — PROGRESS NOTE ADULT - PROBLEM SELECTOR PROBLEM 1
Venous stasis ulcer
Syncope and collapse
Venous stasis ulcer

## 2017-12-15 ENCOUNTER — EMERGENCY (EMERGENCY)
Facility: HOSPITAL | Age: 67
LOS: 1 days | Discharge: HOME | End: 2017-12-15
Attending: EMERGENCY MEDICINE | Admitting: EMERGENCY MEDICINE
Payer: COMMERCIAL

## 2017-12-15 VITALS
SYSTOLIC BLOOD PRESSURE: 185 MMHG | OXYGEN SATURATION: 99 % | DIASTOLIC BLOOD PRESSURE: 73 MMHG | HEART RATE: 77 BPM | TEMPERATURE: 98 F | HEIGHT: 73 IN | WEIGHT: 229.94 LBS | RESPIRATION RATE: 20 BRPM

## 2017-12-15 PROCEDURE — 99282 EMERGENCY DEPT VISIT SF MDM: CPT

## 2017-12-15 NOTE — ED PROVIDER NOTE - OBJECTIVE STATEMENT
68 yo bm pmh  rt leg ulcer s/p debridement by Dr Marie sent in by wound care nurse for discharge and smell; pt presently has wound vac ; denies fever, chills, nausea or vomiting; pt presently on no abx

## 2017-12-15 NOTE — ED STATDOCS - PROGRESS NOTE DETAILS
This is a 66 y/o M presenting to the ED complaining of wound check. Wife reports that patient was discharged from [facility] three days ago after wound dbridement after a wound repair by Dr. Marie . Patient had a home nurse to come to his residence to wrap and treat the wound. Today, patient's wife reports, the nurse noticed that the wound was malodorous and told the patient to go to the ED. Wife reports calling Dr. Marie's office and leaving a message. Patient to be place in main ED for further evaluation.

## 2017-12-15 NOTE — CONSULT NOTE ADULT - SUBJECTIVE AND OBJECTIVE BOX
67 year old male s/p debridement of RLE ,  integra [id number], incisional biopsy and application of vac on 12/4 [facility] Dr. Mcnulty, presents to ER with c/o odor from wound since Monday (4 days ago). Patient says he feels well, and has been having regular vac changes since his discharge .   Denies any complaints, fever, chills, pain .     On exam, non toxic, VSS, NAD   RLE : RLE wound with staples intact , integra intact , appears well perfused and viable.  No surrounding erthyma, +malodorous

## 2017-12-15 NOTE — ED ADULT TRIAGE NOTE - CHIEF COMPLAINT QUOTE
had an open wound to right lower leg, was debrided last week, was d/c on monday and now has a foul smell  and not healing

## 2017-12-15 NOTE — CONSULT NOTE ADULT - ASSESSMENT
POD#11 s/p RLE debridement and integra application   - DW Dr. Mcnulty - does not appear to be infected , integra may just be releasing odor, requesting staples to be removed (done by me ) and patient to have WTD daily.   - Follow up in 1 week .   - Pt and pt wife aware of plan.   - Discharge

## 2017-12-15 NOTE — ED ADULT NURSE NOTE - OBJECTIVE STATEMENT
Assumed patient care at 2010.  A+Ox4, right lower leg wound, in hosp last wk for debriedment, sent home with wound vac.  reports foul smelling odor from wound today

## 2018-01-25 ENCOUNTER — OUTPATIENT (OUTPATIENT)
Dept: OUTPATIENT SERVICES | Facility: HOSPITAL | Age: 68
LOS: 1 days | End: 2018-01-25
Payer: COMMERCIAL

## 2018-01-25 VITALS
WEIGHT: 233.91 LBS | DIASTOLIC BLOOD PRESSURE: 68 MMHG | RESPIRATION RATE: 16 BRPM | HEART RATE: 64 BPM | TEMPERATURE: 98 F | SYSTOLIC BLOOD PRESSURE: 136 MMHG

## 2018-01-25 DIAGNOSIS — Z01.818 ENCOUNTER FOR OTHER PREPROCEDURAL EXAMINATION: ICD-10-CM

## 2018-01-25 DIAGNOSIS — S81.801A UNSPECIFIED OPEN WOUND, RIGHT LOWER LEG, INITIAL ENCOUNTER: ICD-10-CM

## 2018-01-25 DIAGNOSIS — Z98.890 OTHER SPECIFIED POSTPROCEDURAL STATES: Chronic | ICD-10-CM

## 2018-01-25 DIAGNOSIS — L97.901 NON-PRESSURE CHRONIC ULCER OF UNSPECIFIED PART OF UNSPECIFIED LOWER LEG LIMITED TO BREAKDOWN OF SKIN: ICD-10-CM

## 2018-01-25 LAB
ALBUMIN SERPL ELPH-MCNC: 3.1 G/DL — LOW (ref 3.3–5)
ALP SERPL-CCNC: 83 U/L — SIGNIFICANT CHANGE UP (ref 40–120)
ALT FLD-CCNC: 10 U/L — LOW (ref 12–78)
ANION GAP SERPL CALC-SCNC: 9 MMOL/L — SIGNIFICANT CHANGE UP (ref 5–17)
AST SERPL-CCNC: 8 U/L — LOW (ref 15–37)
BILIRUB SERPL-MCNC: 0.4 MG/DL — SIGNIFICANT CHANGE UP (ref 0.2–1.2)
BUN SERPL-MCNC: 10 MG/DL — SIGNIFICANT CHANGE UP (ref 7–23)
CALCIUM SERPL-MCNC: 9 MG/DL — SIGNIFICANT CHANGE UP (ref 8.5–10.1)
CHLORIDE SERPL-SCNC: 104 MMOL/L — SIGNIFICANT CHANGE UP (ref 96–108)
CO2 SERPL-SCNC: 27 MMOL/L — SIGNIFICANT CHANGE UP (ref 22–31)
CREAT SERPL-MCNC: 1.1 MG/DL — SIGNIFICANT CHANGE UP (ref 0.5–1.3)
GLUCOSE SERPL-MCNC: 140 MG/DL — HIGH (ref 70–99)
HBA1C BLD-MCNC: 5.8 % — HIGH (ref 4–5.6)
HCT VFR BLD CALC: 31 % — LOW (ref 39–50)
HGB BLD-MCNC: 10 G/DL — LOW (ref 13–17)
MCHC RBC-ENTMCNC: 24.5 PG — LOW (ref 27–34)
MCHC RBC-ENTMCNC: 32.2 GM/DL — SIGNIFICANT CHANGE UP (ref 32–36)
MCV RBC AUTO: 76 FL — LOW (ref 80–100)
PLATELET # BLD AUTO: 444 K/UL — HIGH (ref 150–400)
POTASSIUM SERPL-MCNC: 3.9 MMOL/L — SIGNIFICANT CHANGE UP (ref 3.5–5.3)
POTASSIUM SERPL-SCNC: 3.9 MMOL/L — SIGNIFICANT CHANGE UP (ref 3.5–5.3)
PROT SERPL-MCNC: 8.1 G/DL — SIGNIFICANT CHANGE UP (ref 6–8.3)
RBC # BLD: 4.08 M/UL — LOW (ref 4.2–5.8)
RBC # FLD: 13.7 % — SIGNIFICANT CHANGE UP (ref 10.3–14.5)
SODIUM SERPL-SCNC: 140 MMOL/L — SIGNIFICANT CHANGE UP (ref 135–145)
WBC # BLD: 6.7 K/UL — SIGNIFICANT CHANGE UP (ref 3.8–10.5)
WBC # FLD AUTO: 6.7 K/UL — SIGNIFICANT CHANGE UP (ref 3.8–10.5)

## 2018-01-25 PROCEDURE — 85027 COMPLETE CBC AUTOMATED: CPT

## 2018-01-25 PROCEDURE — G0463: CPT

## 2018-01-25 PROCEDURE — 93010 ELECTROCARDIOGRAM REPORT: CPT | Mod: NC

## 2018-01-25 PROCEDURE — 93005 ELECTROCARDIOGRAM TRACING: CPT

## 2018-01-25 PROCEDURE — 80053 COMPREHEN METABOLIC PANEL: CPT

## 2018-01-25 PROCEDURE — 83036 HEMOGLOBIN GLYCOSYLATED A1C: CPT

## 2018-01-25 PROCEDURE — 36415 COLL VENOUS BLD VENIPUNCTURE: CPT

## 2018-01-25 RX ORDER — LOSARTAN POTASSIUM 100 MG/1
1 TABLET, FILM COATED ORAL
Qty: 0 | Refills: 0 | COMMUNITY

## 2018-01-25 NOTE — H&P PST ADULT - PMH
Chronic anemia    CKD (chronic kidney disease) stage 2, GFR 60-89 ml/min    Glucose intolerance (impaired glucose tolerance)    H/O sickle cell trait    Hypertension    MRSA (methicillin resistant Staphylococcus aureus) infection  inner thigh 3/2016  Venous stasis ulcer Chronic anemia    CKD (chronic kidney disease) stage 2, GFR 60-89 ml/min    Glucose intolerance (impaired glucose tolerance)    H/O sickle cell trait    Hypertension    MRSA (methicillin resistant Staphylococcus aureus) infection  inner thigh 3/2016  Poor historian  (Pt forgetful at times)  Unspecified open wound, right lower leg, initial encounter BPH (benign prostatic hyperplasia)    Chronic anemia    CKD (chronic kidney disease) stage 2, GFR 60-89 ml/min    Glucose intolerance (impaired glucose tolerance)    H/O sickle cell trait    Hypertension    MRSA (methicillin resistant Staphylococcus aureus) infection  inner thigh 3/2016  Poor historian  (Pt forgetful at times)  Unspecified open wound, right lower leg, initial encounter

## 2018-01-25 NOTE — H&P PST ADULT - RS GEN PE MLT RESP DETAILS PC
breath sounds equal/normal/airway patent/good air movement/respirations non-labored/clear to auscultation bilaterally

## 2018-01-25 NOTE — H&P PST ADULT - NEGATIVE CARDIOVASCULAR SYMPTOMS
no orthopnea/no palpitations/no claudication/no chest pain/no peripheral edema/no dyspnea on exertion/no paroxysmal nocturnal dyspnea

## 2018-01-25 NOTE — H&P PST ADULT - NS MD HP SKIN WOUND STATUS
Large open wound on right leg with yellow drainage, no bleeding noted, non-tender Large open wound on right lower leg with yellow drainage, no bleeding noted, non-tender

## 2018-01-25 NOTE — H&P PST ADULT - NSANTHOSAYNRD_GEN_A_CORE
No. MELODIE screening performed.  STOP BANG Legend: 0-2 = LOW Risk; 3-4 = INTERMEDIATE Risk; 5-8 = HIGH Risk

## 2018-01-25 NOTE — H&P PST ADULT - PROBLEM SELECTOR PLAN 2
Medical clearance and cardiac clearance needed. CBC, Comprehensive panel, HgbA1c and EKG ordered. Pre-op instructions and surgical scrubs given and pt verbalized understanding. Faxed echo and stress test results to both PCP and cardiologist's office. Dressing changed to right lower leg. Medical clearance and cardiac clearance needed. CBC, Comprehensive panel, HgbA1c and EKG ordered. Pre-op instructions and surgical scrub x 1 given and pt verbalized understanding. Faxed echo and stress test results to both PCP and cardiologist's office. Dressing changed to right lower leg. Medical clearance and cardiac clearance needed. CBC, Comprehensive panel, HgbA1c and EKG ordered. Pre-op instructions and surgical scrub x 1 given and pt and pt's wife verbalized understanding. Faxed echo and stress test results to both PCP and cardiologist's office. Dressing changed to right lower leg.

## 2018-01-25 NOTE — H&P PST ADULT - HISTORY OF PRESENT ILLNESS
66yo male with PMH of HTN here for PST. Pt is a poor historian and forgetful at times. History taken from pt and pt's wife. Pt with right lower leg wound for the last 15 years and was admitted to [facility] in 11/2017 "when the wound was bleeding too much". Pt s/p debridement in 12/2017. Pt still with right lower leg wound with clear yellow drainage. Pt denies pain of right lower leg. Pt does dressing change of wound daily. Pt electing for split thickness skin graft right lower leg, placement of wound vac on 1/29/18.

## 2018-01-26 RX ORDER — SODIUM CHLORIDE 9 MG/ML
1000 INJECTION, SOLUTION INTRAVENOUS
Qty: 0 | Refills: 0 | Status: DISCONTINUED | OUTPATIENT
Start: 2018-01-29 | End: 2018-01-29

## 2018-01-29 ENCOUNTER — TRANSCRIPTION ENCOUNTER (OUTPATIENT)
Age: 68
End: 2018-01-29

## 2018-01-29 ENCOUNTER — OUTPATIENT (OUTPATIENT)
Dept: OUTPATIENT SERVICES | Facility: HOSPITAL | Age: 68
LOS: 1 days | End: 2018-01-29
Payer: COMMERCIAL

## 2018-01-29 VITALS
TEMPERATURE: 98 F | HEIGHT: 73 IN | WEIGHT: 235.89 LBS | HEART RATE: 78 BPM | SYSTOLIC BLOOD PRESSURE: 137 MMHG | DIASTOLIC BLOOD PRESSURE: 90 MMHG | RESPIRATION RATE: 14 BRPM | OXYGEN SATURATION: 99 %

## 2018-01-29 VITALS
HEART RATE: 65 BPM | RESPIRATION RATE: 15 BRPM | OXYGEN SATURATION: 98 % | DIASTOLIC BLOOD PRESSURE: 88 MMHG | SYSTOLIC BLOOD PRESSURE: 127 MMHG

## 2018-01-29 DIAGNOSIS — Z01.818 ENCOUNTER FOR OTHER PREPROCEDURAL EXAMINATION: ICD-10-CM

## 2018-01-29 DIAGNOSIS — L97.901 NON-PRESSURE CHRONIC ULCER OF UNSPECIFIED PART OF UNSPECIFIED LOWER LEG LIMITED TO BREAKDOWN OF SKIN: ICD-10-CM

## 2018-01-29 DIAGNOSIS — S81.801A UNSPECIFIED OPEN WOUND, RIGHT LOWER LEG, INITIAL ENCOUNTER: ICD-10-CM

## 2018-01-29 DIAGNOSIS — Z98.890 OTHER SPECIFIED POSTPROCEDURAL STATES: Chronic | ICD-10-CM

## 2018-01-29 PROCEDURE — 15100 SPLT AGRFT T/A/L 1ST 100SQCM: CPT

## 2018-01-29 PROCEDURE — C1889: CPT

## 2018-01-29 PROCEDURE — 15101 SPLT AGRFT T/A/L EA ADDL 100: CPT

## 2018-01-29 PROCEDURE — 15002 WOUND PREP TRK/ARM/LEG: CPT | Mod: AS

## 2018-01-29 RX ORDER — CEPHALEXIN 500 MG
1 CAPSULE ORAL
Qty: 28 | Refills: 0
Start: 2018-01-29 | End: 2018-02-04

## 2018-01-29 RX ORDER — SODIUM CHLORIDE 9 MG/ML
1000 INJECTION, SOLUTION INTRAVENOUS
Qty: 0 | Refills: 0 | Status: DISCONTINUED | OUTPATIENT
Start: 2018-01-29 | End: 2018-01-30

## 2018-01-29 RX ORDER — HYDROMORPHONE HYDROCHLORIDE 2 MG/ML
0.5 INJECTION INTRAMUSCULAR; INTRAVENOUS; SUBCUTANEOUS
Qty: 0 | Refills: 0 | Status: DISCONTINUED | OUTPATIENT
Start: 2018-01-29 | End: 2018-01-30

## 2018-01-29 RX ORDER — MEPERIDINE HYDROCHLORIDE 50 MG/ML
12.5 INJECTION INTRAMUSCULAR; INTRAVENOUS; SUBCUTANEOUS
Qty: 0 | Refills: 0 | Status: DISCONTINUED | OUTPATIENT
Start: 2018-01-29 | End: 2018-01-30

## 2018-01-29 RX ORDER — CEFAZOLIN SODIUM 1 G
2000 VIAL (EA) INJECTION ONCE
Qty: 0 | Refills: 0 | Status: COMPLETED | OUTPATIENT
Start: 2018-01-29 | End: 2018-01-29

## 2018-01-29 RX ADMIN — SODIUM CHLORIDE 75 MILLILITER(S): 9 INJECTION, SOLUTION INTRAVENOUS at 13:26

## 2018-01-29 NOTE — BRIEF OPERATIVE NOTE - OPERATION/FINDINGS
Uncomplicated procedure. Right posterior distal calf wound debrided, split thickness skin grafted from Right thigh region and placed onto Right calf wound. Wound vac applied.

## 2018-01-29 NOTE — ASU DISCHARGE PLAN (ADULT/PEDIATRIC). - NOTIFY
Unable to Urinate/Persistent Nausea and Vomiting/Inability to Tolerate Liquids or Foods/Bleeding that does not stop/Increased Irritability or Sluggishness/Pain not relieved by Medications/Fever greater than 101

## 2018-01-29 NOTE — ASU DISCHARGE PLAN (ADULT/PEDIATRIC). - =======================================================================
Problem: Goal Outcome Summary  Goal: Goal Outcome Summary  Outcome: Improving  VSS.  Working on breastfeeding and age appropriate voids and stools. On pathway, Continue to monitor and notify MD as needed.       Statement Selected

## 2018-01-29 NOTE — ASU DISCHARGE PLAN (ADULT/PEDIATRIC). - SPECIAL INSTRUCTIONS
Keep Right dressing clean, dry and intact. Re-inforce dressings as needed. Keep Right lower extremity dressing clean, dry and intact and connected to wound vac. You may weight bear as tolerated with cast shoe onto Right lower extremity.

## 2018-01-29 NOTE — ASU PATIENT PROFILE, ADULT - PMH
BPH (benign prostatic hyperplasia)    Chronic anemia    CKD (chronic kidney disease) stage 2, GFR 60-89 ml/min    Glucose intolerance (impaired glucose tolerance)    H/O sickle cell trait    Hypertension    MRSA (methicillin resistant Staphylococcus aureus) infection  inner thigh 3/2016  Poor historian  (Pt forgetful at times)  Unspecified open wound, right lower leg, initial encounter

## 2018-01-29 NOTE — BRIEF OPERATIVE NOTE - PROCEDURE
<<-----Click on this checkbox to enter Procedure Split thickness skingraft  01/29/2018  Split thickness skin graft to Right lower extremity wound - distal calf, from Right thigh (donor site), application of wound vac  Active  NSHAH17  Irrigation and debridement of right lower extremity  01/29/2018    Active  NSHAH17

## 2018-03-19 ENCOUNTER — APPOINTMENT (OUTPATIENT)
Dept: VASCULAR SURGERY | Facility: CLINIC | Age: 68
End: 2018-03-19

## 2018-04-16 ENCOUNTER — APPOINTMENT (OUTPATIENT)
Dept: VASCULAR SURGERY | Facility: CLINIC | Age: 68
End: 2018-04-16
Payer: COMMERCIAL

## 2018-04-16 VITALS
BODY MASS INDEX: 32.08 KG/M2 | WEIGHT: 250 LBS | DIASTOLIC BLOOD PRESSURE: 98 MMHG | HEART RATE: 64 BPM | OXYGEN SATURATION: 96 % | HEIGHT: 74 IN | SYSTOLIC BLOOD PRESSURE: 169 MMHG | TEMPERATURE: 98.2 F

## 2018-04-16 DIAGNOSIS — I87.2 VENOUS INSUFFICIENCY (CHRONIC) (PERIPHERAL): ICD-10-CM

## 2018-04-16 DIAGNOSIS — I83.009 VARICOSE VEINS OF UNSPECIFIED LOWER EXTREMITY WITH ULCER OF UNSPECIFIED SITE: ICD-10-CM

## 2018-04-16 DIAGNOSIS — L97.909 VARICOSE VEINS OF UNSPECIFIED LOWER EXTREMITY WITH ULCER OF UNSPECIFIED SITE: ICD-10-CM

## 2018-04-16 PROCEDURE — 93971 EXTREMITY STUDY: CPT

## 2018-04-16 PROCEDURE — 99203 OFFICE O/P NEW LOW 30 MIN: CPT

## 2018-04-16 RX ORDER — CYCLOBENZAPRINE HYDROCHLORIDE 5 MG/1
5 TABLET, FILM COATED ORAL
Qty: 90 | Refills: 0 | Status: ACTIVE | COMMUNITY
Start: 2017-06-29

## 2018-04-16 RX ORDER — SILVER SULFADIAZINE 10 MG/G
1 CREAM TOPICAL
Qty: 50 | Refills: 0 | Status: ACTIVE | COMMUNITY
Start: 2018-03-06

## 2018-04-16 RX ORDER — MELOXICAM 15 MG/1
15 TABLET ORAL
Qty: 7 | Refills: 0 | Status: ACTIVE | COMMUNITY
Start: 2017-10-07

## 2018-04-16 RX ORDER — HYOSCYAMINE SULFATE 16 OZ
0.25 SOLUTION MISCELLANEOUS
Qty: 473 | Refills: 0 | Status: ACTIVE | COMMUNITY
Start: 2017-12-21

## 2018-04-16 RX ORDER — CEPHALEXIN 500 MG/1
500 CAPSULE ORAL
Qty: 28 | Refills: 0 | Status: ACTIVE | COMMUNITY
Start: 2018-01-29

## 2018-04-16 RX ORDER — METOPROLOL SUCCINATE 50 MG/1
50 TABLET, EXTENDED RELEASE ORAL
Qty: 30 | Refills: 0 | Status: ACTIVE | COMMUNITY
Start: 2017-01-25

## 2018-04-16 RX ORDER — FOLIC ACID 1 MG/1
1 TABLET ORAL
Qty: 30 | Refills: 0 | Status: ACTIVE | COMMUNITY
Start: 2017-06-29

## 2018-04-16 RX ORDER — LOSARTAN POTASSIUM 25 MG/1
25 TABLET, FILM COATED ORAL
Qty: 30 | Refills: 0 | Status: ACTIVE | COMMUNITY
Start: 2017-06-29

## 2018-04-16 RX ORDER — OXYCODONE AND ACETAMINOPHEN 5; 325 MG/1; MG/1
5-325 TABLET ORAL
Qty: 30 | Refills: 0 | Status: ACTIVE | COMMUNITY
Start: 2018-01-29

## 2018-04-16 RX ORDER — TAMSULOSIN HYDROCHLORIDE 0.4 MG/1
0.4 CAPSULE ORAL
Qty: 30 | Refills: 0 | Status: ACTIVE | COMMUNITY
Start: 2017-06-29

## 2018-04-16 RX ORDER — PREDNISONE 20 MG/1
20 TABLET ORAL
Qty: 10 | Refills: 0 | Status: ACTIVE | COMMUNITY
Start: 2017-04-24

## 2018-04-16 RX ORDER — TRIAMCINOLONE ACETONIDE 5 MG/G
0.5 OINTMENT TOPICAL
Qty: 75 | Refills: 0 | Status: ACTIVE | COMMUNITY
Start: 2017-10-14

## 2020-02-25 NOTE — BRIEF OPERATIVE NOTE - SPECIMENS
None Dapsone Pregnancy And Lactation Text: This medication is Pregnancy Category C and is not considered safe during pregnancy or breast feeding.

## 2020-04-10 ENCOUNTER — INPATIENT (INPATIENT)
Facility: HOSPITAL | Age: 70
LOS: 9 days | Discharge: HOME | DRG: 177 | End: 2020-04-20
Attending: HOSPITALIST | Admitting: INTERNAL MEDICINE
Payer: COMMERCIAL

## 2020-04-10 VITALS
RESPIRATION RATE: 20 BRPM | DIASTOLIC BLOOD PRESSURE: 86 MMHG | TEMPERATURE: 99 F | OXYGEN SATURATION: 91 % | WEIGHT: 225.09 LBS | SYSTOLIC BLOOD PRESSURE: 145 MMHG | HEIGHT: 74 IN | HEART RATE: 74 BPM

## 2020-04-10 DIAGNOSIS — Z98.890 OTHER SPECIFIED POSTPROCEDURAL STATES: Chronic | ICD-10-CM

## 2020-04-10 LAB
ALBUMIN SERPL ELPH-MCNC: 3.3 G/DL — SIGNIFICANT CHANGE UP (ref 3.3–5.2)
ALP SERPL-CCNC: 52 U/L — SIGNIFICANT CHANGE UP (ref 40–120)
ALT FLD-CCNC: 8 U/L — SIGNIFICANT CHANGE UP
ANION GAP SERPL CALC-SCNC: 16 MMOL/L — SIGNIFICANT CHANGE UP (ref 5–17)
APTT BLD: 30.1 SEC — SIGNIFICANT CHANGE UP (ref 27.5–36.3)
AST SERPL-CCNC: 26 U/L — SIGNIFICANT CHANGE UP
BASE EXCESS BLDV CALC-SCNC: -2.9 MMOL/L — LOW (ref -2–2)
BASOPHILS # BLD AUTO: 0.01 K/UL — SIGNIFICANT CHANGE UP (ref 0–0.2)
BASOPHILS NFR BLD AUTO: 0.2 % — SIGNIFICANT CHANGE UP (ref 0–2)
BILIRUB SERPL-MCNC: 0.6 MG/DL — SIGNIFICANT CHANGE UP (ref 0.4–2)
BUN SERPL-MCNC: 22 MG/DL — HIGH (ref 8–20)
CA-I SERPL-SCNC: 1.04 MMOL/L — LOW (ref 1.15–1.33)
CALCIUM SERPL-MCNC: 8.7 MG/DL — SIGNIFICANT CHANGE UP (ref 8.6–10.2)
CHLORIDE BLDV-SCNC: 110 MMOL/L — HIGH (ref 98–107)
CHLORIDE SERPL-SCNC: 106 MMOL/L — SIGNIFICANT CHANGE UP (ref 98–107)
CO2 SERPL-SCNC: 20 MMOL/L — LOW (ref 22–29)
CREAT SERPL-MCNC: 1.83 MG/DL — HIGH (ref 0.5–1.3)
CRP SERPL-MCNC: 13.71 MG/DL — HIGH (ref 0–0.4)
D DIMER BLD IA.RAPID-MCNC: 5015 NG/ML DDU — HIGH
EOSINOPHIL # BLD AUTO: 0 K/UL — SIGNIFICANT CHANGE UP (ref 0–0.5)
EOSINOPHIL NFR BLD AUTO: 0 % — SIGNIFICANT CHANGE UP (ref 0–6)
FERRITIN SERPL-MCNC: 860 NG/ML — HIGH (ref 30–400)
GAS PNL BLDV: 144 MMOL/L — SIGNIFICANT CHANGE UP (ref 135–145)
GAS PNL BLDV: SIGNIFICANT CHANGE UP
GAS PNL BLDV: SIGNIFICANT CHANGE UP
GLUCOSE BLDV-MCNC: 132 MG/DL — HIGH (ref 70–99)
GLUCOSE SERPL-MCNC: 138 MG/DL — HIGH (ref 70–99)
HCO3 BLDV-SCNC: 22 MMOL/L — SIGNIFICANT CHANGE UP (ref 21–29)
HCT VFR BLD CALC: 39 % — SIGNIFICANT CHANGE UP (ref 39–50)
HCT VFR BLDA CALC: 40 — SIGNIFICANT CHANGE UP (ref 39–50)
HGB BLD CALC-MCNC: 13.1 G/DL — SIGNIFICANT CHANGE UP (ref 13–17)
HGB BLD-MCNC: 12.7 G/DL — LOW (ref 13–17)
IMM GRANULOCYTES NFR BLD AUTO: 0.9 % — SIGNIFICANT CHANGE UP (ref 0–1.5)
INR BLD: 1.2 RATIO — HIGH (ref 0.88–1.16)
LACTATE BLDV-MCNC: 1.6 MMOL/L — SIGNIFICANT CHANGE UP (ref 0.5–2)
LDH SERPL L TO P-CCNC: 451 U/L — HIGH (ref 98–192)
LYMPHOCYTES # BLD AUTO: 0.51 K/UL — LOW (ref 1–3.3)
LYMPHOCYTES # BLD AUTO: 11.6 % — LOW (ref 13–44)
MCHC RBC-ENTMCNC: 28.2 PG — SIGNIFICANT CHANGE UP (ref 27–34)
MCHC RBC-ENTMCNC: 32.6 GM/DL — SIGNIFICANT CHANGE UP (ref 32–36)
MCV RBC AUTO: 86.5 FL — SIGNIFICANT CHANGE UP (ref 80–100)
MONOCYTES # BLD AUTO: 0.45 K/UL — SIGNIFICANT CHANGE UP (ref 0–0.9)
MONOCYTES NFR BLD AUTO: 10.2 % — SIGNIFICANT CHANGE UP (ref 2–14)
NEUTROPHILS # BLD AUTO: 3.4 K/UL — SIGNIFICANT CHANGE UP (ref 1.8–7.4)
NEUTROPHILS NFR BLD AUTO: 77.1 % — HIGH (ref 43–77)
OTHER CELLS CSF MANUAL: 16 ML/DL — LOW (ref 18–22)
PCO2 BLDV: 35 MMHG — SIGNIFICANT CHANGE UP (ref 35–50)
PH BLDV: 7.39 — SIGNIFICANT CHANGE UP (ref 7.32–7.43)
PLATELET # BLD AUTO: 212 K/UL — SIGNIFICANT CHANGE UP (ref 150–400)
PO2 BLDV: 57 MMHG — HIGH (ref 25–45)
POTASSIUM BLDV-SCNC: 4.2 MMOL/L — SIGNIFICANT CHANGE UP (ref 3.4–4.5)
POTASSIUM SERPL-MCNC: 4.5 MMOL/L — SIGNIFICANT CHANGE UP (ref 3.5–5.3)
POTASSIUM SERPL-SCNC: 4.5 MMOL/L — SIGNIFICANT CHANGE UP (ref 3.5–5.3)
PROCALCITONIN SERPL-MCNC: 0.28 NG/ML — HIGH (ref 0.02–0.1)
PROT SERPL-MCNC: 7.4 G/DL — SIGNIFICANT CHANGE UP (ref 6.6–8.7)
PROTHROM AB SERPL-ACNC: 13.6 SEC — HIGH (ref 10–12.9)
RBC # BLD: 4.51 M/UL — SIGNIFICANT CHANGE UP (ref 4.2–5.8)
RBC # FLD: 12 % — SIGNIFICANT CHANGE UP (ref 10.3–14.5)
SAO2 % BLDV: 89 % — SIGNIFICANT CHANGE UP
SODIUM SERPL-SCNC: 141 MMOL/L — SIGNIFICANT CHANGE UP (ref 135–145)
WBC # BLD: 4.41 K/UL — SIGNIFICANT CHANGE UP (ref 3.8–10.5)
WBC # FLD AUTO: 4.41 K/UL — SIGNIFICANT CHANGE UP (ref 3.8–10.5)

## 2020-04-10 PROCEDURE — 71045 X-RAY EXAM CHEST 1 VIEW: CPT | Mod: 26

## 2020-04-10 PROCEDURE — 99285 EMERGENCY DEPT VISIT HI MDM: CPT

## 2020-04-10 RX ADMIN — Medication 975 MILLIGRAM(S): at 23:15

## 2020-04-10 NOTE — ED PROVIDER NOTE - OBJECTIVE STATEMENT
69yom w/ HTN has had 4-5 days of non-productive cough, generalized weakness, headaches and now shortness of breath. Symptoms gradual onset and worsening. Known sick contact in wife who is COVID+. He endorses minimal SOB at rest but worsens with exertion.

## 2020-04-10 NOTE — ED ADULT TRIAGE NOTE - CHIEF COMPLAINT QUOTE
covid symptoms X 1 week.  Patient's wife is Covid+ and an inpatient here at [facility].  oxygen saturation 91% RA

## 2020-04-10 NOTE — ED PROVIDER NOTE - CLINICAL SUMMARY MEDICAL DECISION MAKING FREE TEXT BOX
Hypoxia at rest, worsens with ambulation to 88%, clinical presentation suggestive of COVID-19 infection, known sick contact in wife. Maintains SpO2 on nasal cannula, work of breathing acceptable and hemodynamically stable but will require admission due to new hypoxia.

## 2020-04-11 DIAGNOSIS — J96.01 ACUTE RESPIRATORY FAILURE WITH HYPOXIA: ICD-10-CM

## 2020-04-11 DIAGNOSIS — I10 ESSENTIAL (PRIMARY) HYPERTENSION: ICD-10-CM

## 2020-04-11 DIAGNOSIS — N40.0 BENIGN PROSTATIC HYPERPLASIA WITHOUT LOWER URINARY TRACT SYMPTOMS: ICD-10-CM

## 2020-04-11 PROBLEM — Z86.2 PERSONAL HISTORY OF DISEASES OF THE BLOOD AND BLOOD-FORMING ORGANS AND CERTAIN DISORDERS INVOLVING THE IMMUNE MECHANISM: Chronic | Status: ACTIVE | Noted: 2018-01-25

## 2020-04-11 PROBLEM — N18.2 CHRONIC KIDNEY DISEASE, STAGE 2 (MILD): Chronic | Status: ACTIVE | Noted: 2017-11-27

## 2020-04-11 PROBLEM — R73.02 IMPAIRED GLUCOSE TOLERANCE (ORAL): Chronic | Status: ACTIVE | Noted: 2017-11-27

## 2020-04-11 PROBLEM — D64.9 ANEMIA, UNSPECIFIED: Chronic | Status: ACTIVE | Noted: 2017-11-27

## 2020-04-11 PROBLEM — Z78.9 OTHER SPECIFIED HEALTH STATUS: Chronic | Status: ACTIVE | Noted: 2018-01-25

## 2020-04-11 PROBLEM — S81.801A UNSPECIFIED OPEN WOUND, RIGHT LOWER LEG, INITIAL ENCOUNTER: Chronic | Status: ACTIVE | Noted: 2018-01-25

## 2020-04-11 LAB — SARS-COV-2 RNA SPEC QL NAA+PROBE: DETECTED

## 2020-04-11 PROCEDURE — 99223 1ST HOSP IP/OBS HIGH 75: CPT

## 2020-04-11 PROCEDURE — 12345: CPT | Mod: NC

## 2020-04-11 PROCEDURE — 93010 ELECTROCARDIOGRAM REPORT: CPT

## 2020-04-11 RX ORDER — CHOLECALCIFEROL (VITAMIN D3) 125 MCG
1 CAPSULE ORAL
Qty: 0 | Refills: 0 | DISCHARGE

## 2020-04-11 RX ORDER — METOPROLOL TARTRATE 50 MG
12.5 TABLET ORAL
Refills: 0 | Status: DISCONTINUED | OUTPATIENT
Start: 2020-04-11 | End: 2020-04-20

## 2020-04-11 RX ORDER — ENOXAPARIN SODIUM 100 MG/ML
40 INJECTION SUBCUTANEOUS EVERY 12 HOURS
Refills: 0 | Status: DISCONTINUED | OUTPATIENT
Start: 2020-04-11 | End: 2020-04-20

## 2020-04-11 RX ORDER — ACETAMINOPHEN 500 MG
975 TABLET ORAL ONCE
Refills: 0 | Status: COMPLETED | OUTPATIENT
Start: 2020-04-11 | End: 2020-04-10

## 2020-04-11 RX ORDER — HYDROXYCHLOROQUINE SULFATE 200 MG
400 TABLET ORAL EVERY 24 HOURS
Refills: 0 | Status: COMPLETED | OUTPATIENT
Start: 2020-04-12 | End: 2020-04-15

## 2020-04-11 RX ORDER — ACETAMINOPHEN 500 MG
650 TABLET ORAL EVERY 4 HOURS
Refills: 0 | Status: DISCONTINUED | OUTPATIENT
Start: 2020-04-11 | End: 2020-04-20

## 2020-04-11 RX ORDER — HYDROXYCHLOROQUINE SULFATE 200 MG
TABLET ORAL
Refills: 0 | Status: COMPLETED | OUTPATIENT
Start: 2020-04-11 | End: 2020-04-15

## 2020-04-11 RX ORDER — FOLIC ACID 0.8 MG
1 TABLET ORAL
Qty: 0 | Refills: 0 | DISCHARGE

## 2020-04-11 RX ORDER — ALBUTEROL 90 UG/1
2 AEROSOL, METERED ORAL EVERY 4 HOURS
Refills: 0 | Status: DISCONTINUED | OUTPATIENT
Start: 2020-04-11 | End: 2020-04-20

## 2020-04-11 RX ORDER — HYDROXYCHLOROQUINE SULFATE 200 MG
800 TABLET ORAL EVERY 24 HOURS
Refills: 0 | Status: COMPLETED | OUTPATIENT
Start: 2020-04-11 | End: 2020-04-11

## 2020-04-11 RX ORDER — TAMSULOSIN HYDROCHLORIDE 0.4 MG/1
1 CAPSULE ORAL
Qty: 0 | Refills: 0 | DISCHARGE

## 2020-04-11 RX ORDER — ASPIRIN/CALCIUM CARB/MAGNESIUM 324 MG
1 TABLET ORAL
Qty: 0 | Refills: 0 | DISCHARGE

## 2020-04-11 RX ORDER — FOLIC ACID 0.8 MG
1 TABLET ORAL DAILY
Refills: 0 | Status: DISCONTINUED | OUTPATIENT
Start: 2020-04-11 | End: 2020-04-20

## 2020-04-11 RX ORDER — TAMSULOSIN HYDROCHLORIDE 0.4 MG/1
0.4 CAPSULE ORAL AT BEDTIME
Refills: 0 | Status: DISCONTINUED | OUTPATIENT
Start: 2020-04-11 | End: 2020-04-20

## 2020-04-11 RX ADMIN — Medication 50 MILLIGRAM(S): at 17:26

## 2020-04-11 RX ADMIN — Medication 50 MILLIGRAM(S): at 06:53

## 2020-04-11 RX ADMIN — ENOXAPARIN SODIUM 40 MILLIGRAM(S): 100 INJECTION SUBCUTANEOUS at 06:52

## 2020-04-11 RX ADMIN — Medication 800 MILLIGRAM(S): at 06:52

## 2020-04-11 RX ADMIN — Medication 12.5 MILLIGRAM(S): at 17:27

## 2020-04-11 NOTE — ED ADULT NURSE NOTE - OBJECTIVE STATEMENT
covid symptoms X 1 week.  Patient's wife is Covid+ and an inpatient here at [facility].  oxygen saturation 91% RA

## 2020-04-11 NOTE — H&P ADULT - NSHPREVIEWOFSYSTEMS_GEN_ALL_CORE
REVIEW OF SYSTEMS:    CONSTITUTIONAL: see HPI  EYES/ENT: No visual changes;  No vertigo or throat pain   NECK: No pain or stiffness  RESPIRATORY: see HPI  CARDIOVASCULAR: No chest pain or palpitations  GASTROINTESTINAL: No abdominal or epigastric pain. No nausea, vomiting, or hematemesis; No diarrhea or constipation. No melena or hematochezia.  GENITOURINARY: No dysuria, frequency or hematuria  NEUROLOGICAL: No numbness or weakness  SKIN: No itching, burning, rashes, or lesions   All other review of systems is negative unless indicated above.

## 2020-04-11 NOTE — ED ADULT NURSE NOTE - CHIEF COMPLAINT QUOTE
covid symptoms X 1 week.  Patient's wife is Covid+ and an inpatient here at [facility].  oxygen saturation 91% RA

## 2020-04-11 NOTE — PROGRESS NOTE ADULT - ASSESSMENT
69 yr old male with hypertension, diabetes mellitus, CKD, BPH admitted with complaints of worsening shortness of breath, fever, body aches. He hypoxic in ED, requiring supplemental oxygen. COVID 19 testing was sent. Empirically started on steroids and Plaquenil on admission. He was also noted to have SCAR.

## 2020-04-11 NOTE — H&P ADULT - NSICDXPASTMEDICALHX_GEN_ALL_CORE_FT
PAST MEDICAL HISTORY:  BPH (benign prostatic hyperplasia)     Chronic anemia     CKD (chronic kidney disease) stage 2, GFR 60-89 ml/min     Glucose intolerance (impaired glucose tolerance)     H/O sickle cell trait     Hypertension     MRSA (methicillin resistant Staphylococcus aureus) infection inner thigh 3/2016    Poor historian (Pt forgetful at times)    Unspecified open wound, right lower leg, initial encounter

## 2020-04-11 NOTE — H&P ADULT - NSHPPHYSICALEXAM_GEN_ALL_CORE
Vital Signs Last 24 Hrs  T(C): 38.4 (10 Apr 2020 23:55), Max: 38.4 (10 Apr 2020 23:55)  T(F): 101.1 (10 Apr 2020 23:55), Max: 101.1 (10 Apr 2020 23:55)  HR: 66 (10 Apr 2020 23:55) (66 - 74)  BP: 125/82 (10 Apr 2020 23:55) (125/82 - 145/86)  BP(mean): --  RR: 18 (10 Apr 2020 23:55) (18 - 20)  SpO2: 97% (10 Apr 2020 23:55) (91% - 97%)    General: NAD, breathing comfortably.

## 2020-04-11 NOTE — PROGRESS NOTE ADULT - SUBJECTIVE AND OBJECTIVE BOX
INTERVAL HPI/OVERNIGHT EVENTS:    CC:    Vital Signs Last 24 Hrs  T(C): 37.5 (11 Apr 2020 06:47), Max: 38.4 (10 Apr 2020 23:55)  T(F): 99.5 (11 Apr 2020 06:47), Max: 101.1 (10 Apr 2020 23:55)  HR: 54 (11 Apr 2020 06:47) (54 - 74)  BP: 128/72 (11 Apr 2020 06:47) (125/82 - 145/86)  BP(mean): --  RR: 18 (11 Apr 2020 06:47) (18 - 20)  SpO2: 96% (11 Apr 2020 06:47) (91% - 97%)    PHYSICAL EXAM:    GENERAL:   CHEST/LUNG:   HEART:   ABDOMEN:   EXTREMITIES:      MEDICATIONS  (STANDING):  enoxaparin Injectable 40 milliGRAM(s) SubCutaneous every 12 hours  folic acid 1 milliGRAM(s) Oral daily  hydroxychloroquine   Oral   methylPREDNISolone sodium succinate Injectable 50 milliGRAM(s) IV Push two times a day  metoprolol tartrate 12.5 milliGRAM(s) Oral two times a day  tamsulosin 0.4 milliGRAM(s) Oral at bedtime    MEDICATIONS  (PRN):  acetaminophen   Tablet .. 650 milliGRAM(s) Oral every 4 hours PRN Temp greater or equal to 38.5C (101.3F)  ALBUTerol    90 MICROgram(s) HFA Inhaler 2 Puff(s) Inhalation every 4 hours PRN Shortness of Breath and/or Wheezing  benzonatate 100 milliGRAM(s) Oral three times a day PRN Cough      Allergies    No Known Allergies    Intolerances          LABS:                          12.7   4.41  )-----------( 212      ( 10 Apr 2020 23:04 )             39.0     04-10    141  |  106  |  22.0<H>  ----------------------------<  138<H>  4.5   |  20.0<L>  |  1.83<H>    Ca    8.7      10 Apr 2020 23:04    TPro  7.4  /  Alb  3.3  /  TBili  0.6  /  DBili  x   /  AST  26  /  ALT  8   /  AlkPhos  52  04-10    PT/INR - ( 10 Apr 2020 23:04 )   PT: 13.6 sec;   INR: 1.20 ratio         PTT - ( 10 Apr 2020 23:04 )  PTT:30.1 sec      RADIOLOGY & ADDITIONAL TESTS: INTERVAL HPI/OVERNIGHT EVENTS:    CC: acute hypoxic respiratory failure sec COVID 19, hypertension, BPH, diabetes mellitus      Chart and course reviewed. States he feels better since being placed on nasal cannula.    Vital Signs Last 24 Hrs  T(C): 37.5 (11 Apr 2020 06:47), Max: 38.4 (10 Apr 2020 23:55)  T(F): 99.5 (11 Apr 2020 06:47), Max: 101.1 (10 Apr 2020 23:55)  HR: 54 (11 Apr 2020 06:47) (54 - 74)  BP: 128/72 (11 Apr 2020 06:47) (125/82 - 145/86)  BP(mean): --  RR: 18 (11 Apr 2020 06:47) (18 - 20)  SpO2: 96% (11 Apr 2020 06:47) (91% - 97%)    PHYSICAL EXAM:    GENERAL: alert, not in distress, speaking in full sentences  CHEST/LUNG:   HEART:   ABDOMEN:   EXTREMITIES:      MEDICATIONS  (STANDING):  enoxaparin Injectable 40 milliGRAM(s) SubCutaneous every 12 hours  folic acid 1 milliGRAM(s) Oral daily  hydroxychloroquine   Oral   methylPREDNISolone sodium succinate Injectable 50 milliGRAM(s) IV Push two times a day  metoprolol tartrate 12.5 milliGRAM(s) Oral two times a day  tamsulosin 0.4 milliGRAM(s) Oral at bedtime    MEDICATIONS  (PRN):  acetaminophen   Tablet .. 650 milliGRAM(s) Oral every 4 hours PRN Temp greater or equal to 38.5C (101.3F)  ALBUTerol    90 MICROgram(s) HFA Inhaler 2 Puff(s) Inhalation every 4 hours PRN Shortness of Breath and/or Wheezing  benzonatate 100 milliGRAM(s) Oral three times a day PRN Cough      Allergies    No Known Allergies    Intolerances          LABS:                          12.7   4.41  )-----------( 212      ( 10 Apr 2020 23:04 )             39.0     04-10    141  |  106  |  22.0<H>  ----------------------------<  138<H>  4.5   |  20.0<L>  |  1.83<H>    Ca    8.7      10 Apr 2020 23:04    TPro  7.4  /  Alb  3.3  /  TBili  0.6  /  DBili  x   /  AST  26  /  ALT  8   /  AlkPhos  52  04-10    PT/INR - ( 10 Apr 2020 23:04 )   PT: 13.6 sec;   INR: 1.20 ratio         PTT - ( 10 Apr 2020 23:04 )  PTT:30.1 sec      RADIOLOGY & ADDITIONAL TESTS: INTERVAL HPI/OVERNIGHT EVENTS:    CC: acute hypoxic respiratory failure sec COVID 19, hypertension, BPH, diabetes mellitus      Chart and course reviewed. States he feels better since being placed on nasal cannula. His wife is currently admitted with similar symptoms.    Vital Signs Last 24 Hrs  T(C): 37.5 (11 Apr 2020 06:47), Max: 38.4 (10 Apr 2020 23:55)  T(F): 99.5 (11 Apr 2020 06:47), Max: 101.1 (10 Apr 2020 23:55)  HR: 54 (11 Apr 2020 06:47) (54 - 74)  BP: 128/72 (11 Apr 2020 06:47) (125/82 - 145/86)  BP(mean): --  RR: 18 (11 Apr 2020 06:47) (18 - 20)  SpO2: 96% (11 Apr 2020 06:47) (91% - 97%)    PHYSICAL EXAM:    GENERAL: alert, not in distress, speaking in full sentences  CHEST/LUNG: b/l air entry, coarse  HEART: regular  ABDOMEN: soft, bs+  EXTREMITIES:  no edema, tenderness    MEDICATIONS  (STANDING):  enoxaparin Injectable 40 milliGRAM(s) SubCutaneous every 12 hours  folic acid 1 milliGRAM(s) Oral daily  hydroxychloroquine   Oral   methylPREDNISolone sodium succinate Injectable 50 milliGRAM(s) IV Push two times a day  metoprolol tartrate 12.5 milliGRAM(s) Oral two times a day  tamsulosin 0.4 milliGRAM(s) Oral at bedtime    MEDICATIONS  (PRN):  acetaminophen   Tablet .. 650 milliGRAM(s) Oral every 4 hours PRN Temp greater or equal to 38.5C (101.3F)  ALBUTerol    90 MICROgram(s) HFA Inhaler 2 Puff(s) Inhalation every 4 hours PRN Shortness of Breath and/or Wheezing  benzonatate 100 milliGRAM(s) Oral three times a day PRN Cough      Allergies    No Known Allergies    Intolerances          LABS:                          12.7   4.41  )-----------( 212      ( 10 Apr 2020 23:04 )             39.0     04-10    141  |  106  |  22.0<H>  ----------------------------<  138<H>  4.5   |  20.0<L>  |  1.83<H>    Ca    8.7      10 Apr 2020 23:04    TPro  7.4  /  Alb  3.3  /  TBili  0.6  /  DBili  x   /  AST  26  /  ALT  8   /  AlkPhos  52  04-10    PT/INR - ( 10 Apr 2020 23:04 )   PT: 13.6 sec;   INR: 1.20 ratio         PTT - ( 10 Apr 2020 23:04 )  PTT:30.1 sec      RADIOLOGY & ADDITIONAL TESTS:

## 2020-04-11 NOTE — H&P ADULT - NSHPLABSRESULTS_GEN_ALL_CORE
12.7   4.41  )-----------( 212      ( 10 Apr 2020 23:04 )             39.0       04-10    141  |  106  |  22.0<H>  ----------------------------<  138<H>  4.5   |  20.0<L>  |  1.83<H>    Ca    8.7      10 Apr 2020 23:04    TPro  7.4  /  Alb  3.3  /  TBili  0.6  /  DBili  x   /  AST  26  /  ALT  8   /  AlkPhos  52  04-10            PT/INR - ( 10 Apr 2020 23:04 )   PT: 13.6 sec;   INR: 1.20 ratio         PTT - ( 10 Apr 2020 23:04 )  PTT:30.1 sec    Lactate Trend      CAPILLARY BLOOD GLUCOSE      RADIOLOGY, EKG & ADDITIONAL TESTS: Reviewed.     EKG- NSR,     < from: Xray Chest 1 View AP/PA. (04.10.20 @ 22:59) >    Findings:    The cardiac silhouette is unremarkable. Extensive patchy airspace opacities  are noted bilaterally. There is no pleural effusion or pneumothorax.    IMPRESSION:    Bilateral airspace opacities suspicious for pneumonia.    < end of copied text >

## 2020-04-11 NOTE — ED ADULT NURSE NOTE - NSIMPLEMENTINTERV_GEN_ALL_ED
Implemented All Fall Risk Interventions:  [place] to call system. Call bell, personal items and telephone within reach. Instruct patient to call for assistance. Room bathroom lighting operational. Non-slip footwear when patient is off stretcher. Physically safe environment: no spills, clutter or unnecessary equipment. Stretcher in lowest position, wheels locked, appropriate side rails in place. Provide visual cue, wrist band, yellow gown, etc. Monitor gait and stability. Monitor for mental status changes and reorient to person, place, and time. Review medications for side effects contributing to fall risk. Reinforce activity limits and safety measures with patient and family.

## 2020-04-11 NOTE — H&P ADULT - HISTORY OF PRESENT ILLNESS
69M hx BPH, CKD, DM2 (diet controlled), HTN presents with worsening shortness of breath. Patient states started feeling unwell about 10 days ago. He was having fatigue, headache, fevers, body aches and shortness of breath. His wife has also been feeling unwell and was admitted here yesterday evening. He kids were concerned that he wasn't getting better either and shortness of breath was worsening so they convinced him to come in. Of note patient states that he hasn't been been taking any of his home medications for the last few weeks due to covid.     In ED, pt febrile to 101.1, P- 66, bp 125/82 and spo2 down to 90 on room air at rest. Improved to 97% on 2L.

## 2020-04-11 NOTE — H&P ADULT - ASSESSMENT
69M hx BPH, CKD, DM2 (diet controlled), HTN presents with worsening shortness of breath found to be hypoxic likely 2/2 covid19.      #Hypoxia  -mild, doing well on 2L nc  -will start plaquenil, f/u covid swab  -start solu-medrol 50 bid  -albuterol prn  -monitor pulse ox Q4  -strict i's and o's    #suspect novel covid19 pna  -f/u swab  -low suspicion for superimposed bacterial pneumonia  -c/w plaquenil and steroids as above    #SCAR on CKD  -baseline around 1.1, now 1.83  -likely 2/2 covid19, possibly urinary retention, will get bladder scan  -continue to trend  -check urinalysis  -avoid nephrotoxic medications    #HTN  -pt off home medications, previously on ARB, but will hold due to scar  -will start metoprolol at lower dose. 12.5 bid  -adjust as needed    #bph  -c/w tamuslosin

## 2020-04-12 DIAGNOSIS — N17.9 ACUTE KIDNEY FAILURE, UNSPECIFIED: ICD-10-CM

## 2020-04-12 LAB
ALBUMIN SERPL ELPH-MCNC: 3.1 G/DL — LOW (ref 3.3–5.2)
ALP SERPL-CCNC: 53 U/L — SIGNIFICANT CHANGE UP (ref 40–120)
ALT FLD-CCNC: 10 U/L — SIGNIFICANT CHANGE UP
ANION GAP SERPL CALC-SCNC: 20 MMOL/L — HIGH (ref 5–17)
AST SERPL-CCNC: 22 U/L — SIGNIFICANT CHANGE UP
BILIRUB SERPL-MCNC: 0.4 MG/DL — SIGNIFICANT CHANGE UP (ref 0.4–2)
BUN SERPL-MCNC: 56 MG/DL — HIGH (ref 8–20)
CALCIUM SERPL-MCNC: 8.7 MG/DL — SIGNIFICANT CHANGE UP (ref 8.6–10.2)
CHLORIDE SERPL-SCNC: 99 MMOL/L — SIGNIFICANT CHANGE UP (ref 98–107)
CO2 SERPL-SCNC: 16 MMOL/L — LOW (ref 22–29)
CREAT SERPL-MCNC: 3.81 MG/DL — HIGH (ref 0.5–1.3)
CRP SERPL-MCNC: 7.21 MG/DL — HIGH (ref 0–0.4)
FERRITIN SERPL-MCNC: 927 NG/ML — HIGH (ref 30–400)
GLUCOSE SERPL-MCNC: 200 MG/DL — HIGH (ref 70–99)
HCT VFR BLD CALC: 38.4 % — LOW (ref 39–50)
HGB BLD-MCNC: 12.7 G/DL — LOW (ref 13–17)
LDH SERPL L TO P-CCNC: 506 U/L — HIGH (ref 98–192)
MAGNESIUM SERPL-MCNC: 2 MG/DL — SIGNIFICANT CHANGE UP (ref 1.8–2.6)
MCHC RBC-ENTMCNC: 28.2 PG — SIGNIFICANT CHANGE UP (ref 27–34)
MCHC RBC-ENTMCNC: 33.1 GM/DL — SIGNIFICANT CHANGE UP (ref 32–36)
MCV RBC AUTO: 85.1 FL — SIGNIFICANT CHANGE UP (ref 80–100)
PHOSPHATE SERPL-MCNC: 3.2 MG/DL — SIGNIFICANT CHANGE UP (ref 2.4–4.7)
PLATELET # BLD AUTO: 199 K/UL — SIGNIFICANT CHANGE UP (ref 150–400)
POTASSIUM SERPL-MCNC: 4.9 MMOL/L — SIGNIFICANT CHANGE UP (ref 3.5–5.3)
POTASSIUM SERPL-SCNC: 4.9 MMOL/L — SIGNIFICANT CHANGE UP (ref 3.5–5.3)
PROT SERPL-MCNC: 7 G/DL — SIGNIFICANT CHANGE UP (ref 6.6–8.7)
RBC # BLD: 4.51 M/UL — SIGNIFICANT CHANGE UP (ref 4.2–5.8)
RBC # FLD: 12.1 % — SIGNIFICANT CHANGE UP (ref 10.3–14.5)
SODIUM SERPL-SCNC: 135 MMOL/L — SIGNIFICANT CHANGE UP (ref 135–145)
WBC # BLD: 6.06 K/UL — SIGNIFICANT CHANGE UP (ref 3.8–10.5)
WBC # FLD AUTO: 6.06 K/UL — SIGNIFICANT CHANGE UP (ref 3.8–10.5)

## 2020-04-12 PROCEDURE — 99233 SBSQ HOSP IP/OBS HIGH 50: CPT

## 2020-04-12 PROCEDURE — 93010 ELECTROCARDIOGRAM REPORT: CPT

## 2020-04-12 RX ADMIN — ENOXAPARIN SODIUM 40 MILLIGRAM(S): 100 INJECTION SUBCUTANEOUS at 00:09

## 2020-04-12 RX ADMIN — Medication 50 MILLIGRAM(S): at 14:50

## 2020-04-12 RX ADMIN — ENOXAPARIN SODIUM 40 MILLIGRAM(S): 100 INJECTION SUBCUTANEOUS at 14:48

## 2020-04-12 RX ADMIN — TAMSULOSIN HYDROCHLORIDE 0.4 MILLIGRAM(S): 0.4 CAPSULE ORAL at 21:22

## 2020-04-12 RX ADMIN — ENOXAPARIN SODIUM 40 MILLIGRAM(S): 100 INJECTION SUBCUTANEOUS at 09:28

## 2020-04-12 RX ADMIN — Medication 1 MILLIGRAM(S): at 09:28

## 2020-04-12 RX ADMIN — Medication 400 MILLIGRAM(S): at 06:51

## 2020-04-12 RX ADMIN — TAMSULOSIN HYDROCHLORIDE 0.4 MILLIGRAM(S): 0.4 CAPSULE ORAL at 00:10

## 2020-04-12 RX ADMIN — Medication 50 MILLIGRAM(S): at 06:51

## 2020-04-12 RX ADMIN — Medication 12.5 MILLIGRAM(S): at 14:48

## 2020-04-12 NOTE — PROGRESS NOTE ADULT - SUBJECTIVE AND OBJECTIVE BOX
INTERVAL HPI/OVERNIGHT EVENTS:    CC: acute hypoxic respiratory failure sec COVID 19, hypertension, BPH, diabetes mellitus    No overnight events, denies complaints.     Vital Signs Last 24 Hrs  T(C): 37.1 (12 Apr 2020 08:10), Max: 37.1 (12 Apr 2020 08:10)  T(F): 98.7 (12 Apr 2020 08:10), Max: 98.7 (12 Apr 2020 08:10)  HR: 55 (12 Apr 2020 08:10) (55 - 56)  BP: 130/80 (12 Apr 2020 08:10) (113/70 - 132/67)  BP(mean): --  RR: 22 (12 Apr 2020 08:10) (20 - 22)  SpO2: 91% (12 Apr 2020 08:10) (91% - 96%)    PHYSICAL EXAM:    GENERAL: alert, speaking in full sentences, not in distress  CHEST/LUNG: b/l air entry  HEART: regular  ABDOMEN: soft, bs+  EXTREMITIES:  no edema, tenderness    MEDICATIONS  (STANDING):  enoxaparin Injectable 40 milliGRAM(s) SubCutaneous every 12 hours  folic acid 1 milliGRAM(s) Oral daily  hydroxychloroquine 400 milliGRAM(s) Oral every 24 hours  hydroxychloroquine   Oral   methylPREDNISolone sodium succinate Injectable 50 milliGRAM(s) IV Push two times a day  metoprolol tartrate 12.5 milliGRAM(s) Oral two times a day  tamsulosin 0.4 milliGRAM(s) Oral at bedtime    MEDICATIONS  (PRN):  acetaminophen   Tablet .. 650 milliGRAM(s) Oral every 4 hours PRN Temp greater or equal to 38.5C (101.3F)  ALBUTerol    90 MICROgram(s) HFA Inhaler 2 Puff(s) Inhalation every 4 hours PRN Shortness of Breath and/or Wheezing  benzonatate 100 milliGRAM(s) Oral three times a day PRN Cough      Allergies    No Known Allergies    Intolerances          LABS:                          12.7   6.06  )-----------( 199      ( 12 Apr 2020 16:26 )             38.4     04-10    141  |  106  |  22.0<H>  ----------------------------<  138<H>  4.5   |  20.0<L>  |  1.83<H>    Ca    8.7      10 Apr 2020 23:04    TPro  7.4  /  Alb  3.3  /  TBili  0.6  /  DBili  x   /  AST  26  /  ALT  8   /  AlkPhos  52  04-10    PT/INR - ( 10 Apr 2020 23:04 )   PT: 13.6 sec;   INR: 1.20 ratio         PTT - ( 10 Apr 2020 23:04 )  PTT:30.1 sec      RADIOLOGY & ADDITIONAL TESTS:

## 2020-04-13 LAB
ANION GAP SERPL CALC-SCNC: 20 MMOL/L — HIGH (ref 5–17)
BUN SERPL-MCNC: 77 MG/DL — HIGH (ref 8–20)
CALCIUM SERPL-MCNC: 8.4 MG/DL — LOW (ref 8.6–10.2)
CHLORIDE SERPL-SCNC: 98 MMOL/L — SIGNIFICANT CHANGE UP (ref 98–107)
CO2 SERPL-SCNC: 17 MMOL/L — LOW (ref 22–29)
CREAT SERPL-MCNC: 4.19 MG/DL — HIGH (ref 0.5–1.3)
CRP SERPL-MCNC: 4.39 MG/DL — HIGH (ref 0–0.4)
FERRITIN SERPL-MCNC: 827 NG/ML — HIGH (ref 30–400)
GLUCOSE SERPL-MCNC: 209 MG/DL — HIGH (ref 70–99)
HCV AB S/CO SERPL IA: 0.18 S/CO — SIGNIFICANT CHANGE UP (ref 0–0.99)
HCV AB SERPL-IMP: SIGNIFICANT CHANGE UP
LDH SERPL L TO P-CCNC: 395 U/L — HIGH (ref 98–192)
MAGNESIUM SERPL-MCNC: 2.1 MG/DL — SIGNIFICANT CHANGE UP (ref 1.6–2.6)
POTASSIUM SERPL-MCNC: 4.7 MMOL/L — SIGNIFICANT CHANGE UP (ref 3.5–5.3)
POTASSIUM SERPL-SCNC: 4.7 MMOL/L — SIGNIFICANT CHANGE UP (ref 3.5–5.3)
SODIUM SERPL-SCNC: 135 MMOL/L — SIGNIFICANT CHANGE UP (ref 135–145)

## 2020-04-13 PROCEDURE — 76770 US EXAM ABDO BACK WALL COMP: CPT | Mod: 26

## 2020-04-13 PROCEDURE — 99223 1ST HOSP IP/OBS HIGH 75: CPT

## 2020-04-13 PROCEDURE — 99233 SBSQ HOSP IP/OBS HIGH 50: CPT

## 2020-04-13 PROCEDURE — 76775 US EXAM ABDO BACK WALL LIM: CPT | Mod: 26

## 2020-04-13 RX ORDER — SODIUM BICARBONATE 1 MEQ/ML
0.06 SYRINGE (ML) INTRAVENOUS
Qty: 75 | Refills: 0 | Status: DISCONTINUED | OUTPATIENT
Start: 2020-04-13 | End: 2020-04-16

## 2020-04-13 RX ADMIN — Medication 12.5 MILLIGRAM(S): at 17:36

## 2020-04-13 RX ADMIN — TAMSULOSIN HYDROCHLORIDE 0.4 MILLIGRAM(S): 0.4 CAPSULE ORAL at 20:53

## 2020-04-13 RX ADMIN — Medication 50 MILLIGRAM(S): at 05:39

## 2020-04-13 RX ADMIN — Medication 400 MILLIGRAM(S): at 05:39

## 2020-04-13 RX ADMIN — Medication 50 MILLIGRAM(S): at 17:36

## 2020-04-13 RX ADMIN — ENOXAPARIN SODIUM 40 MILLIGRAM(S): 100 INJECTION SUBCUTANEOUS at 05:39

## 2020-04-13 RX ADMIN — Medication 1 MILLIGRAM(S): at 17:36

## 2020-04-13 RX ADMIN — Medication 75 MEQ/KG/HR: at 23:37

## 2020-04-13 RX ADMIN — ENOXAPARIN SODIUM 40 MILLIGRAM(S): 100 INJECTION SUBCUTANEOUS at 17:35

## 2020-04-13 NOTE — CONSULT NOTE ADULT - ASSESSMENT
1) SCAR on CKD  2)ARDs/ COVID 19+  3) Metabolic acidosis    Pt with SCAR likely hemodynamic ATN superimposed on CKD  obtain UA, urine lytes  Start bicarb gtt  Monitor Scr, lytes, UOP.  Will follow

## 2020-04-13 NOTE — PROGRESS NOTE ADULT - SUBJECTIVE AND OBJECTIVE BOX
Nephro consult called for Dr. Rivers d/t SCAR.  Cr on admission 4/10/20 was 1.83  Today 4/13 Cr 4.19    -bladder scan post void residual ordered INTERVAL HPI/OVERNIGHT EVENTS:    CC: acute hypoxic respiratory failure sec COVID 19, hypertension, BPH, diabetes mellitus, SCAR      No overnight events, denies worsening shortness of breath. No cough, abdominal pain.        Vital Signs Last 24 Hrs  T(C): 36.6 (13 Apr 2020 09:50), Max: 36.9 (12 Apr 2020 21:18)  T(F): 97.9 (13 Apr 2020 09:50), Max: 98.4 (12 Apr 2020 21:18)  HR: 58 (13 Apr 2020 09:50) (57 - 60)  BP: 137/76 (13 Apr 2020 09:50) (129/74 - 149/78)  BP(mean): --  RR: 20 (13 Apr 2020 09:50) (19 - 20)  SpO2: 94% (13 Apr 2020 09:50) (92% - 94%)    PHYSICAL EXAM:    GENERAL: alert, not in distress  CHEST/LUNG: b/l air entry, coarse  HEART: regular  ABDOMEN: soft, bs+, non tender  EXTREMITIES:  no edema, tenderness    MEDICATIONS  (STANDING):  enoxaparin Injectable 40 milliGRAM(s) SubCutaneous every 12 hours  folic acid 1 milliGRAM(s) Oral daily  hydroxychloroquine 400 milliGRAM(s) Oral every 24 hours  hydroxychloroquine   Oral   methylPREDNISolone sodium succinate Injectable 50 milliGRAM(s) IV Push two times a day  metoprolol tartrate 12.5 milliGRAM(s) Oral two times a day  tamsulosin 0.4 milliGRAM(s) Oral at bedtime    MEDICATIONS  (PRN):  acetaminophen   Tablet .. 650 milliGRAM(s) Oral every 4 hours PRN Temp greater or equal to 38.5C (101.3F)  ALBUTerol    90 MICROgram(s) HFA Inhaler 2 Puff(s) Inhalation every 4 hours PRN Shortness of Breath and/or Wheezing  benzonatate 100 milliGRAM(s) Oral three times a day PRN Cough      Allergies    No Known Allergies    Intolerances          LABS:                          12.7   6.06  )-----------( 199      ( 12 Apr 2020 16:26 )             38.4     04-13    135  |  98  |  77.0<H>  ----------------------------<  209<H>  4.7   |  17.0<L>  |  4.19<H>    Ca    8.4<L>      13 Apr 2020 10:42  Phos  3.2     04-12  Mg     2.1     04-13    TPro  7.0  /  Alb  3.1<L>  /  TBili  0.4  /  DBili  x   /  AST  22  /  ALT  10  /  AlkPhos  53  04-12          RADIOLOGY & ADDITIONAL TESTS:

## 2020-04-13 NOTE — PROGRESS NOTE ADULT - ASSESSMENT
69 yr old male with hypertension, diabetes mellitus, CKD, BPH admitted with complaints of worsening shortness of breath, fever, body aches. He hypoxic in ED, requiring supplemental oxygen. COVID 19 testing was sent. Empirically started on steroids and Plaquenil on admission. He was also noted to have SCAR. Given worsening renal function, nephrology was consulted, bladder scan was ordered.

## 2020-04-13 NOTE — CONSULT NOTE ADULT - SUBJECTIVE AND OBJECTIVE BOX
[facility] DIVISION OF KIDNEY DISEASES AND HYPERTENSION -- INITIAL CONSULT NOTE  --------------------------------------------------------------------------------  HPI:  '69M hx BPH, CKD, DM2 (diet controlled), HTN presents with worsening shortness of breath. Patient states started feeling unwell about 10 days ago. He was having fatigue, headache, fevers, body aches and shortness of breath. His wife has also been feeling unwell and was admitted here yesterday evening. He kids were concerned that he wasn't getting better either and shortness of breath was worsening so they convinced him to come in. Of note patient states that he hasn't been been taking any of his home medications for the last few weeks due to covid. In ED, pt febrile to 101.1, P- 66, bp 125/82 and spo2 down to 90 on room air at rest. Improved to 97% on 2L. "      Nephrology consulted for SCAR.        PAST HISTORY  --------------------------------------------------------------------------------  PAST MEDICAL & SURGICAL HISTORY:  BPH (benign prostatic hyperplasia)  Poor historian: (Pt forgetful at times)  Unspecified open wound, right lower leg, initial encounter  H/O sickle cell trait  Chronic anemia  CKD (chronic kidney disease) stage 2, GFR 60-89 ml/min  Glucose intolerance (impaired glucose tolerance)  MRSA (methicillin resistant Staphylococcus aureus) infection: inner thigh 3/2016  Hypertension  S/P debridement: (Left lower leg, 12/2017)    FAMILY HISTORY:  Family history of liver cancer    PAST SOCIAL HISTORY:    ALLERGIES & MEDICATIONS  --------------------------------------------------------------------------------  Allergies    No Known Allergies    Intolerances      Standing Inpatient Medications  enoxaparin Injectable 40 milliGRAM(s) SubCutaneous every 12 hours  folic acid 1 milliGRAM(s) Oral daily  hydroxychloroquine 400 milliGRAM(s) Oral every 24 hours  hydroxychloroquine   Oral   methylPREDNISolone sodium succinate Injectable 50 milliGRAM(s) IV Push two times a day  metoprolol tartrate 12.5 milliGRAM(s) Oral two times a day  tamsulosin 0.4 milliGRAM(s) Oral at bedtime    PRN Inpatient Medications  acetaminophen   Tablet .. 650 milliGRAM(s) Oral every 4 hours PRN  ALBUTerol    90 MICROgram(s) HFA Inhaler 2 Puff(s) Inhalation every 4 hours PRN  benzonatate 100 milliGRAM(s) Oral three times a day PRN      REVIEW OF SYSTEMS  --------------------------------------------------------------------------------  unable to assess    VITALS/PHYSICAL EXAM  --------------------------------------------------------------------------------  T(C): 36.6 (04-13-20 @ 09:50), Max: 36.9 (04-12-20 @ 21:18)  HR: 58 (04-13-20 @ 09:50) (57 - 60)  BP: 137/76 (04-13-20 @ 09:50) (129/74 - 149/78)  RR: 20 (04-13-20 @ 09:50) (19 - 20)  SpO2: 94% (04-13-20 @ 09:50) (92% - 94%)  Wt(kg): --        Physical Exam:  	Due to the nature of the pt's isolation status, no bedside physical exam done to limit spread of infection.Objective data was reviewed in detail.      LABS/STUDIES  --------------------------------------------------------------------------------              12.7   6.06  >-----------<  199      [04-12-20 @ 16:26]              38.4     135  |  98  |  77.0  ----------------------------<  209      [04-13-20 @ 10:42]  4.7   |  17.0  |  4.19        Ca     8.4     [04-13-20 @ 10:42]      Mg     2.1     [04-13-20 @ 10:42]      Phos  3.2     [04-12-20 @ 16:26]    TPro  7.0  /  Alb  3.1  /  TBili  0.4  /  DBili  x   /  AST  22  /  ALT  10  /  AlkPhos  53  [04-12-20 @ 16:26]              [04-13-20 @ 10:42]    Creatinine Trend:  SCr 4.19 [04-13 @ 10:42]  SCr 3.81 [04-12 @ 16:26]  SCr 1.83 [04-10 @ 23:04]    Urinalysis - [05-03-16 @ 22:18]      Color Yellow / Appearance cloudy / SG 1.020 / pH 5.0      Gluc Negative / Ketone Trace  / Bili Moderate / Urobili 4       Blood Trace / Protein 30 / Leuk Est Trace / Nitrite Negative      RBC 0-2 / WBC 3-5 / Hyaline  / Gran 0-2 / Sq Epi  / Non Sq Epi Occasional / Bacteria Few      Ferritin 827      [04-13-20 @ 10:42]  HbA1c 5.8      [01-25-18 @ 20:31]    HCV 0.18, Nonreact      [04-13-20 @ 01:21]

## 2020-04-14 LAB
ANION GAP SERPL CALC-SCNC: 19 MMOL/L — HIGH (ref 5–17)
BUN SERPL-MCNC: 88 MG/DL — HIGH (ref 8–20)
CALCIUM SERPL-MCNC: 7.9 MG/DL — LOW (ref 8.6–10.2)
CHLORIDE SERPL-SCNC: 96 MMOL/L — LOW (ref 98–107)
CO2 SERPL-SCNC: 20 MMOL/L — LOW (ref 22–29)
CREAT SERPL-MCNC: 3.88 MG/DL — HIGH (ref 0.5–1.3)
GLUCOSE SERPL-MCNC: 310 MG/DL — HIGH (ref 70–99)
HCT VFR BLD CALC: 35.1 % — LOW (ref 39–50)
HGB BLD-MCNC: 11.9 G/DL — LOW (ref 13–17)
MAGNESIUM SERPL-MCNC: 2.1 MG/DL — SIGNIFICANT CHANGE UP (ref 1.6–2.6)
MCHC RBC-ENTMCNC: 28.1 PG — SIGNIFICANT CHANGE UP (ref 27–34)
MCHC RBC-ENTMCNC: 33.9 GM/DL — SIGNIFICANT CHANGE UP (ref 32–36)
MCV RBC AUTO: 82.8 FL — SIGNIFICANT CHANGE UP (ref 80–100)
PHOSPHATE SERPL-MCNC: 2.6 MG/DL — SIGNIFICANT CHANGE UP (ref 2.4–4.7)
PLATELET # BLD AUTO: 252 K/UL — SIGNIFICANT CHANGE UP (ref 150–400)
POTASSIUM SERPL-MCNC: 4.5 MMOL/L — SIGNIFICANT CHANGE UP (ref 3.5–5.3)
POTASSIUM SERPL-SCNC: 4.5 MMOL/L — SIGNIFICANT CHANGE UP (ref 3.5–5.3)
RBC # BLD: 4.24 M/UL — SIGNIFICANT CHANGE UP (ref 4.2–5.8)
RBC # FLD: 12 % — SIGNIFICANT CHANGE UP (ref 10.3–14.5)
SODIUM SERPL-SCNC: 135 MMOL/L — SIGNIFICANT CHANGE UP (ref 135–145)
WBC # BLD: 6.79 K/UL — SIGNIFICANT CHANGE UP (ref 3.8–10.5)
WBC # FLD AUTO: 6.79 K/UL — SIGNIFICANT CHANGE UP (ref 3.8–10.5)

## 2020-04-14 PROCEDURE — 99233 SBSQ HOSP IP/OBS HIGH 50: CPT

## 2020-04-14 RX ADMIN — TAMSULOSIN HYDROCHLORIDE 0.4 MILLIGRAM(S): 0.4 CAPSULE ORAL at 21:12

## 2020-04-14 RX ADMIN — ENOXAPARIN SODIUM 40 MILLIGRAM(S): 100 INJECTION SUBCUTANEOUS at 04:40

## 2020-04-14 RX ADMIN — Medication 50 MILLIGRAM(S): at 04:40

## 2020-04-14 RX ADMIN — Medication 12.5 MILLIGRAM(S): at 04:40

## 2020-04-14 RX ADMIN — Medication 400 MILLIGRAM(S): at 04:40

## 2020-04-14 RX ADMIN — Medication 75 MEQ/KG/HR: at 15:35

## 2020-04-14 RX ADMIN — Medication 1 MILLIGRAM(S): at 17:19

## 2020-04-14 RX ADMIN — Medication 50 MILLIGRAM(S): at 17:19

## 2020-04-14 RX ADMIN — ENOXAPARIN SODIUM 40 MILLIGRAM(S): 100 INJECTION SUBCUTANEOUS at 17:18

## 2020-04-14 RX ADMIN — Medication 12.5 MILLIGRAM(S): at 17:19

## 2020-04-14 NOTE — PROGRESS NOTE ADULT - ASSESSMENT
1) SCAR on CKD stage 3/4  2)ARDs/ COVID 19+  3) Metabolic acidosis    Pt with SCAR likely hemodynamic ATN superimposed on CKD  Scr improving; worsening BUN likely in setting of steroids  Renal US results reviewed- medical renal disease; left renal cysts  Monitor Scr, lytes, UOP.

## 2020-04-14 NOTE — PROGRESS NOTE ADULT - SUBJECTIVE AND OBJECTIVE BOX
[facility] DIVISION OF KIDNEY DISEASES AND HYPERTENSION -- FOLLOW UP NOTE  --------------------------------------------------------------------------------  Chief Complaint: SCAR on CKD  24 hour events/subjective:        PAST HISTORY  --------------------------------------------------------------------------------  No significant changes to PMH, PSH, FHx, SHx, unless otherwise noted    ALLERGIES & MEDICATIONS  --------------------------------------------------------------------------------  Allergies    No Known Allergies    Intolerances      Standing Inpatient Medications  enoxaparin Injectable 40 milliGRAM(s) SubCutaneous every 12 hours  folic acid 1 milliGRAM(s) Oral daily  hydroxychloroquine 400 milliGRAM(s) Oral every 24 hours  hydroxychloroquine   Oral   methylPREDNISolone sodium succinate Injectable 50 milliGRAM(s) IV Push two times a day  metoprolol tartrate 12.5 milliGRAM(s) Oral two times a day  sodium bicarbonate  Infusion 0.055 mEq/kG/Hr IV Continuous <Continuous>  tamsulosin 0.4 milliGRAM(s) Oral at bedtime    PRN Inpatient Medications  acetaminophen   Tablet .. 650 milliGRAM(s) Oral every 4 hours PRN  ALBUTerol    90 MICROgram(s) HFA Inhaler 2 Puff(s) Inhalation every 4 hours PRN  benzonatate 100 milliGRAM(s) Oral three times a day PRN      REVIEW OF SYSTEMS  --------------------------------------------------------------------------------  unable to assess due to COVID status to limit exposure    VITALS/PHYSICAL EXAM  --------------------------------------------------------------------------------  T(C): 36.9 (04-14-20 @ 08:14), Max: 36.9 (04-14-20 @ 08:14)  HR: 68 (04-14-20 @ 08:14) (60 - 71)  BP: 158/66 (04-14-20 @ 08:14) (137/81 - 158/66)  RR: 24 (04-14-20 @ 08:14) (18 - 24)  SpO2: 93% (04-14-20 @ 08:14) (91% - 93%)  Wt(kg): --        04-13-20 @ 07:01  -  04-14-20 @ 07:00  --------------------------------------------------------  IN: 1320 mL / OUT: 400 mL / NET: 920 mL      Physical Exam:  	Due to the nature of the pt's isolation status, no bedside physical exam done to limit spread of infection.  Objective data was reviewed in detail.      LABS/STUDIES  --------------------------------------------------------------------------------              11.9   6.79  >-----------<  252      [04-14-20 @ 09:20]              35.1     135  |  96  |  88.0  ----------------------------<  310      [04-14-20 @ 09:20]  4.5   |  20.0  |  3.88        Ca     7.9     [04-14-20 @ 09:20]      Mg     2.1     [04-14-20 @ 09:20]      Phos  2.6     [04-14-20 @ 09:20]    TPro  7.0  /  Alb  3.1  /  TBili  0.4  /  DBili  x   /  AST  22  /  ALT  10  /  AlkPhos  53  [04-12-20 @ 16:26]              [04-13-20 @ 10:42]    Creatinine Trend:  SCr 3.88 [04-14 @ 09:20]  SCr 4.19 [04-13 @ 10:42]  SCr 3.81 [04-12 @ 16:26]  SCr 1.83 [04-10 @ 23:04]        Ferritin 827      [04-13-20 @ 10:42]  HbA1c 5.8      [01-25-18 @ 20:31]    HCV 0.18, Nonreact      [04-13-20 @ 01:21]

## 2020-04-14 NOTE — PROGRESS NOTE ADULT - SUBJECTIVE AND OBJECTIVE BOX
INTERVAL HPI/OVERNIGHT EVENTS:    CC: acute hypoxic respiratory failure sec COVID 19, hypertension, BPH, diabetes mellitus, SCAR    No overnight events, denies complaints.    Vital Signs Last 24 Hrs  T(C): 36.9 (14 Apr 2020 08:14), Max: 36.9 (14 Apr 2020 08:14)  T(F): 98.5 (14 Apr 2020 08:14), Max: 98.5 (14 Apr 2020 08:14)  HR: 68 (14 Apr 2020 08:14) (60 - 71)  BP: 158/66 (14 Apr 2020 08:14) (137/81 - 158/66)  BP(mean): --  RR: 24 (14 Apr 2020 08:14) (18 - 24)  SpO2: 93% (14 Apr 2020 08:14) (91% - 93%)    PHYSICAL EXAM:    GENERAL: alert, not in distress  CHEST/LUNG: b/l air entry  HEART: regular  ABDOMEN: soft, bs+  EXTREMITIES:  no edema, tenderness    MEDICATIONS  (STANDING):  enoxaparin Injectable 40 milliGRAM(s) SubCutaneous every 12 hours  folic acid 1 milliGRAM(s) Oral daily  hydroxychloroquine 400 milliGRAM(s) Oral every 24 hours  hydroxychloroquine   Oral   methylPREDNISolone sodium succinate Injectable 50 milliGRAM(s) IV Push two times a day  metoprolol tartrate 12.5 milliGRAM(s) Oral two times a day  sodium bicarbonate  Infusion 0.055 mEq/kG/Hr (75 mL/Hr) IV Continuous <Continuous>  tamsulosin 0.4 milliGRAM(s) Oral at bedtime    MEDICATIONS  (PRN):  acetaminophen   Tablet .. 650 milliGRAM(s) Oral every 4 hours PRN Temp greater or equal to 38.5C (101.3F)  ALBUTerol    90 MICROgram(s) HFA Inhaler 2 Puff(s) Inhalation every 4 hours PRN Shortness of Breath and/or Wheezing  benzonatate 100 milliGRAM(s) Oral three times a day PRN Cough      Allergies    No Known Allergies    Intolerances          LABS:                          11.9   6.79  )-----------( 252      ( 14 Apr 2020 09:20 )             35.1     04-14    135  |  96<L>  |  88.0<H>  ----------------------------<  310<H>  4.5   |  20.0<L>  |  3.88<H>    Ca    7.9<L>      14 Apr 2020 09:20  Phos  2.6     04-14  Mg     2.1     04-14            RADIOLOGY & ADDITIONAL TESTS:

## 2020-04-14 NOTE — PROGRESS NOTE ADULT - ASSESSMENT
69 yr old male with hypertension, diabetes mellitus, CKD, BPH admitted with complaints of worsening shortness of breath, fever, body aches. He hypoxic in ED, requiring supplemental oxygen. COVID 19 testing was sent. Empirically started on steroids and Plaquenil on admission. He was also noted to have SCAR. Given worsening renal function, nephrology was consulted, bladder scan was ordered. His renal function improved.

## 2020-04-15 LAB
ANION GAP SERPL CALC-SCNC: 17 MMOL/L — SIGNIFICANT CHANGE UP (ref 5–17)
BUN SERPL-MCNC: 89 MG/DL — HIGH (ref 8–20)
CALCIUM SERPL-MCNC: 8.1 MG/DL — LOW (ref 8.6–10.2)
CHLORIDE SERPL-SCNC: 97 MMOL/L — LOW (ref 98–107)
CO2 SERPL-SCNC: 20 MMOL/L — LOW (ref 22–29)
CREAT SERPL-MCNC: 3.14 MG/DL — HIGH (ref 0.5–1.3)
GLUCOSE SERPL-MCNC: 403 MG/DL — HIGH (ref 70–99)
POTASSIUM SERPL-MCNC: 4.7 MMOL/L — SIGNIFICANT CHANGE UP (ref 3.5–5.3)
POTASSIUM SERPL-SCNC: 4.7 MMOL/L — SIGNIFICANT CHANGE UP (ref 3.5–5.3)
SODIUM SERPL-SCNC: 134 MMOL/L — LOW (ref 135–145)

## 2020-04-15 PROCEDURE — 99233 SBSQ HOSP IP/OBS HIGH 50: CPT

## 2020-04-15 RX ADMIN — Medication 75 MEQ/KG/HR: at 22:49

## 2020-04-15 RX ADMIN — ENOXAPARIN SODIUM 40 MILLIGRAM(S): 100 INJECTION SUBCUTANEOUS at 17:49

## 2020-04-15 RX ADMIN — Medication 1 MILLIGRAM(S): at 12:54

## 2020-04-15 RX ADMIN — Medication 50 MILLIGRAM(S): at 17:49

## 2020-04-15 RX ADMIN — Medication 50 MILLIGRAM(S): at 05:20

## 2020-04-15 RX ADMIN — Medication 12.5 MILLIGRAM(S): at 17:49

## 2020-04-15 RX ADMIN — ENOXAPARIN SODIUM 40 MILLIGRAM(S): 100 INJECTION SUBCUTANEOUS at 05:19

## 2020-04-15 RX ADMIN — Medication 75 MEQ/KG/HR: at 03:01

## 2020-04-15 RX ADMIN — TAMSULOSIN HYDROCHLORIDE 0.4 MILLIGRAM(S): 0.4 CAPSULE ORAL at 20:07

## 2020-04-15 RX ADMIN — Medication 12.5 MILLIGRAM(S): at 05:20

## 2020-04-15 RX ADMIN — Medication 400 MILLIGRAM(S): at 05:19

## 2020-04-15 NOTE — PROGRESS NOTE ADULT - ASSESSMENT
69 yr old male with hypertension, diabetes mellitus, CKD, BPH admitted with complaints of worsening shortness of breath, fever, body aches. He hypoxic in ED, requiring supplemental oxygen. COVID 19 testing was sent. Empirically started on steroids and Plaquenil on admission. He was also noted to have SCAR. Given worsening renal function, nephrology was consulted, bladder scan was ordered. Sodium bicarbonate infusion was ordered by nephrology for metabolic acidosis. He has a chronic right LE wound, wound care evaluation was requested.

## 2020-04-15 NOTE — PROGRESS NOTE ADULT - ASSESSMENT
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

## 2020-04-15 NOTE — PROGRESS NOTE ADULT - SUBJECTIVE AND OBJECTIVE BOX
[facility] DIVISION OF KIDNEY DISEASES AND HYPERTENSION -- FOLLOW UP NOTE  --------------------------------------------------------------------------------  Chief Complaint: SCAR    24 hour events/subjective:  No acute event noted  Pt on room air      PAST HISTORY  --------------------------------------------------------------------------------  No significant changes to PMH, PSH, FHx, SHx, unless otherwise noted    ALLERGIES & MEDICATIONS  --------------------------------------------------------------------------------  Allergies    No Known Allergies    Intolerances      Standing Inpatient Medications  enoxaparin Injectable 40 milliGRAM(s) SubCutaneous every 12 hours  folic acid 1 milliGRAM(s) Oral daily  methylPREDNISolone sodium succinate Injectable 50 milliGRAM(s) IV Push two times a day  metoprolol tartrate 12.5 milliGRAM(s) Oral two times a day  sodium bicarbonate  Infusion 0.055 mEq/kG/Hr IV Continuous <Continuous>  tamsulosin 0.4 milliGRAM(s) Oral at bedtime    PRN Inpatient Medications  acetaminophen   Tablet .. 650 milliGRAM(s) Oral every 4 hours PRN  ALBUTerol    90 MICROgram(s) HFA Inhaler 2 Puff(s) Inhalation every 4 hours PRN  benzonatate 100 milliGRAM(s) Oral three times a day PRN      REVIEW OF SYSTEMS  --------------------------------------------------------------------------------  unable to obtain    VITALS/PHYSICAL EXAM  --------------------------------------------------------------------------------  T(C): 36.6 (04-15-20 @ 09:49), Max: 36.8 (04-14-20 @ 16:46)  HR: 65 (04-15-20 @ 09:49) (62 - 70)  BP: 173/95 (04-15-20 @ 09:49) (148/82 - 173/95)  RR: 19 (04-15-20 @ 09:49) (19 - 20)  SpO2: 93% (04-15-20 @ 09:49) (92% - 97%)  Wt(kg): --        04-14-20 @ 07:01  -  04-15-20 @ 07:00  --------------------------------------------------------  IN: 750 mL / OUT: 900 mL / NET: -150 mL      Physical Exam:  	Due to the nature of the pt's isolation status, no bedside physical exam done to limit spread of infection. Objective data was reviewed in detail.      LABS/STUDIES  --------------------------------------------------------------------------------              11.9   6.79  >-----------<  252      [04-14-20 @ 09:20]              35.1     134  |  97  |  89.0  ----------------------------<  403      [04-15-20 @ 10:08]  4.7   |  20.0  |  3.14        Ca     8.1     [04-15-20 @ 10:08]      Mg     2.1     [04-14-20 @ 09:20]      Phos  2.6     [04-14-20 @ 09:20]      Creatinine Trend:  SCr 3.14 [04-15 @ 10:08]  SCr 3.88 [04-14 @ 09:20]  SCr 4.19 [04-13 @ 10:42]  SCr 3.81 [04-12 @ 16:26]  SCr 1.83 [04-10 @ 23:04]        Ferritin 827      [04-13-20 @ 10:42]  HbA1c 5.8      [01-25-18 @ 20:31]    HCV 0.18, Nonreact      [04-13-20 @ 01:21]

## 2020-04-15 NOTE — PROGRESS NOTE ADULT - SUBJECTIVE AND OBJECTIVE BOX
INTERVAL HPI/OVERNIGHT EVENTS:    CC: acute hypoxic respiratory failure sec COVID 19, hypertension, BPH, diabetes mellitus, SCAR    No overnight events, feels better.    Vital Signs Last 24 Hrs  T(C): 36.6 (15 Apr 2020 09:49), Max: 36.8 (14 Apr 2020 16:46)  T(F): 97.8 (15 Apr 2020 09:49), Max: 98.3 (14 Apr 2020 21:18)  HR: 65 (15 Apr 2020 09:49) (62 - 70)  BP: 173/95 (15 Apr 2020 09:49) (148/82 - 173/95)  BP(mean): --  RR: 19 (15 Apr 2020 09:49) (19 - 20)  SpO2: 93% (15 Apr 2020 09:49) (92% - 97%)    PHYSICAL EXAM:    GENERAL: alert, not in distress  CHEST/LUNG: b/l air entry  HEART: regular  ABDOMEN: soft, bs+  EXTREMITIES:  right LE wound, no oozing, appears healed.    MEDICATIONS  (STANDING):  enoxaparin Injectable 40 milliGRAM(s) SubCutaneous every 12 hours  folic acid 1 milliGRAM(s) Oral daily  methylPREDNISolone sodium succinate Injectable 50 milliGRAM(s) IV Push two times a day  metoprolol tartrate 12.5 milliGRAM(s) Oral two times a day  sodium bicarbonate  Infusion 0.055 mEq/kG/Hr (75 mL/Hr) IV Continuous <Continuous>  tamsulosin 0.4 milliGRAM(s) Oral at bedtime    MEDICATIONS  (PRN):  acetaminophen   Tablet .. 650 milliGRAM(s) Oral every 4 hours PRN Temp greater or equal to 38.5C (101.3F)  ALBUTerol    90 MICROgram(s) HFA Inhaler 2 Puff(s) Inhalation every 4 hours PRN Shortness of Breath and/or Wheezing  benzonatate 100 milliGRAM(s) Oral three times a day PRN Cough      Allergies    No Known Allergies    Intolerances          LABS:                          11.9   6.79  )-----------( 252      ( 14 Apr 2020 09:20 )             35.1     04-15    134<L>  |  97<L>  |  89.0<H>  ----------------------------<  403<H>  4.7   |  20.0<L>  |  3.14<H>    Ca    8.1<L>      15 Apr 2020 10:08  Phos  2.6     04-14  Mg     2.1     04-14            RADIOLOGY & ADDITIONAL TESTS:

## 2020-04-16 DIAGNOSIS — R73.9 HYPERGLYCEMIA, UNSPECIFIED: ICD-10-CM

## 2020-04-16 LAB
ANION GAP SERPL CALC-SCNC: 17 MMOL/L — SIGNIFICANT CHANGE UP (ref 5–17)
BUN SERPL-MCNC: 83 MG/DL — HIGH (ref 8–20)
CALCIUM SERPL-MCNC: 8.1 MG/DL — LOW (ref 8.6–10.2)
CHLORIDE SERPL-SCNC: 99 MMOL/L — SIGNIFICANT CHANGE UP (ref 98–107)
CO2 SERPL-SCNC: 20 MMOL/L — LOW (ref 22–29)
CREAT SERPL-MCNC: 2.7 MG/DL — HIGH (ref 0.5–1.3)
CRP SERPL-MCNC: 0.83 MG/DL — HIGH (ref 0–0.4)
FERRITIN SERPL-MCNC: 835 NG/ML — HIGH (ref 30–400)
GLUCOSE BLDC GLUCOMTR-MCNC: 325 MG/DL — HIGH (ref 70–99)
GLUCOSE BLDC GLUCOMTR-MCNC: 442 MG/DL — HIGH (ref 70–99)
GLUCOSE SERPL-MCNC: 488 MG/DL — HIGH (ref 70–99)
HCT VFR BLD CALC: 37.1 % — LOW (ref 39–50)
HGB BLD-MCNC: 12.6 G/DL — LOW (ref 13–17)
LDH SERPL L TO P-CCNC: 342 U/L — HIGH (ref 98–192)
MAGNESIUM SERPL-MCNC: 2.2 MG/DL — SIGNIFICANT CHANGE UP (ref 1.6–2.6)
MCHC RBC-ENTMCNC: 28.5 PG — SIGNIFICANT CHANGE UP (ref 27–34)
MCHC RBC-ENTMCNC: 34 GM/DL — SIGNIFICANT CHANGE UP (ref 32–36)
MCV RBC AUTO: 83.9 FL — SIGNIFICANT CHANGE UP (ref 80–100)
PHOSPHATE SERPL-MCNC: 2.8 MG/DL — SIGNIFICANT CHANGE UP (ref 2.4–4.7)
PLATELET # BLD AUTO: 284 K/UL — SIGNIFICANT CHANGE UP (ref 150–400)
POTASSIUM SERPL-MCNC: 5.5 MMOL/L — HIGH (ref 3.5–5.3)
POTASSIUM SERPL-SCNC: 5.5 MMOL/L — HIGH (ref 3.5–5.3)
RBC # BLD: 4.42 M/UL — SIGNIFICANT CHANGE UP (ref 4.2–5.8)
RBC # FLD: 12 % — SIGNIFICANT CHANGE UP (ref 10.3–14.5)
SODIUM SERPL-SCNC: 136 MMOL/L — SIGNIFICANT CHANGE UP (ref 135–145)
WBC # BLD: 7.68 K/UL — SIGNIFICANT CHANGE UP (ref 3.8–10.5)
WBC # FLD AUTO: 7.68 K/UL — SIGNIFICANT CHANGE UP (ref 3.8–10.5)

## 2020-04-16 PROCEDURE — 99233 SBSQ HOSP IP/OBS HIGH 50: CPT

## 2020-04-16 RX ORDER — INSULIN LISPRO 100/ML
2 VIAL (ML) SUBCUTANEOUS
Refills: 0 | Status: DISCONTINUED | OUTPATIENT
Start: 2020-04-16 | End: 2020-04-17

## 2020-04-16 RX ORDER — DEXTROSE 50 % IN WATER 50 %
15 SYRINGE (ML) INTRAVENOUS ONCE
Refills: 0 | Status: DISCONTINUED | OUTPATIENT
Start: 2020-04-16 | End: 2020-04-20

## 2020-04-16 RX ORDER — DEXTROSE 50 % IN WATER 50 %
25 SYRINGE (ML) INTRAVENOUS ONCE
Refills: 0 | Status: DISCONTINUED | OUTPATIENT
Start: 2020-04-16 | End: 2020-04-20

## 2020-04-16 RX ORDER — SODIUM CHLORIDE 9 MG/ML
1000 INJECTION, SOLUTION INTRAVENOUS
Refills: 0 | Status: DISCONTINUED | OUTPATIENT
Start: 2020-04-16 | End: 2020-04-20

## 2020-04-16 RX ORDER — SODIUM BICARBONATE 1 MEQ/ML
650 SYRINGE (ML) INTRAVENOUS THREE TIMES A DAY
Refills: 0 | Status: DISCONTINUED | OUTPATIENT
Start: 2020-04-16 | End: 2020-04-20

## 2020-04-16 RX ORDER — DEXTROSE 50 % IN WATER 50 %
12.5 SYRINGE (ML) INTRAVENOUS ONCE
Refills: 0 | Status: DISCONTINUED | OUTPATIENT
Start: 2020-04-16 | End: 2020-04-20

## 2020-04-16 RX ORDER — INSULIN LISPRO 100/ML
10 VIAL (ML) SUBCUTANEOUS ONCE
Refills: 0 | Status: COMPLETED | OUTPATIENT
Start: 2020-04-16 | End: 2020-04-16

## 2020-04-16 RX ORDER — GLUCAGON INJECTION, SOLUTION 0.5 MG/.1ML
1 INJECTION, SOLUTION SUBCUTANEOUS ONCE
Refills: 0 | Status: DISCONTINUED | OUTPATIENT
Start: 2020-04-16 | End: 2020-04-20

## 2020-04-16 RX ORDER — INSULIN LISPRO 100/ML
VIAL (ML) SUBCUTANEOUS
Refills: 0 | Status: DISCONTINUED | OUTPATIENT
Start: 2020-04-16 | End: 2020-04-16

## 2020-04-16 RX ORDER — INSULIN LISPRO 100/ML
VIAL (ML) SUBCUTANEOUS
Refills: 0 | Status: DISCONTINUED | OUTPATIENT
Start: 2020-04-16 | End: 2020-04-17

## 2020-04-16 RX ORDER — INSULIN GLARGINE 100 [IU]/ML
5 INJECTION, SOLUTION SUBCUTANEOUS AT BEDTIME
Refills: 0 | Status: DISCONTINUED | OUTPATIENT
Start: 2020-04-16 | End: 2020-04-17

## 2020-04-16 RX ADMIN — Medication 10 UNIT(S): at 17:28

## 2020-04-16 RX ADMIN — TAMSULOSIN HYDROCHLORIDE 0.4 MILLIGRAM(S): 0.4 CAPSULE ORAL at 21:39

## 2020-04-16 RX ADMIN — Medication 50 MILLIGRAM(S): at 04:15

## 2020-04-16 RX ADMIN — ENOXAPARIN SODIUM 40 MILLIGRAM(S): 100 INJECTION SUBCUTANEOUS at 17:06

## 2020-04-16 RX ADMIN — Medication 650 MILLIGRAM(S): at 21:39

## 2020-04-16 RX ADMIN — INSULIN GLARGINE 5 UNIT(S): 100 INJECTION, SOLUTION SUBCUTANEOUS at 21:39

## 2020-04-16 RX ADMIN — Medication 8: at 21:38

## 2020-04-16 RX ADMIN — Medication 1 MILLIGRAM(S): at 11:09

## 2020-04-16 RX ADMIN — Medication 650 MILLIGRAM(S): at 17:06

## 2020-04-16 RX ADMIN — Medication 12.5 MILLIGRAM(S): at 17:08

## 2020-04-16 RX ADMIN — ENOXAPARIN SODIUM 40 MILLIGRAM(S): 100 INJECTION SUBCUTANEOUS at 04:15

## 2020-04-16 NOTE — DIETITIAN INITIAL EVALUATION ADULT. - ADD RECOMMEND
RX: MVI, Vitamin C, thiamine. Encourage po intake and HBV protein. Monitor wts and labs. RX: Nephro-oz, Vitamin C, thiamine. Encourage po intake and HBV protein. Monitor wts and labs.

## 2020-04-16 NOTE — PROGRESS NOTE ADULT - ASSESSMENT
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

## 2020-04-16 NOTE — PROGRESS NOTE ADULT - ATTENDING COMMENTS
Discussed with patient and RN plan of care.  PT evaluation. Discussed with patient and RN plan of care.  PT evaluation.  Updated daughter Chasity.

## 2020-04-16 NOTE — DIETITIAN INITIAL EVALUATION ADULT. - OTHER INFO
69 yr old male with hypertension, diabetes mellitus, CKD, BPH admitted with complaints of worsening shortness of breath, fever, body aches. He hypoxic in ED, requiring supplemental oxygen. COVID 19 testing was sent. He was also noted to have SCAR. Given worsening renal function, nephrology was consulted, bladder scan was ordered. Sodium bicarbonate infusion was ordered by nephrology for metabolic acidosis. He has a chronic right LE wound, wound care evaluation was requested. Pt with Acute respiratory failure with hypoxia Sec COVID 19 pneumonia. Generalized 1+ edema noted. Pt has good po intake consuming 100% of meals. Last documented BM 4/16.

## 2020-04-16 NOTE — PROGRESS NOTE ADULT - ASSESSMENT
69 yr old male with hypertension, diabetes mellitus, CKD, BPH admitted with complaints of worsening shortness of breath, fever, body aches. He hypoxic in ED, requiring supplemental oxygen. COVID 19 testing was sent. Empirically started on steroids and Plaquenil on admission. He was also noted to have SCAR. Given worsening renal function, nephrology was consulted, bladder scan was ordered. Sodium bicarbonate infusion was ordered by nephrology for metabolic acidosis. He has a chronic right LE wound, wound care evaluation was requested. Advised local wound care. He was noted to have urinary retention on bladder scans and hence Dunn catheter was inserted. His renal function continued to improve.

## 2020-04-16 NOTE — PROGRESS NOTE ADULT - SUBJECTIVE AND OBJECTIVE BOX
[facility] DIVISION OF KIDNEY DISEASES AND HYPERTENSION -- FOLLOW UP NOTE  --------------------------------------------------------------------------------  Chief Complaint: SCAR    24 hour events/subjective:  No acute event noted  Patient on room air      PAST HISTORY  --------------------------------------------------------------------------------  No significant changes to PMH, PSH, FHx, SHx, unless otherwise noted    ALLERGIES & MEDICATIONS  --------------------------------------------------------------------------------  Allergies  No Known Allergies    Standing Inpatient Medications  enoxaparin Injectable 40 milliGRAM(s) SubCutaneous every 12 hours  folic acid 1 milliGRAM(s) Oral daily  metoprolol tartrate 12.5 milliGRAM(s) Oral two times a day  sodium bicarbonate  Infusion 0.055 mEq/kG/Hr IV Continuous <Continuous>  tamsulosin 0.4 milliGRAM(s) Oral at bedtime    PRN Inpatient Medications  acetaminophen   Tablet .. 650 milliGRAM(s) Oral every 4 hours PRN  ALBUTerol    90 MICROgram(s) HFA Inhaler 2 Puff(s) Inhalation every 4 hours PRN  benzonatate 100 milliGRAM(s) Oral three times a day PRN    REVIEW OF SYSTEMS  --------------------------------------------------------------------------------  ELLIS    VITALS/PHYSICAL EXAM  --------------------------------------------------------------------------------  T(C): 36.8 (04-16-20 @ 08:34), Max: 36.8 (04-15-20 @ 16:49)  HR: 57 (04-16-20 @ 08:34) (57 - 64)  BP: 159/94 (04-16-20 @ 08:34) (138/76 - 175/82)  RR: 20 (04-16-20 @ 08:34) (20 - 20)  SpO2: 93% (04-16-20 @ 08:34) (90% - 96%)    04-15-20 @ 07:01  -  04-16-20 @ 07:00  --------------------------------------------------------  IN: 0 mL / OUT: 900 mL / NET: -900 mL    Physical Exam:  Due to the nature of this patient's COVID-19 isolation status, no bedside physical exam was done to limit spread of infection. Objective data were reviewed in detail.    LABS/STUDIES  --------------------------------------------------------------------------------              12.6   7.68  >-----------<  284      [04-16-20 @ 07:16]              37.1     136  |  99  |  83.0  ----------------------------<  488      [04-16-20 @ 07:16]  5.5   |  20.0  |  2.70        Ca     8.1     [04-16-20 @ 07:16]      Mg     2.2     [04-16-20 @ 07:16]      Phos  2.8     [04-16-20 @ 07:16]          [04-16-20 @ 07:16]    Creatinine Trend:  SCr 2.70 [04-16 @ 07:16]  SCr 3.14 [04-15 @ 10:08]  SCr 3.88 [04-14 @ 09:20]  SCr 4.19 [04-13 @ 10:42]  SCr 3.81 [04-12 @ 16:26]    Ferritin 835      [04-16-20 @ 07:16]  HbA1c 5.8      [01-25-18 @ 20:31]    HCV 0.18, Nonreact      [04-13-20 @ 01:21] [facility] DIVISION OF KIDNEY DISEASES AND HYPERTENSION -- FOLLOW UP NOTE  --------------------------------------------------------------------------------  Chief Complaint: SCAR    24 hour events/subjective:  No acute event noted  Patient on room air    PAST HISTORY  --------------------------------------------------------------------------------  No significant changes to PMH, PSH, FHx, SHx, unless otherwise noted    ALLERGIES & MEDICATIONS  --------------------------------------------------------------------------------  Allergies  No Known Allergies    Standing Inpatient Medications  enoxaparin Injectable 40 milliGRAM(s) SubCutaneous every 12 hours  folic acid 1 milliGRAM(s) Oral daily  metoprolol tartrate 12.5 milliGRAM(s) Oral two times a day  sodium bicarbonate  Infusion 0.055 mEq/kG/Hr IV Continuous <Continuous>  tamsulosin 0.4 milliGRAM(s) Oral at bedtime    PRN Inpatient Medications  acetaminophen   Tablet .. 650 milliGRAM(s) Oral every 4 hours PRN  ALBUTerol    90 MICROgram(s) HFA Inhaler 2 Puff(s) Inhalation every 4 hours PRN  benzonatate 100 milliGRAM(s) Oral three times a day PRN    REVIEW OF SYSTEMS  --------------------------------------------------------------------------------  ELLIS    VITALS/PHYSICAL EXAM  --------------------------------------------------------------------------------  T(C): 36.8 (04-16-20 @ 08:34), Max: 36.8 (04-15-20 @ 16:49)  HR: 57 (04-16-20 @ 08:34) (57 - 64)  BP: 159/94 (04-16-20 @ 08:34) (138/76 - 175/82)  RR: 20 (04-16-20 @ 08:34) (20 - 20)  SpO2: 93% (04-16-20 @ 08:34) (90% - 96%)    04-15-20 @ 07:01  -  04-16-20 @ 07:00  --------------------------------------------------------  IN: 0 mL / OUT: 900 mL / NET: -900 mL    Physical Exam:  Due to the nature of this patient's COVID-19 isolation status, no bedside physical exam was done to limit spread of infection. Objective data were reviewed in detail.    LABS/STUDIES  --------------------------------------------------------------------------------              12.6   7.68  >-----------<  284      [04-16-20 @ 07:16]              37.1     136  |  99  |  83.0  ----------------------------<  488      [04-16-20 @ 07:16]  5.5   |  20.0  |  2.70        Ca     8.1     [04-16-20 @ 07:16]      Mg     2.2     [04-16-20 @ 07:16]      Phos  2.8     [04-16-20 @ 07:16]          [04-16-20 @ 07:16]    Creatinine Trend:  SCr 2.70 [04-16 @ 07:16]  SCr 3.14 [04-15 @ 10:08]  SCr 3.88 [04-14 @ 09:20]  SCr 4.19 [04-13 @ 10:42]  SCr 3.81 [04-12 @ 16:26]    Ferritin 835      [04-16-20 @ 07:16]  HbA1c 5.8      [01-25-18 @ 20:31]    HCV 0.18, Nonreact      [04-13-20 @ 01:21]

## 2020-04-16 NOTE — DIETITIAN INITIAL EVALUATION ADULT. - PERTINENT LABORATORY DATA
04-16 Na136 mmol/L Glu 488 mg/dL<H> K+ 5.5 mmol/L<H> Cr  2.70 mg/dL<H> BUN 83.0 mg/dL<H> Phos 2.8 mg/dL Alb n/a   PAB n/a

## 2020-04-16 NOTE — PROGRESS NOTE ADULT - PROBLEM SELECTOR PLAN 5
Check A1C in am, monitor fingersticks, start low dose Lantus. Could be secondary to steroids, which have been discontinued.

## 2020-04-16 NOTE — PROGRESS NOTE ADULT - SUBJECTIVE AND OBJECTIVE BOX
INTERVAL HPI/OVERNIGHT EVENTS:    CC:  acute hypoxic respiratory failure sec COVID 19, hypertension, BPH, diabetes mellitus, SCAR    No overnight events, elevated blood sugars this am. No shortness of breath. He was evaluation by wound care RN yesterday.    Vital Signs Last 24 Hrs  T(C): 36.8 (16 Apr 2020 08:34), Max: 36.8 (15 Apr 2020 16:49)  T(F): 98.2 (16 Apr 2020 08:34), Max: 98.2 (15 Apr 2020 16:49)  HR: 57 (16 Apr 2020 08:34) (57 - 64)  BP: 159/94 (16 Apr 2020 08:34) (138/76 - 175/82)  BP(mean): --  RR: 20 (16 Apr 2020 08:34) (20 - 20)  SpO2: 91% (16 Apr 2020 11:22) (90% - 96%)    PHYSICAL EXAM:    GENERAL: alert, not in distress  CHEST/LUNG: b/l air entry  HEART: regular  ABDOMEN: soft, bs+   EXTREMITIES: no edema, chronic right LE wound     MEDICATIONS  (STANDING):  dextrose 5%. 1000 milliLiter(s) (50 mL/Hr) IV Continuous <Continuous>  dextrose 50% Injectable 12.5 Gram(s) IV Push once  dextrose 50% Injectable 25 Gram(s) IV Push once  dextrose 50% Injectable 25 Gram(s) IV Push once  enoxaparin Injectable 40 milliGRAM(s) SubCutaneous every 12 hours  folic acid 1 milliGRAM(s) Oral daily  insulin glargine Injectable (LANTUS) 5 Unit(s) SubCutaneous at bedtime  insulin lispro (HumaLOG) corrective regimen sliding scale   SubCutaneous three times a day before meals  metoprolol tartrate 12.5 milliGRAM(s) Oral two times a day  sodium bicarbonate 650 milliGRAM(s) Oral three times a day  tamsulosin 0.4 milliGRAM(s) Oral at bedtime    MEDICATIONS  (PRN):  acetaminophen   Tablet .. 650 milliGRAM(s) Oral every 4 hours PRN Temp greater or equal to 38.5C (101.3F)  ALBUTerol    90 MICROgram(s) HFA Inhaler 2 Puff(s) Inhalation every 4 hours PRN Shortness of Breath and/or Wheezing  benzonatate 100 milliGRAM(s) Oral three times a day PRN Cough  dextrose 40% Gel 15 Gram(s) Oral once PRN Blood Glucose LESS THAN 70 milliGRAM(s)/deciliter  glucagon  Injectable 1 milliGRAM(s) IntraMuscular once PRN Glucose LESS THAN 70 milligrams/deciliter      Allergies    No Known Allergies    Intolerances          LABS:                          12.6   7.68  )-----------( 284      ( 16 Apr 2020 07:16 )             37.1     04-16    136  |  99  |  83.0<H>  ----------------------------<  488<H>  5.5<H>   |  20.0<L>  |  2.70<H>    Ca    8.1<L>      16 Apr 2020 07:16  Phos  2.8     04-16  Mg     2.2     04-16            RADIOLOGY & ADDITIONAL TESTS:

## 2020-04-17 DIAGNOSIS — E11.9 TYPE 2 DIABETES MELLITUS WITHOUT COMPLICATIONS: ICD-10-CM

## 2020-04-17 LAB
A1C WITH ESTIMATED AVERAGE GLUCOSE RESULT: 6.8 % — HIGH (ref 4–5.6)
ANION GAP SERPL CALC-SCNC: 13 MMOL/L — SIGNIFICANT CHANGE UP (ref 5–17)
BUN SERPL-MCNC: 65 MG/DL — HIGH (ref 8–20)
CALCIUM SERPL-MCNC: 8.4 MG/DL — LOW (ref 8.6–10.2)
CHLORIDE SERPL-SCNC: 103 MMOL/L — SIGNIFICANT CHANGE UP (ref 98–107)
CO2 SERPL-SCNC: 24 MMOL/L — SIGNIFICANT CHANGE UP (ref 22–29)
CREAT SERPL-MCNC: 2.18 MG/DL — HIGH (ref 0.5–1.3)
CRP SERPL-MCNC: 0.57 MG/DL — HIGH (ref 0–0.4)
ESTIMATED AVERAGE GLUCOSE: 148 MG/DL — HIGH (ref 68–114)
FERRITIN SERPL-MCNC: 891 NG/ML — HIGH (ref 30–400)
GLUCOSE BLDC GLUCOMTR-MCNC: 193 MG/DL — HIGH (ref 70–99)
GLUCOSE BLDC GLUCOMTR-MCNC: 208 MG/DL — HIGH (ref 70–99)
GLUCOSE BLDC GLUCOMTR-MCNC: 259 MG/DL — HIGH (ref 70–99)
GLUCOSE BLDC GLUCOMTR-MCNC: 305 MG/DL — HIGH (ref 70–99)
GLUCOSE SERPL-MCNC: 261 MG/DL — HIGH (ref 70–99)
HCT VFR BLD CALC: 41.2 % — SIGNIFICANT CHANGE UP (ref 39–50)
HGB BLD-MCNC: 14 G/DL — SIGNIFICANT CHANGE UP (ref 13–17)
LDH SERPL L TO P-CCNC: 362 U/L — HIGH (ref 98–192)
MAGNESIUM SERPL-MCNC: 2.3 MG/DL — SIGNIFICANT CHANGE UP (ref 1.6–2.6)
MCHC RBC-ENTMCNC: 28.6 PG — SIGNIFICANT CHANGE UP (ref 27–34)
MCHC RBC-ENTMCNC: 34 GM/DL — SIGNIFICANT CHANGE UP (ref 32–36)
MCV RBC AUTO: 84.1 FL — SIGNIFICANT CHANGE UP (ref 80–100)
PHOSPHATE SERPL-MCNC: 3 MG/DL — SIGNIFICANT CHANGE UP (ref 2.4–4.7)
PLATELET # BLD AUTO: 310 K/UL — SIGNIFICANT CHANGE UP (ref 150–400)
POTASSIUM SERPL-MCNC: 5 MMOL/L — SIGNIFICANT CHANGE UP (ref 3.5–5.3)
POTASSIUM SERPL-SCNC: 5 MMOL/L — SIGNIFICANT CHANGE UP (ref 3.5–5.3)
RBC # BLD: 4.9 M/UL — SIGNIFICANT CHANGE UP (ref 4.2–5.8)
RBC # FLD: 12 % — SIGNIFICANT CHANGE UP (ref 10.3–14.5)
SODIUM SERPL-SCNC: 140 MMOL/L — SIGNIFICANT CHANGE UP (ref 135–145)
WBC # BLD: 9.04 K/UL — SIGNIFICANT CHANGE UP (ref 3.8–10.5)
WBC # FLD AUTO: 9.04 K/UL — SIGNIFICANT CHANGE UP (ref 3.8–10.5)

## 2020-04-17 PROCEDURE — 99233 SBSQ HOSP IP/OBS HIGH 50: CPT

## 2020-04-17 RX ORDER — INSULIN LISPRO 100/ML
3 VIAL (ML) SUBCUTANEOUS ONCE
Refills: 0 | Status: COMPLETED | OUTPATIENT
Start: 2020-04-17 | End: 2020-04-17

## 2020-04-17 RX ORDER — INSULIN LISPRO 100/ML
3 VIAL (ML) SUBCUTANEOUS
Refills: 0 | Status: DISCONTINUED | OUTPATIENT
Start: 2020-04-17 | End: 2020-04-20

## 2020-04-17 RX ORDER — INSULIN LISPRO 100/ML
VIAL (ML) SUBCUTANEOUS
Refills: 0 | Status: DISCONTINUED | OUTPATIENT
Start: 2020-04-17 | End: 2020-04-20

## 2020-04-17 RX ORDER — INSULIN GLARGINE 100 [IU]/ML
10 INJECTION, SOLUTION SUBCUTANEOUS AT BEDTIME
Refills: 0 | Status: DISCONTINUED | OUTPATIENT
Start: 2020-04-17 | End: 2020-04-20

## 2020-04-17 RX ADMIN — ENOXAPARIN SODIUM 40 MILLIGRAM(S): 100 INJECTION SUBCUTANEOUS at 16:57

## 2020-04-17 RX ADMIN — Medication 3 UNIT(S): at 18:59

## 2020-04-17 RX ADMIN — Medication 2 UNIT(S): at 08:43

## 2020-04-17 RX ADMIN — Medication 650 MILLIGRAM(S): at 12:59

## 2020-04-17 RX ADMIN — INSULIN GLARGINE 10 UNIT(S): 100 INJECTION, SOLUTION SUBCUTANEOUS at 21:37

## 2020-04-17 RX ADMIN — Medication 1 MILLIGRAM(S): at 12:59

## 2020-04-17 RX ADMIN — Medication 6: at 08:43

## 2020-04-17 RX ADMIN — Medication 12.5 MILLIGRAM(S): at 04:37

## 2020-04-17 RX ADMIN — Medication 3 UNIT(S): at 13:08

## 2020-04-17 RX ADMIN — Medication 1 TABLET(S): at 12:59

## 2020-04-17 RX ADMIN — TAMSULOSIN HYDROCHLORIDE 0.4 MILLIGRAM(S): 0.4 CAPSULE ORAL at 21:38

## 2020-04-17 RX ADMIN — Medication 2: at 12:56

## 2020-04-17 RX ADMIN — Medication 650 MILLIGRAM(S): at 21:38

## 2020-04-17 RX ADMIN — Medication 4: at 18:59

## 2020-04-17 RX ADMIN — Medication 650 MILLIGRAM(S): at 04:38

## 2020-04-17 RX ADMIN — Medication 12.5 MILLIGRAM(S): at 16:57

## 2020-04-17 RX ADMIN — ENOXAPARIN SODIUM 40 MILLIGRAM(S): 100 INJECTION SUBCUTANEOUS at 04:38

## 2020-04-17 RX ADMIN — Medication 100 MILLIGRAM(S): at 21:38

## 2020-04-17 NOTE — PHYSICAL THERAPY INITIAL EVALUATION ADULT - ADDITIONAL COMMENTS
pt states he lives with his wife in a second floor apartment with 13 steps to enter (+rail). pt states he was independent with all mobility without device prior to admit. +working. does not drive. wife drives. pt takes public transportation. no DME.

## 2020-04-17 NOTE — PROGRESS NOTE ADULT - PROBLEM SELECTOR PROBLEM 4
Acute kidney failure

## 2020-04-17 NOTE — PHYSICAL THERAPY INITIAL EVALUATION ADULT - CRITERIA FOR SKILLED THERAPEUTIC INTERVENTIONS
impairments found/therapy frequency/anticipated equipment needs at discharge/risk reduction/prevention/rehab potential/anticipated discharge recommendation/functional limitations in following categories

## 2020-04-17 NOTE — PHYSICAL THERAPY INITIAL EVALUATION ADULT - PERTINENT HX OF CURRENT PROBLEM, REHAB EVAL
FMLA forms received placed in MD folder.  
69 yr old male with hypertension, diabetes mellitus, CKD, BPH admitted with complaints of worsening shortness of breath, fever, body aches. He hypoxic in ED, requiring supplemental oxygen. COVID 19 testing was sent. Empirically started on steroids and Plaquenil on admission. He was also noted to have SCAR. Given worsening renal function, nephrology was consulted, bladder scan was ordered.

## 2020-04-17 NOTE — PROGRESS NOTE ADULT - SUBJECTIVE AND OBJECTIVE BOX
[facility] DIVISION OF KIDNEY DISEASES AND HYPERTENSION -- FOLLOW UP NOTE  --------------------------------------------------------------------------------  Chief Complaint: SCAR    24 hour events/subjective:  No acute event  no new complaint      PAST HISTORY  --------------------------------------------------------------------------------  No significant changes to PMH, PSH, FHx, SHx, unless otherwise noted    ALLERGIES & MEDICATIONS  --------------------------------------------------------------------------------  Allergies    No Known Allergies    Intolerances      Standing Inpatient Medications  dextrose 5%. 1000 milliLiter(s) IV Continuous <Continuous>  dextrose 50% Injectable 12.5 Gram(s) IV Push once  dextrose 50% Injectable 25 Gram(s) IV Push once  dextrose 50% Injectable 25 Gram(s) IV Push once  enoxaparin Injectable 40 milliGRAM(s) SubCutaneous every 12 hours  folic acid 1 milliGRAM(s) Oral daily  insulin glargine Injectable (LANTUS) 10 Unit(s) SubCutaneous at bedtime  insulin lispro (HumaLOG) corrective regimen sliding scale   SubCutaneous three times a day before meals  insulin lispro Injectable (HumaLOG) 3 Unit(s) SubCutaneous three times a day before meals  metoprolol tartrate 12.5 milliGRAM(s) Oral two times a day  Nephro-oz 1 Tablet(s) Oral daily  sodium bicarbonate 650 milliGRAM(s) Oral three times a day  tamsulosin 0.4 milliGRAM(s) Oral at bedtime    PRN Inpatient Medications  acetaminophen   Tablet .. 650 milliGRAM(s) Oral every 4 hours PRN  ALBUTerol    90 MICROgram(s) HFA Inhaler 2 Puff(s) Inhalation every 4 hours PRN  benzonatate 100 milliGRAM(s) Oral three times a day PRN  dextrose 40% Gel 15 Gram(s) Oral once PRN  glucagon  Injectable 1 milliGRAM(s) IntraMuscular once PRN      REVIEW OF SYSTEMS  --------------------------------------------------------------------------------  Gen: No weight changes, fatigue, fevers/chills, weakness  Skin: No rashes  Head/Eyes/Ears/Mouth: No headache; Normal hearing; Normal vision w/o blurriness  Respiratory: No dyspnea, cough, wheezing, hemoptysis  CV: No chest pain, PND, orthopnea  GI: No abdominal pain, diarrhea, constipation, nausea, vomiting  : No increased frequency, dysuria, hematuria, nocturia  MSK: No joint pain/swelling; no back pain; no edema  Neuro: No dizziness/lightheadedness, weakness  Heme: No easy bruising or bleeding  Endo: No heat/cold intolerance  Psych: No significant nervousness, anxiety, stress, depression        VITALS/PHYSICAL EXAM  --------------------------------------------------------------------------------  T(C): 36.9 (04-17-20 @ 10:18), Max: 36.9 (04-17-20 @ 10:18)  HR: 65 (04-17-20 @ 04:36) (58 - 65)  BP: 145/94 (04-17-20 @ 10:18) (134/87 - 178/97)  RR: 18 (04-17-20 @ 10:18) (18 - 20)  SpO2: 95% (04-17-20 @ 10:18) (91% - 95%)  Wt(kg): --        04-16-20 @ 07:01  -  04-17-20 @ 07:00  --------------------------------------------------------  IN: 0 mL / OUT: 3000 mL / NET: -3000 mL      Physical Exam:  	Due to the nature of the pt's isolation status, no bedside physical exam done to limit spread of infection.  Objective data was reviewed in detail.      LABS/STUDIES  --------------------------------------------------------------------------------              14.0   9.04  >-----------<  310      [04-17-20 @ 07:14]              41.2     140  |  103  |  65.0  ----------------------------<  261      [04-17-20 @ 07:14]  5.0   |  24.0  |  2.18        Ca     8.4     [04-17-20 @ 07:14]      Mg     2.3     [04-17-20 @ 07:14]      Phos  3.0     [04-17-20 @ 07:14]                [04-17-20 @ 07:14]    Creatinine Trend:  SCr 2.18 [04-17 @ 07:14]  SCr 2.70 [04-16 @ 07:16]  SCr 3.14 [04-15 @ 10:08]  SCr 3.88 [04-14 @ 09:20]  SCr 4.19 [04-13 @ 10:42]        Ferritin 891      [04-17-20 @ 07:14]  HbA1c 5.8      [01-25-18 @ 20:31]    HCV 0.18, Nonreact      [04-13-20 @ 01:21]

## 2020-04-17 NOTE — PROGRESS NOTE ADULT - SUBJECTIVE AND OBJECTIVE BOX
INTERVAL HPI/OVERNIGHT EVENTS:    CC:   acute hypoxic respiratory failure sec COVID 19, hypertension, BPH, diabetes mellitus, SCAR on CKD.    Feels better, no new complaints.    Vital Signs Last 24 Hrs  T(C): 36.9 (17 Apr 2020 10:18), Max: 36.9 (17 Apr 2020 10:18)  T(F): 98.4 (17 Apr 2020 10:18), Max: 98.4 (17 Apr 2020 10:18)  HR: 65 (17 Apr 2020 04:36) (58 - 65)  BP: 145/94 (17 Apr 2020 10:18) (134/87 - 178/97)  BP(mean): --  RR: 18 (17 Apr 2020 10:18) (18 - 20)  SpO2: 95% (17 Apr 2020 10:18) (91% - 95%)    PHYSICAL EXAM:    GENERAL: alert, not in distress  CHEST/LUNG: b/l air entry  HEART: regular  ABDOMEN: soft, bs+  EXTREMITIES:  dressing to right LE, left no edema, tenderness    MEDICATIONS  (STANDING):  dextrose 5%. 1000 milliLiter(s) (50 mL/Hr) IV Continuous <Continuous>  dextrose 50% Injectable 12.5 Gram(s) IV Push once  dextrose 50% Injectable 25 Gram(s) IV Push once  dextrose 50% Injectable 25 Gram(s) IV Push once  enoxaparin Injectable 40 milliGRAM(s) SubCutaneous every 12 hours  folic acid 1 milliGRAM(s) Oral daily  insulin glargine Injectable (LANTUS) 5 Unit(s) SubCutaneous at bedtime  insulin lispro (HumaLOG) corrective regimen sliding scale   SubCutaneous three times a day before meals  insulin lispro Injectable (HumaLOG) 2 Unit(s) SubCutaneous three times a day before meals  metoprolol tartrate 12.5 milliGRAM(s) Oral two times a day  Nephro-oz 1 Tablet(s) Oral daily  sodium bicarbonate 650 milliGRAM(s) Oral three times a day  tamsulosin 0.4 milliGRAM(s) Oral at bedtime    MEDICATIONS  (PRN):  acetaminophen   Tablet .. 650 milliGRAM(s) Oral every 4 hours PRN Temp greater or equal to 38.5C (101.3F)  ALBUTerol    90 MICROgram(s) HFA Inhaler 2 Puff(s) Inhalation every 4 hours PRN Shortness of Breath and/or Wheezing  benzonatate 100 milliGRAM(s) Oral three times a day PRN Cough  dextrose 40% Gel 15 Gram(s) Oral once PRN Blood Glucose LESS THAN 70 milliGRAM(s)/deciliter  glucagon  Injectable 1 milliGRAM(s) IntraMuscular once PRN Glucose LESS THAN 70 milligrams/deciliter      Allergies    No Known Allergies    Intolerances          LABS:                          14.0   9.04  )-----------( 310      ( 17 Apr 2020 07:14 )             41.2     04-17    140  |  103  |  65.0<H>  ----------------------------<  261<H>  5.0   |  24.0  |  2.18<H>    Ca    8.4<L>      17 Apr 2020 07:14  Phos  3.0     04-17  Mg     2.3     04-17            RADIOLOGY & ADDITIONAL TESTS:

## 2020-04-17 NOTE — PROGRESS NOTE ADULT - ATTENDING COMMENTS
Will likely need rehab. Patient in agreement.  Discharge planning once renal function improves. Will likely need rehab. Patient in agreement.  Discharge planning once renal function improves.  Updated daughter Chasity.

## 2020-04-17 NOTE — PROGRESS NOTE ADULT - PROBLEM SELECTOR PLAN 3
Continue Metoprolol. Monitor BP.

## 2020-04-17 NOTE — PROGRESS NOTE ADULT - PROBLEM SELECTOR PLAN 4
Improving, likely secondary to obstruction, now has Dunn, continue Flomax. Start sodium bicarbonate tabs for metabolic acidosis.
Monitor BMP, labs pending.
On sodium bicarbonate gtt. Monitor renal function. Nephrology following.
Possible obstructive, monitor renal function. Nephrology following.
Resolving, likely sec obstruction sec to BPH and chronic diabetes and hypertension. Maintain Dunn for now. Monitor BMP. Nephrology following. On Sodium bicarbonate tabs.
Worsening renal failure, nephrology eval and renal US ordered. Check bladder scan.

## 2020-04-17 NOTE — PROGRESS NOTE ADULT - ASSESSMENT
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

## 2020-04-17 NOTE — PROGRESS NOTE ADULT - PROBLEM SELECTOR PLAN 2
Continue Flomax.
Continue Flomax. Check PVR bladder scan, to assess need for Dunn.
On Flomax, bladder scan, discussed with RN.
S/p Dunn catheter insertion, continue Flomax.
S/p Dunn catheter insertion, continue Flomax. Will need urology follow up on discharge for BPH. Maintain Dunn for now.
Continue Flomax.
Continue Flomax.

## 2020-04-17 NOTE — PROGRESS NOTE ADULT - PROBLEM SELECTOR PLAN 1
Hypoxia resolved, completed steroids and Plaquenil.
Hypoxia resolved, completed steroids and Plaquenil.
Sec COVID 19 pneumonia. Continue Plaquenil and steroids. Monitor pulse ox. Currently on nasal cannula. Explained possible need for intubation if he has increasing oxygen demands and/or respiratory distress. Currently is stable on nasal cannula.
Sec COVID 19 pneumonia. Continue Plaquenil and steroids. Stable on nasal cannula.
Sec COVID 19 pneumonia. Continue Plaquenil and steroids. Stable on nasal cannula.Completes steroids tomorrow.
Sec COVID 19 pneumonia. Continue Plaquenil and steroids. Stable on nasal cannula.
Sec COVID 19 pneumonia. Continue Plaquenil and steroids. Monitor pulse ox. Currently on nasal cannula. Explained possible need for intubation if he has increasing oxygen demands and/or respiratory distress. EKG and labs in am.

## 2020-04-17 NOTE — PROGRESS NOTE ADULT - ASSESSMENT
69 yr old male with hypertension, diabetes mellitus, CKD, BPH admitted with complaints of worsening shortness of breath, fever, body aches. He hypoxic in ED, requiring supplemental oxygen. COVID 19 testing was sent. Empirically started on steroids and Plaquenil on admission. He was also noted to have SCAR. Given worsening renal function, nephrology was consulted, bladder scan was ordered. Sodium bicarbonate infusion was ordered by nephrology for metabolic acidosis. He has a chronic right LE wound, wound care evaluation was requested. Advised local wound care. He was noted to have urinary retention on bladder scans and hence Dunn catheter was inserted. His renal function continued to improve. PT evaluation was requested given deconditioning.

## 2020-04-17 NOTE — PROGRESS NOTE ADULT - PROBLEM SELECTOR PLAN 5
Recent steroid use. Increase Lantus to 10 units, add pre meal insulin 3 units with sliding scale. Defer oral agents for now in setting of renal disease. A1C noted.

## 2020-04-18 LAB
ANION GAP SERPL CALC-SCNC: 15 MMOL/L — SIGNIFICANT CHANGE UP (ref 5–17)
BUN SERPL-MCNC: 57 MG/DL — HIGH (ref 8–20)
CALCIUM SERPL-MCNC: 8.5 MG/DL — LOW (ref 8.6–10.2)
CHLORIDE SERPL-SCNC: 103 MMOL/L — SIGNIFICANT CHANGE UP (ref 98–107)
CO2 SERPL-SCNC: 24 MMOL/L — SIGNIFICANT CHANGE UP (ref 22–29)
CREAT SERPL-MCNC: 1.92 MG/DL — HIGH (ref 0.5–1.3)
GLUCOSE BLDC GLUCOMTR-MCNC: 110 MG/DL — HIGH (ref 70–99)
GLUCOSE BLDC GLUCOMTR-MCNC: 217 MG/DL — HIGH (ref 70–99)
GLUCOSE BLDC GLUCOMTR-MCNC: 272 MG/DL — HIGH (ref 70–99)
GLUCOSE BLDC GLUCOMTR-MCNC: 346 MG/DL — HIGH (ref 70–99)
GLUCOSE SERPL-MCNC: 179 MG/DL — HIGH (ref 70–99)
HCT VFR BLD CALC: 40.2 % — SIGNIFICANT CHANGE UP (ref 39–50)
HGB BLD-MCNC: 13.3 G/DL — SIGNIFICANT CHANGE UP (ref 13–17)
MAGNESIUM SERPL-MCNC: 2.2 MG/DL — SIGNIFICANT CHANGE UP (ref 1.6–2.6)
MCHC RBC-ENTMCNC: 27.9 PG — SIGNIFICANT CHANGE UP (ref 27–34)
MCHC RBC-ENTMCNC: 33.1 GM/DL — SIGNIFICANT CHANGE UP (ref 32–36)
MCV RBC AUTO: 84.5 FL — SIGNIFICANT CHANGE UP (ref 80–100)
PLATELET # BLD AUTO: 287 K/UL — SIGNIFICANT CHANGE UP (ref 150–400)
POTASSIUM SERPL-MCNC: 4.9 MMOL/L — SIGNIFICANT CHANGE UP (ref 3.5–5.3)
POTASSIUM SERPL-SCNC: 4.9 MMOL/L — SIGNIFICANT CHANGE UP (ref 3.5–5.3)
RBC # BLD: 4.76 M/UL — SIGNIFICANT CHANGE UP (ref 4.2–5.8)
RBC # FLD: 12.5 % — SIGNIFICANT CHANGE UP (ref 10.3–14.5)
SODIUM SERPL-SCNC: 141 MMOL/L — SIGNIFICANT CHANGE UP (ref 135–145)
WBC # BLD: 8.43 K/UL — SIGNIFICANT CHANGE UP (ref 3.8–10.5)
WBC # FLD AUTO: 8.43 K/UL — SIGNIFICANT CHANGE UP (ref 3.8–10.5)

## 2020-04-18 PROCEDURE — 99233 SBSQ HOSP IP/OBS HIGH 50: CPT

## 2020-04-18 RX ADMIN — Medication 3 UNIT(S): at 17:53

## 2020-04-18 RX ADMIN — TAMSULOSIN HYDROCHLORIDE 0.4 MILLIGRAM(S): 0.4 CAPSULE ORAL at 21:07

## 2020-04-18 RX ADMIN — Medication 3 UNIT(S): at 09:12

## 2020-04-18 RX ADMIN — Medication 3 UNIT(S): at 12:28

## 2020-04-18 RX ADMIN — Medication 1 MILLIGRAM(S): at 12:24

## 2020-04-18 RX ADMIN — Medication 12.5 MILLIGRAM(S): at 04:34

## 2020-04-18 RX ADMIN — Medication 650 MILLIGRAM(S): at 12:24

## 2020-04-18 RX ADMIN — Medication 650 MILLIGRAM(S): at 04:34

## 2020-04-18 RX ADMIN — Medication 4: at 09:13

## 2020-04-18 RX ADMIN — ENOXAPARIN SODIUM 40 MILLIGRAM(S): 100 INJECTION SUBCUTANEOUS at 04:34

## 2020-04-18 RX ADMIN — Medication 1 TABLET(S): at 12:24

## 2020-04-18 RX ADMIN — INSULIN GLARGINE 10 UNIT(S): 100 INJECTION, SOLUTION SUBCUTANEOUS at 21:07

## 2020-04-18 RX ADMIN — Medication 12.5 MILLIGRAM(S): at 17:54

## 2020-04-18 RX ADMIN — ENOXAPARIN SODIUM 40 MILLIGRAM(S): 100 INJECTION SUBCUTANEOUS at 17:54

## 2020-04-18 RX ADMIN — Medication 8: at 12:29

## 2020-04-18 RX ADMIN — Medication 650 MILLIGRAM(S): at 21:07

## 2020-04-18 NOTE — PROGRESS NOTE ADULT - ASSESSMENT
69 yr old male with hypertension, diabetes mellitus, CKD, BPH admitted with complaints of worsening shortness of breath, fever, body aches. He hypoxic in ED, requiring supplemental oxygen. COVID 19 testing was sent. Empirically started on steroids and Plaquenil on admission. He was also noted to have SCAR. Given worsening renal function, nephrology was consulted, bladder scan was ordered. Sodium bicarbonate infusion was ordered by nephrology for metabolic acidosis. He has a chronic right LE wound, wound care evaluation was requested. Advised local wound care. He was noted to have urinary retention on bladder scans and hence Dunn catheter was inserted. His renal function continued to improve. PT evaluation was requested given deconditioning.     Problem/Plan - 1:  ·  Problem: Acute respiratory failure with hypoxia.  Plan: Hypoxia resolved, completed steroids and Plaquenil.      Problem/Plan - 2:  ·  Problem: BPH (benign prostatic hyperplasia).  Plan: S/p Dunn catheter insertion, continue Flomax. To follow urology outpatient on  discharge for BPH. Maintain Dunn for now.      Problem/Plan - 3:  ·  Problem: Hypertension.  Plan: Continue Metoprolol. Monitor BP.      Problem/Plan - 4:  ·  Problem: Acute kidney failure.  Plan: Resolving, likely sec obstruction sec to BPH and chronic diabetes and hypertension. Maintain Dunn for now. Monitor BMP. Nephrology following. On Sodium bicarbonate tabs.      Problem/Plan - 5:  ·  Problem: Diabetes mellitus.  Plan: Recent steroid use. Insulin sliding scale, insulin lantus    Attending Attestation:   Will likely need rehab. Patient in agreement.  Discharge planning once renal function improves.  Updated daughter Chasity. 69 yr old male with hypertension, diabetes mellitus, CKD, BPH admitted with complaints of worsening shortness of breath, fever, body aches. He hypoxic in ED, requiring supplemental oxygen. COVID 19 testing was sent. Empirically started on steroids and Plaquenil on admission. He was also noted to have SCAR. Given worsening renal function, nephrology was consulted, bladder scan was ordered. Sodium bicarbonate infusion was ordered by nephrology for metabolic acidosis. He has a chronic right LE wound, wound care evaluation was requested. Advised local wound care. He was noted to have urinary retention on bladder scans and hence Dunn catheter was inserted. His renal function continued to improve. PT evaluation was requested given deconditioning.     Problem/Plan - 1:  ·  Problem: Acute respiratory failure with hypoxia.  Plan: Hypoxia resolved, completed steroids and Plaquenil.      Problem/Plan - 2:  ·  Problem: BPH (benign prostatic hyperplasia).  Plan: S/p Dunn catheter insertion, continue Flomax. To follow urology outpatient on  discharge for BPH. Maintain Dunn for now.      Problem/Plan - 3:  ·  Problem: Hypertension.  Plan: Continue Metoprolol. Monitor BP.      Problem/Plan - 4:  ·  Problem: Acute kidney failure.  Plan: Resolving, likely sec obstruction sec to BPH and chronic diabetes and hypertension. Maintain Dunn for now. Monitor BMP. Nephrology following. On Sodium bicarbonate tabs.      Problem/Plan - 5:  ·  Problem: Diabetes mellitus.  Plan: Recent steroid use. Insulin sliding scale, insulin lantus

## 2020-04-18 NOTE — PROGRESS NOTE ADULT - SUBJECTIVE AND OBJECTIVE BOX
CC: Covid pneumonia.  Afebrile. Tolerating room air  HPI:  69M hx BPH, CKD, DM2 (diet controlled), HTN presents with worsening shortness of breath. Patient states started feeling unwell about 10 days ago. He was having fatigue, headache, fevers, body aches and shortness of breath. His wife has also been feeling unwell and was admitted here yesterday evening. He kids were concerned that he wasn't getting better either and shortness of breath was worsening so they convinced him to come in. Of note patient states that he hasn't been been taking any of his home medications for the last few weeks due to covid.     In ED, pt febrile to 101.1, P- 66, bp 125/82 and spo2 down to 90 on room air at rest. Improved to 97% on 2L. (11 Apr 2020 05:20)    REVIEW OF SYSTEMS:    Patient denied fever, chills, abdominal pain, nausea, vomiting, cough, shortness of breath, chest pain or palpitations    Vital Signs Last 24 Hrs  T(C): 37.2 (18 Apr 2020 07:15), Max: 37.3 (17 Apr 2020 19:48)  T(F): 98.9 (18 Apr 2020 07:15), Max: 99.2 (17 Apr 2020 19:48)  HR: 68 (18 Apr 2020 07:15) (66 - 74)  BP: 135/83 (18 Apr 2020 07:15) (121/76 - 144/75)  BP(mean): --  RR: 19 (18 Apr 2020 12:00) (18 - 20)  SpO2: 92% (18 Apr 2020 12:00) (91% - 95%)I&O's Summary    17 Apr 2020 07:01  -  18 Apr 2020 07:00  --------------------------------------------------------  IN: 0 mL / OUT: 2325 mL / NET: -2325 mL    18 Apr 2020 07:01  -  18 Apr 2020 15:06  --------------------------------------------------------  IN: 0 mL / OUT: 1900 mL / NET: -1900 mL      PHYSICAL EXAM:  GENERAL: NAD,   HEENT: PERRL, +EOMI, anicteric, no [place]  NECK: Supple, No JVD   CHEST/LUNG: CTA bilaterally; Normal effort  HEART: S1S2 Normal intensity, no murmurs, gallops or rubs noted  ABDOMEN: Soft, BS Normoactive, NT, ND, no HSM noted  EXTREMITIES:  2+ radial and DP pulses noted, no clubbing, cyanosis, or edema noted, Limited mobility   SKIN: No rashes or lesions noted  NEURO: A&Ox3, no focal deficits noted, CN II-XII intact  PSYCH: Depressed  mood and affect; insight/judgement appropriate  LABS:                        13.3   8.43  )-----------( 287      ( 18 Apr 2020 09:14 )             40.2     04-18    141  |  103  |  57.0<H>  ----------------------------<  179<H>  4.9   |  24.0  |  1.92<H>    Ca    8.5<L>      18 Apr 2020 09:14  Phos  3.0     04-17  Mg     2.2     04-18          RADIOLOGY & ADDITIONAL TESTS:    MEDICATIONS:  MEDICATIONS  (STANDING):  dextrose 5%. 1000 milliLiter(s) (50 mL/Hr) IV Continuous <Continuous>  dextrose 50% Injectable 12.5 Gram(s) IV Push once  dextrose 50% Injectable 25 Gram(s) IV Push once  dextrose 50% Injectable 25 Gram(s) IV Push once  enoxaparin Injectable 40 milliGRAM(s) SubCutaneous every 12 hours  folic acid 1 milliGRAM(s) Oral daily  insulin glargine Injectable (LANTUS) 10 Unit(s) SubCutaneous at bedtime  insulin lispro (HumaLOG) corrective regimen sliding scale   SubCutaneous three times a day before meals  insulin lispro Injectable (HumaLOG) 3 Unit(s) SubCutaneous three times a day before meals  metoprolol tartrate 12.5 milliGRAM(s) Oral two times a day  Nephro-oz 1 Tablet(s) Oral daily  sodium bicarbonate 650 milliGRAM(s) Oral three times a day  tamsulosin 0.4 milliGRAM(s) Oral at bedtime    MEDICATIONS  (PRN):  acetaminophen   Tablet .. 650 milliGRAM(s) Oral every 4 hours PRN Temp greater or equal to 38.5C (101.3F)  ALBUTerol    90 MICROgram(s) HFA Inhaler 2 Puff(s) Inhalation every 4 hours PRN Shortness of Breath and/or Wheezing  benzonatate 100 milliGRAM(s) Oral three times a day PRN Cough  dextrose 40% Gel 15 Gram(s) Oral once PRN Blood Glucose LESS THAN 70 milliGRAM(s)/deciliter  glucagon  Injectable 1 milliGRAM(s) IntraMuscular once PRN Glucose LESS THAN 70 milligrams/deciliter

## 2020-04-19 LAB
ANION GAP SERPL CALC-SCNC: 12 MMOL/L — SIGNIFICANT CHANGE UP (ref 5–17)
BUN SERPL-MCNC: 48 MG/DL — HIGH (ref 8–20)
CALCIUM SERPL-MCNC: 8.2 MG/DL — LOW (ref 8.6–10.2)
CHLORIDE SERPL-SCNC: 102 MMOL/L — SIGNIFICANT CHANGE UP (ref 98–107)
CO2 SERPL-SCNC: 28 MMOL/L — SIGNIFICANT CHANGE UP (ref 22–29)
CREAT SERPL-MCNC: 1.86 MG/DL — HIGH (ref 0.5–1.3)
GLUCOSE BLDC GLUCOMTR-MCNC: 146 MG/DL — HIGH (ref 70–99)
GLUCOSE BLDC GLUCOMTR-MCNC: 149 MG/DL — HIGH (ref 70–99)
GLUCOSE BLDC GLUCOMTR-MCNC: 157 MG/DL — HIGH (ref 70–99)
GLUCOSE BLDC GLUCOMTR-MCNC: 190 MG/DL — HIGH (ref 70–99)
GLUCOSE SERPL-MCNC: 201 MG/DL — HIGH (ref 70–99)
MAGNESIUM SERPL-MCNC: 2 MG/DL — SIGNIFICANT CHANGE UP (ref 1.8–2.6)
POTASSIUM SERPL-MCNC: 5.2 MMOL/L — SIGNIFICANT CHANGE UP (ref 3.5–5.3)
POTASSIUM SERPL-SCNC: 5.2 MMOL/L — SIGNIFICANT CHANGE UP (ref 3.5–5.3)
SODIUM SERPL-SCNC: 141 MMOL/L — SIGNIFICANT CHANGE UP (ref 135–145)

## 2020-04-19 PROCEDURE — 99233 SBSQ HOSP IP/OBS HIGH 50: CPT

## 2020-04-19 RX ADMIN — Medication 650 MILLIGRAM(S): at 04:38

## 2020-04-19 RX ADMIN — Medication 3 UNIT(S): at 08:25

## 2020-04-19 RX ADMIN — Medication 1 TABLET(S): at 12:54

## 2020-04-19 RX ADMIN — TAMSULOSIN HYDROCHLORIDE 0.4 MILLIGRAM(S): 0.4 CAPSULE ORAL at 20:58

## 2020-04-19 RX ADMIN — INSULIN GLARGINE 10 UNIT(S): 100 INJECTION, SOLUTION SUBCUTANEOUS at 20:58

## 2020-04-19 RX ADMIN — ENOXAPARIN SODIUM 40 MILLIGRAM(S): 100 INJECTION SUBCUTANEOUS at 17:37

## 2020-04-19 RX ADMIN — ENOXAPARIN SODIUM 40 MILLIGRAM(S): 100 INJECTION SUBCUTANEOUS at 04:39

## 2020-04-19 RX ADMIN — Medication 12.5 MILLIGRAM(S): at 04:38

## 2020-04-19 RX ADMIN — Medication 2: at 08:25

## 2020-04-19 RX ADMIN — Medication 12.5 MILLIGRAM(S): at 17:39

## 2020-04-19 RX ADMIN — Medication 650 MILLIGRAM(S): at 20:58

## 2020-04-19 RX ADMIN — Medication 1 MILLIGRAM(S): at 12:54

## 2020-04-19 RX ADMIN — Medication 3 UNIT(S): at 12:55

## 2020-04-19 RX ADMIN — Medication 650 MILLIGRAM(S): at 12:54

## 2020-04-19 RX ADMIN — Medication 3 UNIT(S): at 17:36

## 2020-04-19 NOTE — PROGRESS NOTE ADULT - ASSESSMENT
69 yr old male with hypertension, diabetes mellitus, CKD, BPH admitted with complaints of worsening shortness of breath, fever, body aches. He hypoxic in ED, requiring supplemental oxygen. COVID 19 testing was sent. Empirically started on steroids and Plaquenil on admission. He was also noted to have SCAR. Given worsening renal function, nephrology was consulted, bladder scan was ordered. Sodium bicarbonate infusion was ordered by nephrology for metabolic acidosis. He has a chronic right LE wound, wound care evaluation was requested. Advised local wound care. He was noted to have urinary retention on bladder scans and hence Dunn catheter was inserted. His renal function continued to improve. PT recommend [facility] given deconditioning.     Problem/Plan - 1:  ·  Problem: Acute respiratory failure with hypoxia.  Plan: Hypoxia resolved, completed steroids and Plaquenil. Now on room air     Problem/Plan - 2:  ·  Problem: BPH (benign prostatic hyperplasia).  Plan: S/p Dunn catheter insertion, continue Flomax. To follow urology outpatient on  discharge for BPH. Maintain Dunn for now.      Problem/Plan - 3:  ·  Problem: Hypertension.  Plan: Continue Metoprolol. Monitor BP.      Problem/Plan - 4:  ·  Problem: Acute kidney failure.  Plan: Resolving, likely sec obstruction sec to BPH and chronic diabetes and hypertension. Maintain Dunn for now. Monitor BMP. Nephrology following. On Sodium bicarbonate tabs.      Problem/Plan - 5:  ·  Problem: Diabetes mellitus.  Plan: Recent steroid use. Insulin sliding scale, insulin lantus    Dispo: [facility] when arrangements made 69 yr old male with hypertension, diabetes mellitus, CKD, BPH admitted with complaints of worsening shortness of breath, fever, body aches. He hypoxic in ED, requiring supplemental oxygen. COVID 19 testing was sent. Empirically started on steroids and Plaquenil on admission. He was also noted to have SCAR. Given worsening renal function, nephrology was consulted, bladder scan was ordered. Sodium bicarbonate infusion was ordered by nephrology for metabolic acidosis. He has a chronic right LE wound, wound care evaluation was requested. Advised local wound care. He was noted to have urinary retention on bladder scans and hence Dunn catheter was inserted. His renal function continued to improve. PT recommend [facility] given deconditioning.     Problem/Plan - 1:  ·  Problem: Acute respiratory failure with hypoxia.  Plan: Hypoxia resolved, completed steroids and Plaquenil. Now on room air     Problem/Plan - 2:  ·  Problem: BPH (benign prostatic hyperplasia).  Plan: S/p Dunn catheter insertion, continue Flomax. To follow urology outpatient on  discharge for BPH. Maintain Dunn for now.      Problem/Plan - 3:  ·  Problem: Hypertension.  Plan: Continue Metoprolol. Monitor BP.      Problem/Plan - 4:  ·  Problem: Acute kidney failure.  Plan: Resolving, likely sec obstruction sec to BPH and chronic diabetes and hypertension. Maintain Dunn for now. Monitor BMP. Nephrology following. On Sodium bicarbonate tabs.      Problem/Plan - 5:  ·  Problem: Diabetes mellitus.  Plan: Recent steroid use. Insulin sliding scale, insulin lantus    Dispo: [facility] when arrangements made    Called wife Cheryle Contreras at [phone] to update. NO answere.

## 2020-04-19 NOTE — PROGRESS NOTE ADULT - SUBJECTIVE AND OBJECTIVE BOX
CC: acute hypoxic respiratory failure sec COVID 19, hypertension, BPH, diabetes mellitus, SCAR    INTERVAL HPI/OVERNIGHT EVENTS: Patient seen and examined. Feeling improved. Denies chest pain, SOB, nausea, vomiting, fever, chills.     Vital Signs Last 24 Hrs  T(C): 36.7 (19 Apr 2020 08:34), Max: 37 (18 Apr 2020 15:55)  T(F): 98 (19 Apr 2020 08:34), Max: 98.6 (18 Apr 2020 15:55)  HR: 65 (19 Apr 2020 08:34) (65 - 73)  BP: 126/78 (19 Apr 2020 08:34) (122/74 - 157/88)  BP(mean): --  RR: 20 (19 Apr 2020 08:34) (19 - 20)  SpO2: 98% (19 Apr 2020 08:34) (91% - 98%)    PHYSICAL EXAM:    GENERAL: alert, not in distress  CHEST/LUNG: b/l air entry, no wheeze  HEART: regular  ABDOMEN: soft, bs+   EXTREMITIES: no edema, chronic right LE wound   PSYCH: Calm and cooperative      I&O's Detail    18 Apr 2020 07:01  -  19 Apr 2020 07:00  --------------------------------------------------------  IN:    Oral Fluid: 125 mL  Total IN: 125 mL    OUT:    Indwelling Catheter - Urethral: 665 mL    Voided: 1900 mL  Total OUT: 2565 mL    Total NET: -2440 mL                                    13.3   8.43  )-----------( 287      ( 18 Apr 2020 09:14 )             40.2     19 Apr 2020 11:24    141    |  102    |  48.0   ----------------------------<  201    5.2     |  28.0   |  1.86     Ca    8.2        19 Apr 2020 11:24  Mg     2.0       19 Apr 2020 11:24        CAPILLARY BLOOD GLUCOSE      POCT Blood Glucose.: 146 mg/dL (19 Apr 2020 11:45)  POCT Blood Glucose.: 157 mg/dL (19 Apr 2020 08:21)  POCT Blood Glucose.: 272 mg/dL (18 Apr 2020 21:06)  POCT Blood Glucose.: 110 mg/dL (18 Apr 2020 17:17)  POCT Blood Glucose.: 346 mg/dL (18 Apr 2020 12:27)          MEDICATIONS  (STANDING):  dextrose 5%. 1000 milliLiter(s) (50 mL/Hr) IV Continuous <Continuous>  dextrose 50% Injectable 12.5 Gram(s) IV Push once  dextrose 50% Injectable 25 Gram(s) IV Push once  dextrose 50% Injectable 25 Gram(s) IV Push once  enoxaparin Injectable 40 milliGRAM(s) SubCutaneous every 12 hours  folic acid 1 milliGRAM(s) Oral daily  insulin glargine Injectable (LANTUS) 10 Unit(s) SubCutaneous at bedtime  insulin lispro (HumaLOG) corrective regimen sliding scale   SubCutaneous three times a day before meals  insulin lispro Injectable (HumaLOG) 3 Unit(s) SubCutaneous three times a day before meals  metoprolol tartrate 12.5 milliGRAM(s) Oral two times a day  Nephro-oz 1 Tablet(s) Oral daily  sodium bicarbonate 650 milliGRAM(s) Oral three times a day  tamsulosin 0.4 milliGRAM(s) Oral at bedtime    MEDICATIONS  (PRN):  acetaminophen   Tablet .. 650 milliGRAM(s) Oral every 4 hours PRN Temp greater or equal to 38.5C (101.3F)  ALBUTerol    90 MICROgram(s) HFA Inhaler 2 Puff(s) Inhalation every 4 hours PRN Shortness of Breath and/or Wheezing  benzonatate 100 milliGRAM(s) Oral three times a day PRN Cough  dextrose 40% Gel 15 Gram(s) Oral once PRN Blood Glucose LESS THAN 70 milliGRAM(s)/deciliter  glucagon  Injectable 1 milliGRAM(s) IntraMuscular once PRN Glucose LESS THAN 70 milligrams/deciliter      RADIOLOGY & ADDITIONAL TESTS:

## 2020-04-19 NOTE — PROGRESS NOTE ADULT - ATTENDING COMMENTS
Covid pneumonia .  Resp failure, fever resolved  Renal insufficiency.  Renal function is improving   Weakness, deconditioning.  Awaiting rehab transfer.

## 2020-04-20 ENCOUNTER — TRANSCRIPTION ENCOUNTER (OUTPATIENT)
Age: 70
End: 2020-04-20

## 2020-04-20 VITALS
HEART RATE: 73 BPM | RESPIRATION RATE: 18 BRPM | SYSTOLIC BLOOD PRESSURE: 129 MMHG | OXYGEN SATURATION: 95 % | TEMPERATURE: 98 F | DIASTOLIC BLOOD PRESSURE: 84 MMHG

## 2020-04-20 DIAGNOSIS — N17.9 ACUTE KIDNEY FAILURE, UNSPECIFIED: ICD-10-CM

## 2020-04-20 DIAGNOSIS — R33.9 RETENTION OF URINE, UNSPECIFIED: ICD-10-CM

## 2020-04-20 LAB
GLUCOSE BLDC GLUCOMTR-MCNC: 145 MG/DL — HIGH (ref 70–99)
GLUCOSE BLDC GLUCOMTR-MCNC: 151 MG/DL — HIGH (ref 70–99)
GLUCOSE BLDC GLUCOMTR-MCNC: 257 MG/DL — HIGH (ref 70–99)

## 2020-04-20 PROCEDURE — 83605 ASSAY OF LACTIC ACID: CPT

## 2020-04-20 PROCEDURE — 71045 X-RAY EXAM CHEST 1 VIEW: CPT

## 2020-04-20 PROCEDURE — 36415 COLL VENOUS BLD VENIPUNCTURE: CPT

## 2020-04-20 PROCEDURE — 85730 THROMBOPLASTIN TIME PARTIAL: CPT

## 2020-04-20 PROCEDURE — 84100 ASSAY OF PHOSPHORUS: CPT

## 2020-04-20 PROCEDURE — 87635 SARS-COV-2 COVID-19 AMP PRB: CPT

## 2020-04-20 PROCEDURE — 84295 ASSAY OF SERUM SODIUM: CPT

## 2020-04-20 PROCEDURE — 82435 ASSAY OF BLOOD CHLORIDE: CPT

## 2020-04-20 PROCEDURE — 82728 ASSAY OF FERRITIN: CPT

## 2020-04-20 PROCEDURE — 82947 ASSAY GLUCOSE BLOOD QUANT: CPT

## 2020-04-20 PROCEDURE — 97116 GAIT TRAINING THERAPY: CPT

## 2020-04-20 PROCEDURE — 83036 HEMOGLOBIN GLYCOSYLATED A1C: CPT

## 2020-04-20 PROCEDURE — 83615 LACTATE (LD) (LDH) ENZYME: CPT

## 2020-04-20 PROCEDURE — 85027 COMPLETE CBC AUTOMATED: CPT

## 2020-04-20 PROCEDURE — 86803 HEPATITIS C AB TEST: CPT

## 2020-04-20 PROCEDURE — 85610 PROTHROMBIN TIME: CPT

## 2020-04-20 PROCEDURE — 82962 GLUCOSE BLOOD TEST: CPT

## 2020-04-20 PROCEDURE — 80053 COMPREHEN METABOLIC PANEL: CPT

## 2020-04-20 PROCEDURE — 93005 ELECTROCARDIOGRAM TRACING: CPT

## 2020-04-20 PROCEDURE — 99232 SBSQ HOSP IP/OBS MODERATE 35: CPT

## 2020-04-20 PROCEDURE — 97163 PT EVAL HIGH COMPLEX 45 MIN: CPT

## 2020-04-20 PROCEDURE — 80048 BASIC METABOLIC PNL TOTAL CA: CPT

## 2020-04-20 PROCEDURE — 85014 HEMATOCRIT: CPT

## 2020-04-20 PROCEDURE — 83735 ASSAY OF MAGNESIUM: CPT

## 2020-04-20 PROCEDURE — 84145 PROCALCITONIN (PCT): CPT

## 2020-04-20 PROCEDURE — 82803 BLOOD GASES ANY COMBINATION: CPT

## 2020-04-20 PROCEDURE — 76775 US EXAM ABDO BACK WALL LIM: CPT

## 2020-04-20 PROCEDURE — 99285 EMERGENCY DEPT VISIT HI MDM: CPT

## 2020-04-20 PROCEDURE — 82330 ASSAY OF CALCIUM: CPT

## 2020-04-20 PROCEDURE — 86140 C-REACTIVE PROTEIN: CPT

## 2020-04-20 PROCEDURE — 76770 US EXAM ABDO BACK WALL COMP: CPT

## 2020-04-20 PROCEDURE — 84132 ASSAY OF SERUM POTASSIUM: CPT

## 2020-04-20 PROCEDURE — 85379 FIBRIN DEGRADATION QUANT: CPT

## 2020-04-20 PROCEDURE — 97110 THERAPEUTIC EXERCISES: CPT

## 2020-04-20 RX ORDER — INSULIN LISPRO 100/ML
3 VIAL (ML) SUBCUTANEOUS
Qty: 0 | Refills: 0 | DISCHARGE
Start: 2020-04-20

## 2020-04-20 RX ORDER — METOPROLOL TARTRATE 50 MG
12.5 TABLET ORAL
Qty: 0 | Refills: 0 | DISCHARGE
Start: 2020-04-20

## 2020-04-20 RX ORDER — SODIUM BICARBONATE 1 MEQ/ML
1 SYRINGE (ML) INTRAVENOUS
Qty: 0 | Refills: 0 | DISCHARGE
Start: 2020-04-20

## 2020-04-20 RX ORDER — ACETAMINOPHEN 500 MG
2 TABLET ORAL
Qty: 0 | Refills: 0 | DISCHARGE
Start: 2020-04-20

## 2020-04-20 RX ORDER — ENOXAPARIN SODIUM 100 MG/ML
40 INJECTION SUBCUTANEOUS
Qty: 0 | Refills: 0 | DISCHARGE
Start: 2020-04-20

## 2020-04-20 RX ORDER — LOSARTAN POTASSIUM 100 MG/1
1 TABLET, FILM COATED ORAL
Qty: 0 | Refills: 0 | DISCHARGE

## 2020-04-20 RX ORDER — INSULIN LISPRO 100/ML
0 VIAL (ML) SUBCUTANEOUS
Qty: 0 | Refills: 0 | DISCHARGE
Start: 2020-04-20

## 2020-04-20 RX ORDER — ALBUTEROL 90 UG/1
2 AEROSOL, METERED ORAL
Qty: 0 | Refills: 0 | DISCHARGE
Start: 2020-04-20

## 2020-04-20 RX ORDER — INSULIN GLARGINE 100 [IU]/ML
10 INJECTION, SOLUTION SUBCUTANEOUS
Qty: 0 | Refills: 0 | DISCHARGE
Start: 2020-04-20

## 2020-04-20 RX ADMIN — ENOXAPARIN SODIUM 40 MILLIGRAM(S): 100 INJECTION SUBCUTANEOUS at 16:29

## 2020-04-20 RX ADMIN — Medication 650 MILLIGRAM(S): at 04:06

## 2020-04-20 RX ADMIN — Medication 12.5 MILLIGRAM(S): at 04:06

## 2020-04-20 RX ADMIN — Medication 6: at 12:51

## 2020-04-20 RX ADMIN — Medication 12.5 MILLIGRAM(S): at 16:28

## 2020-04-20 RX ADMIN — Medication 3 UNIT(S): at 12:51

## 2020-04-20 RX ADMIN — Medication 650 MILLIGRAM(S): at 12:52

## 2020-04-20 RX ADMIN — Medication 1 TABLET(S): at 11:25

## 2020-04-20 RX ADMIN — Medication 2: at 09:08

## 2020-04-20 RX ADMIN — ENOXAPARIN SODIUM 40 MILLIGRAM(S): 100 INJECTION SUBCUTANEOUS at 04:06

## 2020-04-20 RX ADMIN — Medication 1 MILLIGRAM(S): at 11:25

## 2020-04-20 RX ADMIN — Medication 3 UNIT(S): at 09:08

## 2020-04-20 RX ADMIN — Medication 3 UNIT(S): at 16:27

## 2020-04-20 NOTE — DISCHARGE NOTE PROVIDER - CARE PROVIDER_API CALL
Mehdi Meade)  Medicine  Nephrology  [address] [place] [address]  Phone: [phone]  Fax: [phone]  Follow Up Time:     Haim Pacheco)  Urology  [address] [facility], [address]  [place] [address]  Phone: [phone]  Fax: [phone]  Follow Up Time:

## 2020-04-20 NOTE — PROGRESS NOTE ADULT - ASSESSMENT
69 yr old male with hypertension, diabetes mellitus, CKD, BPH admitted with complaints of worsening shortness of breath, fever, body aches. He hypoxic in ED, requiring supplemental oxygen. COVID 19 testing was sent. Empirically started on steroids and Plaquenil on admission. He was also noted to have SCAR. Given worsening renal function, nephrology was consulted, bladder scan was ordered. Sodium bicarbonate infusion was ordered by nephrology for metabolic acidosis. He has a chronic right LE wound, wound care evaluation was requested. Advised local wound care. He was noted to have urinary retention on bladder scans and hence Dunn catheter was inserted. His renal function continued to improve. PT recommend [facility] given deconditioning.     Problem/Plan - 1:  ·  Problem: Acute respiratory failure with hypoxia.  Plan: Hypoxia resolved, completed steroids and Plaquenil. Now on room air     Problem/Plan - 2:  ·  Problem: BPH (benign prostatic hyperplasia).  Plan: S/p Dunn catheter insertion, continue Flomax. To follow urology outpatient on  discharge for BPH. Maintain Dunn for now.      Problem/Plan - 3:  ·  Problem: Hypertension.  Plan: Continue Metoprolol. Monitor BP.      Problem/Plan - 4:  ·  Problem: Acute kidney failure.  Plan: Resolving, likely sec obstruction sec to BPH and chronic diabetes and hypertension. Maintain Dunn for now. Monitor BMP. Nephrology following. On Sodium bicarbonate tabs.      Problem/Plan - 5:  ·  Problem: Diabetes mellitus.  Plan: Recent steroid use. Insulin sliding scale, insulin lantus    Dispo: [facility] when arrangements made

## 2020-04-20 NOTE — DISCHARGE NOTE PROVIDER - NSDCMRMEDTOKEN_GEN_ALL_CORE_FT
Flomax 0.4 mg oral capsule: 1 cap(s) orally once a day (at bedtime)  folic acid 1 mg oral tablet: 1 tab(s) orally once a day  losartan 25 mg oral tablet: 1 tab(s) orally once a day (at bedtime)  metoprolol succinate 50 mg oral tablet, extended release: 1 tab(s) orally once a day  Vitamin D3 2000 intl units oral tablet: 1 tab(s) orally once a day acetaminophen 325 mg oral tablet: 2 tab(s) orally every 4 hours, As needed, Temp greater or equal to 38.5C (101.3F)  albuterol 90 mcg/inh inhalation aerosol: 2 puff(s) inhaled every 4 hours, As needed, Shortness of Breath and/or Wheezing  benzonatate 100 mg oral capsule: 1 cap(s) orally 3 times a day, As needed, Cough  enoxaparin: 40 milligram(s) subcutaneous every 12 hours  Flomax 0.4 mg oral capsule: 1 cap(s) orally once a day (at bedtime)  folic acid 1 mg oral tablet: 1 tab(s) orally once a day  insulin glargine: 10 unit(s) subcutaneous once a day (at bedtime)  insulin lispro: 3 unit(s) subcutaneous 3 times a day (with meals)  insulin lispro: Aacuchecks AC/HS with ISS coverage  metoprolol: 12.5 milligram(s) orally every 12 hours  multivitamin: 1 tab(s) orally once a day  sodium bicarbonate 650 mg oral tablet: 1 tab(s) orally 3 times a day  Vitamin D3 2000 intl units oral tablet: 1 tab(s) orally once a day

## 2020-04-20 NOTE — DISCHARGE NOTE PROVIDER - CARE PROVIDERS DIRECT ADDRESSES
,DirectAddress_Unknown,[email or web address],DirectAddress_Unknown ,DirectAddress_Unknown,[email or web address]

## 2020-04-20 NOTE — DISCHARGE NOTE NURSING/CASE MANAGEMENT/SOCIAL WORK - NSDCPELOVENOX_GEN_ALL_CORE
Addended by: NIKKI HAMPTON on: 1/30/2018 11:20 AM     Modules accepted: Orders    
Enoxaparin/Lovenox - Follow up monitoring/Enoxaparin/Lovenox - Dietary Advice/Enoxaparin/Lovenox - Compliance/Enoxaparin/Lovenox - Potential for adverse drug reactions and interactions

## 2020-04-20 NOTE — PROGRESS NOTE ADULT - SUBJECTIVE AND OBJECTIVE BOX
CC: acute hypoxic respiratory failure sec COVID 19, hypertension, BPH, diabetes mellitus, SCAR    INTERVAL HPI/OVERNIGHT EVENTS: Patient seen and examined. No acute events overnight. Asking for additional food, appetite improved. Denies chest pain, SOB, dizziness, nausea, vomiting, fever, chills.     Vital Signs Last 24 Hrs  T(C): 36.9 (20 Apr 2020 07:42), Max: 37.8 (19 Apr 2020 16:52)  T(F): 98.5 (20 Apr 2020 07:42), Max: 100 (19 Apr 2020 16:52)  HR: 67 (20 Apr 2020 07:42) (67 - 74)  BP: 137/80 (20 Apr 2020 07:42) (125/71 - 137/80)  BP(mean): --  RR: 18 (20 Apr 2020 07:42) (18 - 20)  SpO2: 94% (20 Apr 2020 07:42) (90% - 96%)    PHYSICAL EXAM:    GENERAL: alert, not in distress  CHEST/LUNG: b/l air entry, no wheeze  HEART: regular  ABDOMEN: soft, bs+   EXTREMITIES: no edema, chronic right LE wound   PSYCH: Calm and cooperative    I&O's Detail    19 Apr 2020 07:01  -  20 Apr 2020 07:00  --------------------------------------------------------  IN:    Oral Fluid: 350 mL  Total IN: 350 mL    OUT:    Indwelling Catheter - Urethral: 1400 mL  Total OUT: 1400 mL    Total NET: -1050 mL                19 Apr 2020 11:24    141    |  102    |  48.0   ----------------------------<  201    5.2     |  28.0   |  1.86     Ca    8.2        19 Apr 2020 11:24  Mg     2.0       19 Apr 2020 11:24        CAPILLARY BLOOD GLUCOSE      POCT Blood Glucose.: 257 mg/dL (20 Apr 2020 12:07)  POCT Blood Glucose.: 151 mg/dL (20 Apr 2020 08:45)  POCT Blood Glucose.: 190 mg/dL (19 Apr 2020 20:38)  POCT Blood Glucose.: 149 mg/dL (19 Apr 2020 17:06)          MEDICATIONS  (STANDING):  dextrose 5%. 1000 milliLiter(s) (50 mL/Hr) IV Continuous <Continuous>  dextrose 50% Injectable 12.5 Gram(s) IV Push once  dextrose 50% Injectable 25 Gram(s) IV Push once  dextrose 50% Injectable 25 Gram(s) IV Push once  enoxaparin Injectable 40 milliGRAM(s) SubCutaneous every 12 hours  folic acid 1 milliGRAM(s) Oral daily  insulin glargine Injectable (LANTUS) 10 Unit(s) SubCutaneous at bedtime  insulin lispro (HumaLOG) corrective regimen sliding scale   SubCutaneous three times a day before meals  insulin lispro Injectable (HumaLOG) 3 Unit(s) SubCutaneous three times a day before meals  metoprolol tartrate 12.5 milliGRAM(s) Oral two times a day  Nephro-oz 1 Tablet(s) Oral daily  sodium bicarbonate 650 milliGRAM(s) Oral three times a day  tamsulosin 0.4 milliGRAM(s) Oral at bedtime    MEDICATIONS  (PRN):  acetaminophen   Tablet .. 650 milliGRAM(s) Oral every 4 hours PRN Temp greater or equal to 38.5C (101.3F)  ALBUTerol    90 MICROgram(s) HFA Inhaler 2 Puff(s) Inhalation every 4 hours PRN Shortness of Breath and/or Wheezing  benzonatate 100 milliGRAM(s) Oral three times a day PRN Cough  dextrose 40% Gel 15 Gram(s) Oral once PRN Blood Glucose LESS THAN 70 milliGRAM(s)/deciliter  glucagon  Injectable 1 milliGRAM(s) IntraMuscular once PRN Glucose LESS THAN 70 milligrams/deciliter      RADIOLOGY & ADDITIONAL TESTS:

## 2020-04-20 NOTE — DISCHARGE NOTE PROVIDER - NSDCCPCAREPLAN_GEN_ALL_CORE_FT
PRINCIPAL DISCHARGE DIAGNOSIS  Diagnosis: Acute respiratory failure with hypoxia  Assessment and Plan of Treatment: Due to COVID  Hypoxia resolved, completed steroids and Plaquenil. Now on room air      SECONDARY DISCHARGE DIAGNOSES  Diagnosis: Suspected 2019 novel coronavirus infection  Assessment and Plan of Treatment: as above

## 2020-04-20 NOTE — DISCHARGE NOTE PROVIDER - HOSPITAL COURSE
69 yr old male with hypertension, diabetes mellitus, CKD, BPH admitted with complaints of worsening shortness of breath, fever, body aches. Hypoxic in ED, requiring supplemental oxygen. COVID 19 +. Received IV steroids and Plaquenil on admission. He was also noted to have SCAR. Given worsening renal function, nephrology was consulted, bladder scan revealed retention, tabor placed.  Sodium bicarbonate infusion was ordered by nephrology for metabolic acidosis. He has a chronic right LE wound, wound care evaluation was requested. Advised local wound care. His renal function continued to improve. PT recommend YANICK given deconditioning.  Now on room air. 69 yr old male with hypertension, diabetes mellitus, CKD, BPH admitted with complaints of worsening shortness of breath, fever, body aches. Hypoxic in ED, requiring supplemental oxygen. COVID 19 +. Received IV steroids and Plaquenil on admission. He was also noted to have SCAR. Given worsening renal function, nephrology was consulted, bladder scan revealed retention, tabor placed.  Sodium bicarbonate infusion was ordered by nephrology for metabolic acidosis. He has a chronic right LE wound, wound care evaluation was requested. Advised local wound care. His renal function continued to improve. PT recommend YANICK given deconditioning.  Now on room air.         Discharge 38 min

## 2020-04-20 NOTE — DISCHARGE NOTE NURSING/CASE MANAGEMENT/SOCIAL WORK - PATIENT PORTAL LINK FT
You can access the FollowMyHealth Patient Portal offered by [facility] by registering at the following website: [email or web address]. By joining [facility] FollowMyHealth portal, you will also be able to view your health information using other applications (apps) compatible with our system.

## 2021-02-25 ENCOUNTER — APPOINTMENT (OUTPATIENT)
Dept: VASCULAR SURGERY | Facility: CLINIC | Age: 71
End: 2021-02-25

## 2022-03-01 NOTE — ASU DISCHARGE PLAN (ADULT/PEDIATRIC). - MEDICATION SUMMARY - MEDICATIONS TO TAKE
I will START or STAY ON the medications listed below when I get home from the hospital:    Aspirin Enteric Coated 81 mg oral delayed release tablet  -- 1 tab(s) by mouth once a day  -- Indication: For Home med    Percocet 5/325 oral tablet  -- 1-2 tab(s) by mouth every 4-6 hours as needed for pain. MDD:6  -- Caution federal law prohibits the transfer of this drug to any person other  than the person for whom it was prescribed.  May cause drowsiness.  Alcohol may intensify this effect.  Use care when operating dangerous machinery.  This prescription cannot be refilled.  This product contains acetaminophen.  Do not use  with any other product containing acetaminophen to prevent possible liver damage.  Using more of this medication than prescribed may cause serious breathing problems.    -- Indication: For Pain med    losartan 25 mg oral tablet  -- 1 tab(s) by mouth once a day (at bedtime)  -- Indication: For Home med    Flomax 0.4 mg oral capsule  -- 1 cap(s) by mouth once a day (at bedtime)  -- Indication: For Home med    metoprolol succinate 50 mg oral tablet, extended release  -- 1 tab(s) by mouth once a day  -- Indication: For Home med    Keflex 500 mg oral capsule  -- 1 cap(s) by mouth 4 times a day (after meals and at bedtime) x 7 days  -- Finish all this medication unless otherwise directed by prescriber.    -- Indication: For Antibiotic    folic acid 1 mg oral tablet  -- 1 tab(s) by mouth once a day  -- Indication: For Home supplement    Vitamin D3 2000 intl units oral tablet  -- 1 tab(s) by mouth once a day  -- Indication: For Home supplement
28-Feb-2022 09:26

## 2024-01-22 NOTE — H&P PST ADULT - WEIGHT IN LBS
Continuing and/or managing psychotropic medications 06-Jul-2020 12:15 Continuing and/or managing psychotropic medications 233.9

## 2024-06-25 NOTE — PRE-OP CHECKLIST - PATIENT'S PERSONAL PROPERTY GIVEN TO
GOAL: Stair Negotiation Training: Patient will be able to negotiate up & down 1 flight of stairs with unilateral rail, step to gait pattern, in 4 weeks./balance training/bed mobility training/gait training/strengthening/transfer training on unit

## 2025-01-06 NOTE — ED ADULT NURSE NOTE - BREATH SOUNDS, MLM
Problem: At Risk for Falls  Goal: Patient does not fall  Outcome: Monitoring/Evaluating progress  Goal: Patient takes action to control fall-related risks  Outcome: Monitoring/Evaluating progress     Problem: At Risk for Injury Due to Fall  Goal: Patient does not fall  Outcome: Monitoring/Evaluating progress  Goal: Takes action to control condition specific risks  Outcome: Monitoring/Evaluating progress  Goal: Verbalizes understanding of fall-related injury personal risks  Description: Document education using the patient education activity  Outcome: Monitoring/Evaluating progress     Problem: Impaired Physical Mobility  Goal: Functional status is maintained or returned to baseline during hospitalization  Outcome: Monitoring/Evaluating progress  Goal: Tolerates activity for discharge setting with no abnormal symptoms  Outcome: Monitoring/Evaluating progress     Problem: Skin Integrity Alteration  Goal: Skin remains intact with no new/deterioration of wound or pressure injury  Outcome: Monitoring/Evaluating progress  Goal: Participates in wound care activities  Outcome: Monitoring/Evaluating progress      Clear